# Patient Record
Sex: FEMALE | Race: WHITE | Employment: FULL TIME | URBAN - METROPOLITAN AREA
[De-identification: names, ages, dates, MRNs, and addresses within clinical notes are randomized per-mention and may not be internally consistent; named-entity substitution may affect disease eponyms.]

---

## 2017-03-20 PROBLEM — J01.00 ACUTE NON-RECURRENT MAXILLARY SINUSITIS: Status: ACTIVE | Noted: 2017-03-20

## 2017-03-23 PROBLEM — Z98.891 PREVIOUS CESAREAN SECTION: Status: ACTIVE | Noted: 2017-03-23

## 2017-06-09 PROBLEM — E03.9 ACQUIRED HYPOTHYROIDISM: Status: ACTIVE | Noted: 2017-06-09

## 2017-06-09 PROBLEM — Z00.01 ENCOUNTER FOR GENERAL ADULT MEDICAL EXAMINATION WITH ABNORMAL FINDINGS: Status: ACTIVE | Noted: 2017-06-09

## 2017-06-09 PROBLEM — E55.9 VITAMIN D DEFICIENCY: Status: ACTIVE | Noted: 2017-06-09

## 2017-06-09 PROBLEM — H92.01 RIGHT EAR PAIN: Status: ACTIVE | Noted: 2017-06-09

## 2017-06-09 PROBLEM — R53.83 FATIGUE: Status: ACTIVE | Noted: 2017-06-09

## 2017-07-21 PROBLEM — G58.8 INTERCOSTAL NEURALGIA: Status: ACTIVE | Noted: 2017-07-21

## 2017-12-08 PROBLEM — R10.13 EPIGASTRIC PAIN: Status: ACTIVE | Noted: 2017-12-08

## 2017-12-08 PROBLEM — K21.9 GASTROESOPHAGEAL REFLUX DISEASE WITHOUT ESOPHAGITIS: Status: ACTIVE | Noted: 2017-12-08

## 2018-03-14 ENCOUNTER — HOSPITAL ENCOUNTER (OUTPATIENT)
Dept: GENERAL RADIOLOGY | Age: 28
Discharge: HOME | End: 2018-03-16
Payer: COMMERCIAL

## 2018-03-14 DIAGNOSIS — N63.10 LUMP OF BREAST, RIGHT: ICD-10-CM

## 2018-03-14 DIAGNOSIS — D24.2 FIBROADENOMA OF LEFT BREAST: ICD-10-CM

## 2018-03-14 PROCEDURE — 76642 ULTRASOUND BREAST LIMITED: CPT

## 2018-03-15 ENCOUNTER — HOSPITAL ENCOUNTER (OUTPATIENT)
Dept: ULTRASOUND IMAGING | Age: 28
Discharge: HOME | End: 2018-03-17
Payer: COMMERCIAL

## 2018-03-15 DIAGNOSIS — R10.2 PELVIC PAIN IN FEMALE: ICD-10-CM

## 2018-03-15 PROCEDURE — 76856 US EXAM PELVIC COMPLETE: CPT

## 2018-03-15 RX ORDER — LEVOTHYROXINE SODIUM 0.1 MG/1
100 TABLET ORAL DAILY
Qty: 90 TABLET | Refills: 1 | Status: SHIPPED | OUTPATIENT
Start: 2018-03-15 | End: 2018-09-12 | Stop reason: SDUPTHER

## 2018-05-07 ENCOUNTER — OFFICE VISIT (OUTPATIENT)
Dept: FAMILY MEDICINE CLINIC | Age: 28
End: 2018-05-07
Payer: COMMERCIAL

## 2018-05-07 ENCOUNTER — HOSPITAL ENCOUNTER (OUTPATIENT)
Age: 28
Discharge: HOME | End: 2018-05-09
Payer: COMMERCIAL

## 2018-05-07 VITALS
RESPIRATION RATE: 20 BRPM | OXYGEN SATURATION: 96 % | BODY MASS INDEX: 29.41 KG/M2 | DIASTOLIC BLOOD PRESSURE: 64 MMHG | HEIGHT: 63 IN | WEIGHT: 166 LBS | HEART RATE: 87 BPM | SYSTOLIC BLOOD PRESSURE: 116 MMHG

## 2018-05-07 DIAGNOSIS — K21.9 GASTROESOPHAGEAL REFLUX DISEASE WITHOUT ESOPHAGITIS: ICD-10-CM

## 2018-05-07 DIAGNOSIS — Z00.01 ENCOUNTER FOR GENERAL ADULT MEDICAL EXAMINATION WITH ABNORMAL FINDINGS: ICD-10-CM

## 2018-05-07 DIAGNOSIS — R10.11 RUQ PAIN: ICD-10-CM

## 2018-05-07 DIAGNOSIS — Z00.01 ENCOUNTER FOR GENERAL ADULT MEDICAL EXAMINATION WITH ABNORMAL FINDINGS: Primary | ICD-10-CM

## 2018-05-07 LAB
ALBUMIN SERPL-MCNC: 4.5 G/DL (ref 3.5–5.2)
ALP BLD-CCNC: 66 U/L (ref 35–104)
ALT SERPL-CCNC: 20 U/L (ref 0–32)
ANION GAP SERPL CALCULATED.3IONS-SCNC: 14 MMOL/L (ref 7–16)
AST SERPL-CCNC: 22 U/L (ref 0–31)
BILIRUB SERPL-MCNC: 0.3 MG/DL (ref 0–1.2)
BUN BLDV-MCNC: 11 MG/DL (ref 6–20)
CALCIUM SERPL-MCNC: 8.9 MG/DL (ref 8.6–10.2)
CHLORIDE BLD-SCNC: 102 MMOL/L (ref 98–107)
CHOLESTEROL, TOTAL: 189 MG/DL (ref 0–199)
CO2: 24 MMOL/L (ref 22–29)
CREAT SERPL-MCNC: 0.5 MG/DL (ref 0.5–1)
GFR AFRICAN AMERICAN: >60
GFR NON-AFRICAN AMERICAN: >60 ML/MIN/1.73
GLUCOSE BLD-MCNC: 79 MG/DL (ref 74–109)
HCT VFR BLD CALC: 41.3 % (ref 34–48)
HDLC SERPL-MCNC: 48 MG/DL
HEMOGLOBIN: 12.9 G/DL (ref 11.5–15.5)
LDL CHOLESTEROL CALCULATED: 125 MG/DL (ref 0–99)
MCH RBC QN AUTO: 27.9 PG (ref 26–35)
MCHC RBC AUTO-ENTMCNC: 31.2 % (ref 32–34.5)
MCV RBC AUTO: 89.4 FL (ref 80–99.9)
PDW BLD-RTO: 12.4 FL (ref 11.5–15)
PLATELET # BLD: 221 E9/L (ref 130–450)
PMV BLD AUTO: 11.7 FL (ref 7–12)
POTASSIUM SERPL-SCNC: 4 MMOL/L (ref 3.5–5)
RBC # BLD: 4.62 E12/L (ref 3.5–5.5)
SODIUM BLD-SCNC: 140 MMOL/L (ref 132–146)
TOTAL PROTEIN: 7.7 G/DL (ref 6.4–8.3)
TRIGL SERPL-MCNC: 82 MG/DL (ref 0–149)
TSH SERPL DL<=0.05 MIU/L-ACNC: 0.52 UIU/ML (ref 0.27–4.2)
VLDLC SERPL CALC-MCNC: 16 MG/DL
WBC # BLD: 6.6 E9/L (ref 4.5–11.5)

## 2018-05-07 PROCEDURE — 99395 PREV VISIT EST AGE 18-39: CPT | Performed by: FAMILY MEDICINE

## 2018-05-07 PROCEDURE — 36415 COLL VENOUS BLD VENIPUNCTURE: CPT | Performed by: FAMILY MEDICINE

## 2018-05-07 PROCEDURE — 84443 ASSAY THYROID STIM HORMONE: CPT

## 2018-05-07 PROCEDURE — 85027 COMPLETE CBC AUTOMATED: CPT

## 2018-05-07 PROCEDURE — 80061 LIPID PANEL: CPT

## 2018-05-07 PROCEDURE — 80053 COMPREHEN METABOLIC PANEL: CPT

## 2018-05-07 ASSESSMENT — ENCOUNTER SYMPTOMS
COLOR CHANGE: 0
APNEA: 0
CONSTIPATION: 0
WHEEZING: 0
CHEST TIGHTNESS: 0
BLOOD IN STOOL: 0
SORE THROAT: 0
COUGH: 0
RHINORRHEA: 0
NAUSEA: 1
SINUS PRESSURE: 0
BACK PAIN: 0
EYE ITCHING: 0
SHORTNESS OF BREATH: 0
EYE PAIN: 0
BELCHING: 1
DIARRHEA: 0
VOMITING: 0
EYE REDNESS: 0
ABDOMINAL PAIN: 1

## 2018-05-07 ASSESSMENT — PATIENT HEALTH QUESTIONNAIRE - PHQ9
1. LITTLE INTEREST OR PLEASURE IN DOING THINGS: 0
SUM OF ALL RESPONSES TO PHQ QUESTIONS 1-9: 0
SUM OF ALL RESPONSES TO PHQ9 QUESTIONS 1 & 2: 0
2. FEELING DOWN, DEPRESSED OR HOPELESS: 0

## 2018-05-25 ENCOUNTER — HOSPITAL ENCOUNTER (OUTPATIENT)
Dept: ULTRASOUND IMAGING | Age: 28
Discharge: HOME | End: 2018-05-27
Payer: COMMERCIAL

## 2018-05-25 DIAGNOSIS — R10.11 RUQ PAIN: ICD-10-CM

## 2018-05-25 PROCEDURE — 76705 ECHO EXAM OF ABDOMEN: CPT

## 2018-07-30 ENCOUNTER — HOSPITAL ENCOUNTER (OUTPATIENT)
Dept: GENERAL RADIOLOGY | Age: 28
Discharge: HOME | End: 2018-08-01
Payer: COMMERCIAL

## 2018-07-30 DIAGNOSIS — D24.2 BREAST FIBROADENOMA, LEFT: ICD-10-CM

## 2018-09-10 ENCOUNTER — TELEPHONE (OUTPATIENT)
Dept: BREAST CENTER | Age: 28
End: 2018-09-10

## 2018-09-12 RX ORDER — LEVOTHYROXINE SODIUM 100 MCG
TABLET ORAL
Qty: 90 TABLET | Refills: 3 | Status: SHIPPED | OUTPATIENT
Start: 2018-09-12 | End: 2018-12-17 | Stop reason: SDUPTHER

## 2018-09-17 ENCOUNTER — TELEPHONE (OUTPATIENT)
Dept: ADMINISTRATIVE | Age: 28
End: 2018-09-17

## 2018-09-17 ENCOUNTER — OFFICE VISIT (OUTPATIENT)
Dept: FAMILY MEDICINE CLINIC | Age: 28
End: 2018-09-17
Payer: COMMERCIAL

## 2018-09-17 VITALS
BODY MASS INDEX: 29.59 KG/M2 | OXYGEN SATURATION: 98 % | SYSTOLIC BLOOD PRESSURE: 100 MMHG | HEART RATE: 79 BPM | RESPIRATION RATE: 18 BRPM | HEIGHT: 63 IN | WEIGHT: 167 LBS | TEMPERATURE: 98.2 F | DIASTOLIC BLOOD PRESSURE: 70 MMHG

## 2018-09-17 DIAGNOSIS — J06.9 VIRAL URI: ICD-10-CM

## 2018-09-17 DIAGNOSIS — R42 VERTIGO: ICD-10-CM

## 2018-09-17 PROCEDURE — 99213 OFFICE O/P EST LOW 20 MIN: CPT | Performed by: FAMILY MEDICINE

## 2018-09-17 RX ORDER — MECLIZINE HCL 12.5 MG/1
12.5 TABLET ORAL 3 TIMES DAILY PRN
Qty: 30 TABLET | Refills: 0 | Status: SHIPPED | OUTPATIENT
Start: 2018-09-17 | End: 2018-09-27

## 2018-09-17 ASSESSMENT — PATIENT HEALTH QUESTIONNAIRE - PHQ9
2. FEELING DOWN, DEPRESSED OR HOPELESS: 0
1. LITTLE INTEREST OR PLEASURE IN DOING THINGS: 0
SUM OF ALL RESPONSES TO PHQ9 QUESTIONS 1 & 2: 0
SUM OF ALL RESPONSES TO PHQ QUESTIONS 1-9: 0
SUM OF ALL RESPONSES TO PHQ QUESTIONS 1-9: 0

## 2018-09-17 ASSESSMENT — ENCOUNTER SYMPTOMS
SHORTNESS OF BREATH: 0
SORE THROAT: 0
BLOOD IN STOOL: 0
CHEST TIGHTNESS: 0
COLOR CHANGE: 0
COUGH: 0
SWOLLEN GLANDS: 0
VOMITING: 0
SINUS PRESSURE: 0
EYE REDNESS: 0
WHEEZING: 0
NAUSEA: 0
EYE PAIN: 0
RHINORRHEA: 0
APNEA: 0
DIARRHEA: 0
CONSTIPATION: 0
ABDOMINAL PAIN: 0
BACK PAIN: 0
EYE ITCHING: 0

## 2018-09-17 NOTE — TELEPHONE ENCOUNTER
Scheduled pt for appt fri 9/21 for ringing in ears, then she stated that she is dizzy, and transferred call to the office for further assistance

## 2018-09-17 NOTE — PROGRESS NOTES
MOUTH ONE TIME A DAY 90 tablet 3    fluticasone (FLONASE) 50 MCG/ACT nasal spray 2 sprays by Nasal route daily 1 Bottle 1    albuterol sulfate HFA (PROAIR HFA) 108 (90 BASE) MCG/ACT inhaler Inhale 2 puffs into the lungs every 6 hours as needed for Wheezing 1 Inhaler 3     No current facility-administered medications for this visit. Allergies   Allergen Reactions    Cefdinir Hives     Had reaction as a child    Lomedia 24 Fe [Norethin Ace-Eth Estrad-Fe] Hives       Social History     Social History    Marital status:      Spouse name: N/A    Number of children: N/A    Years of education: N/A     Occupational History     [employer]     Social History Main Topics    Smoking status: Never Smoker    Smokeless tobacco: Never Used    Alcohol use No      Comment: wine once in a while    Drug use: No    Sexual activity: Yes     Other Topics Concern    None     Social History Narrative    None       Family History   Problem Relation Age of Onset    Hypertension Mother         pulmonary hypertension     High Blood Pressure Mother     Other Mother     Heart Disease Father     Kidney Disease Father     Hypertension Father         IBS & polyps    Other Sister         gastric ulcer & IBS          Review of Systems   Constitutional: Negative for activity change, appetite change, diaphoresis, fatigue and fever. HENT: Positive for congestion. Negative for ear pain, hearing loss, nosebleeds, rhinorrhea, sinus pressure and sore throat. Eyes: Negative for pain, redness, itching and visual disturbance. Respiratory: Negative for apnea, cough, chest tightness, shortness of breath and wheezing. Cardiovascular: Negative for chest pain, palpitations and leg swelling. Gastrointestinal: Negative for abdominal pain, blood in stool, constipation, diarrhea, nausea and vomiting. Endocrine: Negative.     Genitourinary: Negative for decreased urine volume, difficulty urinating, dysuria, frequency, hematuria and urgency. Musculoskeletal: Negative for arthralgias, back pain, gait problem, myalgias and neck pain. Skin: Negative for color change and rash. Allergic/Immunologic: Negative for environmental allergies and food allergies. Neurological: Positive for dizziness and vertigo. Negative for weakness, light-headedness, numbness and headaches. Hematological: Negative for adenopathy. Does not bruise/bleed easily. Psychiatric/Behavioral: Negative for behavioral problems, dysphoric mood and sleep disturbance. The patient is not nervous/anxious and is not hyperactive. All other systems reviewed and are negative. /70   Pulse 79   Temp 98.2 °F (36.8 °C)   Resp 18   Ht 5' 3\" (1.6 m)   Wt 167 lb (75.8 kg)   SpO2 98%   BMI 29.58 kg/m²     Physical Exam   Constitutional: She is oriented to person, place, and time. She appears well-developed and well-nourished. HENT:   Head: Normocephalic and atraumatic. Right Ear: External ear normal.   Left Ear: External ear normal.   Nose: Nose normal.   Mouth/Throat: Oropharynx is clear and moist.   Eyes: Pupils are equal, round, and reactive to light. Conjunctivae and EOM are normal. No scleral icterus. Neck: Normal range of motion. Neck supple. No thyromegaly present. Cardiovascular: Normal rate, regular rhythm and normal heart sounds. No murmur heard. Pulmonary/Chest: Effort normal and breath sounds normal. No respiratory distress. She has no wheezes. She has no rales. Abdominal: Soft. Bowel sounds are normal. She exhibits no distension. There is no tenderness. Musculoskeletal: Normal range of motion. She exhibits no edema or tenderness. Lymphadenopathy:     She has no cervical adenopathy. Neurological: She is alert and oriented to person, place, and time. She has normal reflexes. No cranial nerve deficit. Skin: Skin is warm and dry. No rash noted. No erythema.    Psychiatric: She has a normal mood and

## 2018-09-20 ENCOUNTER — HOSPITAL ENCOUNTER (EMERGENCY)
Dept: HOSPITAL 83 - ED | Age: 28
Discharge: HOME | End: 2018-09-20
Payer: COMMERCIAL

## 2018-09-20 VITALS — BODY MASS INDEX: 29.59 KG/M2 | HEIGHT: 62.99 IN | WEIGHT: 167 LBS

## 2018-09-20 DIAGNOSIS — Z79.899: ICD-10-CM

## 2018-09-20 DIAGNOSIS — F41.9: ICD-10-CM

## 2018-09-20 DIAGNOSIS — R42: Primary | ICD-10-CM

## 2018-09-20 DIAGNOSIS — Z88.8: ICD-10-CM

## 2018-09-20 LAB
AMPHETAMINES UR QL SCN: < 1000
BARBITURATES UR QL SCN: < 200
BENZODIAZ UR QL SCN: < 200
BZE UR QL SCN: < 300
CANNABINOIDS UR QL SCN: < 50
METHADONE UR QL SCN: < 300
OPIATES UR QL SCN: < 300
PCP UR QL SCN: <  25

## 2018-09-21 DIAGNOSIS — N63.20 MASSES OF BOTH BREASTS: Primary | ICD-10-CM

## 2018-09-21 DIAGNOSIS — N63.10 MASSES OF BOTH BREASTS: Primary | ICD-10-CM

## 2018-09-21 ASSESSMENT — ENCOUNTER SYMPTOMS
EYE ITCHING: 0
RHINORRHEA: 0
CHEST TIGHTNESS: 0
SINUS PRESSURE: 0
CHOKING: 0
WHEEZING: 0
VOICE CHANGE: 0
DIARRHEA: 0
TROUBLE SWALLOWING: 0
COUGH: 0
VOMITING: 0
BLOOD IN STOOL: 0
BACK PAIN: 0
SHORTNESS OF BREATH: 0
SORE THROAT: 0
SINUS PAIN: 0
ABDOMINAL DISTENTION: 0
EYE DISCHARGE: 0
CONSTIPATION: 0
ABDOMINAL PAIN: 0
NAUSEA: 0

## 2018-09-21 NOTE — PROGRESS NOTES
performed in the left breast using a radial and anti-radial approach. There is an oval solid mass with partially defined margins measuring 13 x 6 x 8 mm seen in the left breast at 8 o'clock located 3 centimeters from the nipple. Internal    echogenicity is hypoechoic.       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       It contains a clip which correlates with history of prior biopsy.       No sonographic evidence of suspicious solid or cystic mass lesions.  No focal areas of suspicious atypical echogenicity.       IMPRESSION:   Solid mass in the left breast is benign.       Screening mammogram at age 38 is recommended.       =======================================   BI-RADS Category 2:  Benign   =======================================      Right Ultrasound       HISTORY:   The patient has no personal history of cancer. The patient has the following family history of breast cancer:  paternal aunt, at age 48. 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       ULTRASOUND TECHNIQUE:   High-resolution real-time ultrasound scanning was performed. Additional elastography and doppler color flow analysis of any finding was also obtained.       COMPARISON:   No prior imaging studies are available for comparison.       ULTRASOUND FINDINGS:           Finding 1:   Sonography was performed in the right breast using a radial and anti-radial approach. No sonographic evidence of suspicious solid or cystic mass lesions. No focal areas of suspicious atypical echogenicity.       IMPRESSION:   There is no sonographic evidence of malignancy.       Screening mammogram at age 38 is recommended.       =======================================   BI-RADS Category 1:  Negative     Review of Systems   Constitutional: Negative for activity change, appetite change, chills, fatigue, fever and unexpected weight change.    HENT: Negative for congestion, postnasal drip, rhinorrhea, sinus pain, sinus pressure, sore throat, trouble swallowing and voice change. Eyes: Negative for discharge, itching and visual disturbance. Respiratory: Negative for cough, choking, chest tightness, shortness of breath and wheezing. Cardiovascular: Negative for chest pain, palpitations and leg swelling. Gastrointestinal: Negative for abdominal distention, abdominal pain, blood in stool, constipation, diarrhea, nausea and vomiting. Endocrine: Negative for cold intolerance and heat intolerance. Genitourinary: Negative for difficulty urinating, dysuria, frequency and hematuria. Musculoskeletal: Negative for arthralgias, back pain, gait problem, joint swelling, myalgias, neck pain and neck stiffness. Allergic/Immunologic: Negative for environmental allergies and food allergies. Neurological: Negative for dizziness, seizures, syncope, speech difficulty, weakness, light-headedness and headaches. Hematological: Negative for adenopathy. Does not bruise/bleed easily. Psychiatric/Behavioral: Negative for agitation, confusion and decreased concentration. The patient is not nervous/anxious. Objective:   Physical Exam   Constitutional: She is oriented to person, place, and time. She appears well-developed and well-nourished. No distress. HENT:   Head: Normocephalic and atraumatic. Mouth/Throat: Oropharynx is clear and moist. No oropharyngeal exudate. Eyes: Conjunctivae and EOM are normal. Right eye exhibits no discharge. Left eye exhibits no discharge. No scleral icterus. Neck: Normal range of motion. Neck supple. No JVD present. No tracheal deviation present. No thyromegaly present. Cardiovascular: Normal rate and regular rhythm. Exam reveals no gallop and no friction rub. No murmur heard. Pulmonary/Chest: Effort normal and breath sounds normal. No stridor. No respiratory distress. She has no wheezes. She has no rales.  She exhibits no mass, no tenderness, no bony tenderness, no laceration, no

## 2018-09-24 ENCOUNTER — HOSPITAL ENCOUNTER (OUTPATIENT)
Dept: GENERAL RADIOLOGY | Age: 28
Discharge: HOME | End: 2018-09-26
Payer: COMMERCIAL

## 2018-09-24 ENCOUNTER — OFFICE VISIT (OUTPATIENT)
Dept: BREAST CENTER | Age: 28
End: 2018-09-24
Payer: COMMERCIAL

## 2018-09-24 VITALS
SYSTOLIC BLOOD PRESSURE: 102 MMHG | OXYGEN SATURATION: 98 % | RESPIRATION RATE: 16 BRPM | HEART RATE: 76 BPM | BODY MASS INDEX: 29.59 KG/M2 | DIASTOLIC BLOOD PRESSURE: 68 MMHG | TEMPERATURE: 97.9 F | HEIGHT: 63 IN | WEIGHT: 167 LBS

## 2018-09-24 DIAGNOSIS — N63.10 MASSES OF BOTH BREASTS: ICD-10-CM

## 2018-09-24 DIAGNOSIS — N63.20 LEFT BREAST MASS: ICD-10-CM

## 2018-09-24 DIAGNOSIS — N63.20 MASSES OF BOTH BREASTS: ICD-10-CM

## 2018-09-24 PROCEDURE — 99204 OFFICE O/P NEW MOD 45 MIN: CPT | Performed by: NURSE PRACTITIONER

## 2018-09-24 PROCEDURE — 99203 OFFICE O/P NEW LOW 30 MIN: CPT | Performed by: NURSE PRACTITIONER

## 2018-09-24 PROCEDURE — 76642 ULTRASOUND BREAST LIMITED: CPT

## 2018-09-24 ASSESSMENT — ENCOUNTER SYMPTOMS
TROUBLE SWALLOWING: 0
WHEEZING: 0
COUGH: 0
EYE DISCHARGE: 0
VOICE CHANGE: 0
VOMITING: 0
ABDOMINAL PAIN: 0
SINUS PRESSURE: 0
CONSTIPATION: 0
SHORTNESS OF BREATH: 0
EYE ITCHING: 0
SINUS PAIN: 0
ABDOMINAL DISTENTION: 0
BACK PAIN: 0
SORE THROAT: 0
BLOOD IN STOOL: 0
CHOKING: 0
RHINORRHEA: 0
DIARRHEA: 0
NAUSEA: 0
CHEST TIGHTNESS: 0

## 2018-09-24 NOTE — PROGRESS NOTES
x 8 mm seen in the left breast at 8 o'clock located 3 centimeters from the nipple. Internal    echogenicity is hypoechoic.       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       It contains a clip which correlates with history of prior biopsy.       No sonographic evidence of suspicious solid or cystic mass lesions.  No focal areas of suspicious atypical echogenicity.       IMPRESSION:   Solid mass in the left breast is benign.       Screening mammogram at age 38 is recommended.       =======================================   BI-RADS Category 2:  Benign   =======================================      Right Ultrasound       HISTORY:   The patient has no personal history of cancer. The patient has the following family history of breast cancer:  paternal aunt, at age 48. 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       ULTRASOUND TECHNIQUE:   High-resolution real-time ultrasound scanning was performed. Additional elastography and doppler color flow analysis of any finding was also obtained.       COMPARISON:   No prior imaging studies are available for comparison.       ULTRASOUND FINDINGS:           Finding 1:   Sonography was performed in the right breast using a radial and anti-radial approach. No sonographic evidence of suspicious solid or cystic mass lesions.  No focal areas of suspicious atypical echogenicity.       IMPRESSION:   There is no sonographic evidence of malignancy.       Screening mammogram at age 38 is recommended.       =======================================   BI-RADS Category 1:  Negative         Past Medical History:   Diagnosis Date    Abdominal cramping     Asthma     excersise induced and allergy induced    Chest pain 6/6/14    Reason for referral    Diarrhea     interchanging with constipation    Dizziness 6/6/14    during pregnancy    Environmental allergies     Mitral valve prolapse     no regurgitation    Palpitations 6/6/14 caffeine induced    Thyroid disease      Past Surgical History:   Procedure Laterality Date     SECTION      2 children     COLONOSCOPY  16   [facility]       Social History     Social History    Marital status:      Spouse name: N/A    Number of children: N/A    Years of education: N/A     Occupational History     [employer]     Social History Main Topics    Smoking status: Never Smoker    Smokeless tobacco: Never Used    Alcohol use No      Comment: wine once in a while    Drug use: No    Sexual activity: Yes     Other Topics Concern    Not on file     Social History Narrative    No narrative on file     Allergies   Allergen Reactions    Cefdinir Hives     Had reaction as a child    Lomedia 24 Fe [Norethin Ace-Eth Estrad-Fe] Hives     Current Outpatient Prescriptions on File Prior to Visit   Medication Sig Dispense Refill    meclizine (ANTIVERT) 12.5 MG tablet Take 1 tablet by mouth 3 times daily as needed for Dizziness 30 tablet 0    SYNTHROID 100 MCG tablet TAKE ONE TABLET BY MOUTH ONE TIME A DAY 90 tablet 3    fluticasone (FLONASE) 50 MCG/ACT nasal spray 2 sprays by Nasal route daily 1 Bottle 1    albuterol sulfate HFA (PROAIR HFA) 108 (90 BASE) MCG/ACT inhaler Inhale 2 puffs into the lungs every 6 hours as needed for Wheezing 1 Inhaler 3     No current facility-administered medications on file prior to visit. Review of Systems   Constitutional: Negative for activity change, appetite change, chills, fatigue, fever and unexpected weight change. HENT: Negative for congestion, postnasal drip, rhinorrhea, sinus pain, sinus pressure, sore throat, trouble swallowing and voice change. Eyes: Negative for discharge, itching and visual disturbance. Respiratory: Negative for cough, choking, chest tightness, shortness of breath and wheezing. Cardiovascular: Negative for chest pain, palpitations and leg swelling.    Gastrointestinal: appropriate density bilaterally. No skin dimpling or puckering. No nipple discharge. No lumps nodules or masses appreciated on right. No axillary lymphadenopathy. Abdominal: Soft. She exhibits no distension. There is no tenderness. There is no rebound and no guarding. Musculoskeletal: Normal range of motion. She exhibits no edema, tenderness or deformity. Right shoulder: Normal.        Left shoulder: Normal.   Lymphadenopathy:     She has no cervical adenopathy. Right cervical: No superficial cervical, no deep cervical and no posterior cervical adenopathy present. Left cervical: No superficial cervical, no deep cervical and no posterior cervical adenopathy present. Right axillary: No pectoral and no lateral adenopathy present. Left axillary: No pectoral and no lateral adenopathy present. Neurological: She is alert and oriented to person, place, and time. Coordination normal.   Skin: Skin is warm and dry. No rash noted. She is not diaphoretic. No erythema. No pallor. Psychiatric: She has a normal mood and affect. Her behavior is normal. Judgment and thought content normal.   Nursing note and vitals reviewed. Assessment:      Popeye Liriano is an extremely pleasant 34 y.o. female with FH of breast cancer in paternal aunt in her early 52's (currently late 52's). She has a history of US core biopsy proven fibroadenoma done at [facility] in 2011. She presents today with C/O of increasing discomfort within the left breast.         Bilateral breast US 03/14/2018 @ [address] [place] [address] Dr García Decker Ultrasound       HISTORY:   The patient has no personal history of cancer. The patient has the following family history of breast cancer:  paternal aunt, at age 48. 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       ULTRASOUND TECHNIQUE:   High-resolution real-time ultrasound scanning was performed.  Additional elastography and doppler color flow analysis of any finding was also obtained.       COMPARISON:   No prior imaging studies are available for comparison.       ULTRASOUND FINDINGS:           Finding 1:   Sonography was performed in the left breast using a radial and anti-radial approach. There is an oval solid mass with partially defined margins measuring 13 x 6 x 8 mm seen in the left breast at 8 o'clock located 3 centimeters from the nipple. Internal    echogenicity is hypoechoic.       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       It contains a clip which correlates with history of prior biopsy.       No sonographic evidence of suspicious solid or cystic mass lesions.  No focal areas of suspicious atypical echogenicity.       IMPRESSION:   Solid mass in the left breast is benign.       Screening mammogram at age 38 is recommended.       =======================================   BI-RADS Category 2:  Benign   =======================================      Right Ultrasound       HISTORY:   The patient has no personal history of cancer. The patient has the following family history of breast cancer:  paternal aunt, at age 48. 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       ULTRASOUND TECHNIQUE:   High-resolution real-time ultrasound scanning was performed. Additional elastography and doppler color flow analysis of any finding was also obtained.       COMPARISON:   No prior imaging studies are available for comparison.       ULTRASOUND FINDINGS:           Finding 1:   Sonography was performed in the right breast using a radial and anti-radial approach. No sonographic evidence of suspicious solid or cystic mass lesions.  No focal areas of suspicious atypical echogenicity.       IMPRESSION:   There is no sonographic evidence of malignancy.       Screening mammogram at age 38 is recommended.       =======================================   BI-RADS Category 1:  Negative      A Bilateral breast ultrasound tolerated. 3. Avoid alcohol or limit alcohol intake to < 3 drinks per week. 4. Limit caffeine intake. 5. Wear a good supportive bra at all times when awake. 6. Mammogram beginning at age 38.  9. Continue follow up with Primary Care and GYN. 8. Obtain pathology report from [facility].  9. RTC if symptoms worsen or if new symptoms develop. During today's visit, face-to-face time 45 minutes, greater than 50% in counseling education and coordination of care. All questions were answered to her apparent satisfaction, and she is agreeable to the plan as outlined above. Vickey Velazquez, RN, MSN, ACNP-BC, AOCNP  Advanced Oncology Certified Nurse Practitioner  Department of Breast Surgery  [facility] [place]/  [facility] in collaboration with Dr. Don Abbott.  Hanna Sawant, APRN - CNP

## 2018-09-25 ENCOUNTER — TELEPHONE (OUTPATIENT)
Dept: BREAST CENTER | Age: 28
End: 2018-09-25

## 2018-09-26 PROBLEM — Z00.01 ENCOUNTER FOR GENERAL ADULT MEDICAL EXAMINATION WITH ABNORMAL FINDINGS: Status: RESOLVED | Noted: 2017-06-09 | Resolved: 2018-09-26

## 2018-10-16 ENCOUNTER — OFFICE VISIT (OUTPATIENT)
Dept: BREAST CENTER | Age: 28
End: 2018-10-16
Payer: COMMERCIAL

## 2018-10-16 VITALS
WEIGHT: 168 LBS | HEIGHT: 63 IN | RESPIRATION RATE: 16 BRPM | DIASTOLIC BLOOD PRESSURE: 72 MMHG | TEMPERATURE: 98.4 F | BODY MASS INDEX: 29.77 KG/M2 | SYSTOLIC BLOOD PRESSURE: 116 MMHG | OXYGEN SATURATION: 99 % | HEART RATE: 80 BPM

## 2018-10-16 DIAGNOSIS — N63.0 BREAST MASS: ICD-10-CM

## 2018-10-16 PROBLEM — R10.11 RUQ PAIN: Status: RESOLVED | Noted: 2018-05-07 | Resolved: 2018-10-16

## 2018-10-16 PROBLEM — R10.13 EPIGASTRIC PAIN: Status: RESOLVED | Noted: 2017-12-08 | Resolved: 2018-10-16

## 2018-10-16 PROBLEM — J06.9 VIRAL URI: Status: RESOLVED | Noted: 2018-09-17 | Resolved: 2018-10-16

## 2018-10-16 PROBLEM — Z98.891 PREVIOUS CESAREAN SECTION: Status: RESOLVED | Noted: 2017-03-23 | Resolved: 2018-10-16

## 2018-10-16 PROCEDURE — 99213 OFFICE O/P EST LOW 20 MIN: CPT | Performed by: SURGERY

## 2018-10-16 ASSESSMENT — ENCOUNTER SYMPTOMS
CHOKING: 0
DIARRHEA: 0
CONSTIPATION: 0
RHINORRHEA: 0
ABDOMINAL DISTENTION: 0
EYE DISCHARGE: 0
BLOOD IN STOOL: 0
VOICE CHANGE: 0
WHEEZING: 0
SINUS PAIN: 0
COUGH: 0
TROUBLE SWALLOWING: 0
NAUSEA: 0
BACK PAIN: 0
SORE THROAT: 0
SHORTNESS OF BREATH: 0
EYE ITCHING: 0
ABDOMINAL PAIN: 0
VOMITING: 0
CHEST TIGHTNESS: 0
SINUS PRESSURE: 0

## 2018-10-16 NOTE — PROGRESS NOTES
echogenicity is hypoechoic.       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       No sonographic evidence of suspicious solid or cystic mass lesions.  No focal areas of suspicious atypical echogenicity.       IMPRESSION:   Stable solid mass in the left breast is benign.       Screening mammogram at age 38 is recommended.       =======================================   BI-RADS Category 2:  Benign       18, Right breast ultrasound, [facility]:     ULTRASOUND FINDINGS:   US with color only.           Finding 1:   Sonography was performed in the right breast using a radial and anti-radial approach. No sonographic evidence of suspicious solid or cystic mass lesions.  No focal areas of suspicious atypical echogenicity.       IMPRESSION:   There is no sonographic evidence of malignancy.       Screening mammogram at age 38 is recommended.       =======================================   BI-RADS Category 1:  Negative             Past Medical History:   Diagnosis Date    Abdominal cramping     Asthma     excersise induced and allergy induced    Chest pain 16    Reason for referral    Diarrhea     interchanging with constipation    Dizziness 16    during pregnancy    Environmental allergies     Mitral valve prolapse     no regurgitation    Palpitations 16    caffeine induced    Thyroid disease      Past Surgical History:   Procedure Laterality Date     SECTION      2 children     COLONOSCOPY  16   Leeann Ryan DENTAL SURGERY       Social History     Social History    Marital status:      Spouse name: N/A    Number of children: N/A    Years of education: N/A     Occupational History     [employer]     Social History Main Topics    Smoking status: Never Smoker    Smokeless tobacco: Never Used    Alcohol use Yes      Comment: wine once in a while    Drug use: No    Sexual activity: Yes     Other Topics Concern    Not on file     Social History Narrative nervous/anxious. Objective:   Physical Exam   Constitutional: She is oriented to person, place, and time. She appears well-developed and well-nourished. No distress. HENT:   Head: Normocephalic and atraumatic. Mouth/Throat: Oropharynx is clear and moist. No oropharyngeal exudate. Eyes: Conjunctivae and EOM are normal. Right eye exhibits no discharge. Left eye exhibits no discharge. No scleral icterus. Neck: Normal range of motion. Neck supple. No JVD present. No tracheal deviation present. No thyromegaly present. Cardiovascular: Normal rate and regular rhythm. Exam reveals no gallop and no friction rub. No murmur heard. Pulmonary/Chest: Effort normal and breath sounds normal. No stridor. No respiratory distress. She has no wheezes. She has no rales. She exhibits no mass, no tenderness, no bony tenderness, no laceration, no edema, no deformity, no swelling and no retraction. Right breast exhibits no inverted nipple, no mass, no nipple discharge, no skin change and no tenderness. Left breast exhibits mass (barely palpable). Left breast exhibits no inverted nipple, no nipple discharge, no skin change and no tenderness. Breasts are symmetrical.       Breasts are with age appropriate density bilaterally. No skin dimpling or puckering. No nipple discharge. No lumps nodules or masses appreciated on right. No axillary lymphadenopathy. Abdominal: Soft. She exhibits no distension. There is no tenderness. There is no rebound and no guarding. Musculoskeletal: Normal range of motion. She exhibits no edema, tenderness or deformity. Right shoulder: Normal.        Left shoulder: Normal.   Lymphadenopathy:     She has no cervical adenopathy. Right cervical: No superficial cervical, no deep cervical and no posterior cervical adenopathy present. Left cervical: No superficial cervical, no deep cervical and no posterior cervical adenopathy present.         Right axillary: No pectoral and

## 2018-10-16 NOTE — COMMUNICATION BODY
history of left ultrasound core biopsy at age 25 - fibroadenoma.       ULTRASOUND TECHNIQUE:   High-resolution real-time ultrasound scanning was performed. Additional elastography and doppler color flow analysis of any finding was also obtained.       COMPARISON:   No prior imaging studies are available for comparison.       ULTRASOUND FINDINGS:   Finding 1:   Sonography was performed in the right breast using a radial and anti-radial approach. No sonographic evidence of suspicious solid or cystic mass lesions. No focal areas of suspicious atypical echogenicity.       IMPRESSION:   There is no sonographic evidence of malignancy.       Screening mammogram at age 38 is recommended.       =======================================   BI-RADS Category 1:  Negative        9/24/18, Left breast ultrasound, [facility]:     ULTRASOUND FINDINGS:           Finding 1:   Sonography was performed in the left breast using a radial and anti-radial approach. There is a stable oval well circumscribed solid mass measuring 18 x 8 x 11 mm seen in the left breast at 8 o'clock located 5 centimeters from the nipple. Internal    echogenicity is hypoechoic.       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       No sonographic evidence of suspicious solid or cystic mass lesions.  No focal areas of suspicious atypical echogenicity.       IMPRESSION:   Stable solid mass in the left breast is benign.       Screening mammogram at age 38 is recommended.       =======================================   BI-RADS Category 2:  Benign       9/24/18, Right breast ultrasound, [facility]:     ULTRASOUND FINDINGS:   US with color only.           Finding 1:   Sonography was performed in the right breast using a radial and anti-radial approach. No sonographic evidence of suspicious solid or cystic mass lesions.  No focal areas of suspicious atypical echogenicity.       IMPRESSION:   There is no sonographic evidence of malignancy.       Screening mammogram at age 42 is recommended.       =======================================   BI-RADS Category 1:  Negative         Long discussion about options going forward. Mass has slightly increased in size, however patient has recently been pregnant X2. She is drinking 2-4 cups of coffee per day-suggested she minimize caffeine intake to decrease discomfort. Mass remains benign in appearance on US and less than 2 cm in size. Most appropriate to follow at this time, excise if dramatically increases in size, develop abnormal appearance on US or becomes increasingly symptomatic. Mass  Is currently  tolerable and she does not desire excision at this time. Plan:     1. Continue monthly breast self examination; detailed instructions reviewed today. Bring any changes to your physician's attention. 2. Continue healthy diet and exercise routinely as tolerated. 3. Avoid alcohol or limit alcohol intake to < 3 drinks per week. 4. Limit caffeine intake as possible. 5. Wear a good supportive bra at all times when awake. 6. Mammogram beginning at age 38.  9. Continue follow up with Primary Care and GYN. 8. RTC if symptoms worsen or if new symptoms develop. OV 6 months with US to assess size.

## 2018-10-16 NOTE — LETTER
No sonographic evidence of suspicious solid or cystic mass lesions.  No focal areas of suspicious atypical echogenicity.       IMPRESSION:   Solid mass in the left breast is benign.       Screening mammogram at age 38 is recommended.       =======================================   BI-RADS Category 2:  Benign   =======================================      Right Ultrasound   HISTORY:   The patient has no personal history of cancer. The patient has the following family history of breast cancer:  paternal aunt, at age 48. 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       ULTRASOUND TECHNIQUE:   High-resolution real-time ultrasound scanning was performed. Additional elastography and doppler color flow analysis of any finding was also obtained.       COMPARISON:   No prior imaging studies are available for comparison.       ULTRASOUND FINDINGS:   Finding 1:   Sonography was performed in the right breast using a radial and anti-radial approach. No sonographic evidence of suspicious solid or cystic mass lesions. No focal areas of suspicious atypical echogenicity.       IMPRESSION:   There is no sonographic evidence of malignancy.       Screening mammogram at age 38 is recommended.       =======================================   BI-RADS Category 1:  Negative        9/24/18, Left breast ultrasound, [facility]:     ULTRASOUND FINDINGS:           Finding 1:   Sonography was performed in the left breast using a radial and anti-radial approach. There is a stable oval well circumscribed solid mass measuring 18 x 8 x 11 mm seen in the left breast at 8 o'clock located 5 centimeters from the nipple.  Internal    echogenicity is hypoechoic.       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       No sonographic evidence of suspicious solid or cystic mass lesions.  No focal areas of suspicious atypical echogenicity.       IMPRESSION:   Stable solid mass in the left breast is benign.

## 2018-10-17 ENCOUNTER — TELEPHONE (OUTPATIENT)
Dept: CARDIOLOGY CLINIC | Age: 28
End: 2018-10-17

## 2018-10-17 DIAGNOSIS — N63.20 LEFT BREAST MASS: Primary | ICD-10-CM

## 2018-10-19 ENCOUNTER — HOSPITAL ENCOUNTER (OUTPATIENT)
Dept: MRI IMAGING | Age: 28
Discharge: HOME | End: 2018-10-21
Payer: COMMERCIAL

## 2018-10-19 DIAGNOSIS — R42 VERTIGO: ICD-10-CM

## 2018-10-19 DIAGNOSIS — G35 MS (MULTIPLE SCLEROSIS) (HCC): ICD-10-CM

## 2018-10-19 DIAGNOSIS — R42 DIZZINESS: ICD-10-CM

## 2018-10-19 PROCEDURE — 70553 MRI BRAIN STEM W/O & W/DYE: CPT

## 2018-10-19 PROCEDURE — 6360000004 HC RX CONTRAST MEDICATION: Performed by: RADIOLOGY

## 2018-10-19 PROCEDURE — A9579 GAD-BASE MR CONTRAST NOS,1ML: HCPCS | Performed by: RADIOLOGY

## 2018-10-19 RX ADMIN — GADOTERIDOL 15 ML: 279.3 INJECTION, SOLUTION INTRAVENOUS at 13:06

## 2018-10-25 ENCOUNTER — TELEPHONE (OUTPATIENT)
Dept: ENT CLINIC | Age: 28
End: 2018-10-25

## 2018-10-26 ENCOUNTER — TELEPHONE (OUTPATIENT)
Dept: ENT CLINIC | Age: 28
End: 2018-10-26

## 2018-10-26 NOTE — TELEPHONE ENCOUNTER
Patient called regarding she has seen Rona Lombardo in 2017 and would like to establish care with Dr Sadie Kang. Patient is currently scheduled for a tonsillectomy with Dr Panchito Arizmendi but wants to switch physicians. Patient said she has a growth on her L tonsil that she wants checked. Patient wants to schedule apt.

## 2018-10-30 NOTE — TELEPHONE ENCOUNTER
Dr Dexter Escobar recommending patient make apt for second opinion. Patient scheduled for 11/9/18. Dr Dexter Escobar would like patient to keep surgical dates with Dr Joana Cast. Patient notified. Patient would like Dr Dexter Escobar to check this flap in her throat next week. Patient can see PCP or go to ER if any throat issues prior to apt.

## 2018-10-31 ENCOUNTER — TELEPHONE (OUTPATIENT)
Dept: CARDIOLOGY CLINIC | Age: 28
End: 2018-10-31

## 2018-11-09 ENCOUNTER — OFFICE VISIT (OUTPATIENT)
Dept: ENT CLINIC | Age: 28
End: 2018-11-09
Payer: COMMERCIAL

## 2018-11-09 VITALS
HEIGHT: 63 IN | HEART RATE: 74 BPM | OXYGEN SATURATION: 97 % | BODY MASS INDEX: 29.23 KG/M2 | SYSTOLIC BLOOD PRESSURE: 137 MMHG | DIASTOLIC BLOOD PRESSURE: 83 MMHG | WEIGHT: 165 LBS

## 2018-11-09 DIAGNOSIS — J35.1 TONSILLAR HYPERTROPHY, UNILATERAL: ICD-10-CM

## 2018-11-09 DIAGNOSIS — J35.01 CHRONIC TONSILLITIS: Primary | ICD-10-CM

## 2018-11-09 PROCEDURE — 99213 OFFICE O/P EST LOW 20 MIN: CPT | Performed by: OTOLARYNGOLOGY

## 2018-11-09 ASSESSMENT — ENCOUNTER SYMPTOMS
GASTROINTESTINAL NEGATIVE: 1
EYES NEGATIVE: 1
COLOR CHANGE: 0
SHORTNESS OF BREATH: 0
SORE THROAT: 1
RESPIRATORY NEGATIVE: 1
ABDOMINAL PAIN: 0
TROUBLE SWALLOWING: 1

## 2018-11-12 ENCOUNTER — OFFICE VISIT (OUTPATIENT)
Dept: FAMILY MEDICINE CLINIC | Age: 28
End: 2018-11-12
Payer: COMMERCIAL

## 2018-11-12 ENCOUNTER — TELEPHONE (OUTPATIENT)
Dept: ENT CLINIC | Age: 28
End: 2018-11-12

## 2018-11-12 ENCOUNTER — HOSPITAL ENCOUNTER (OUTPATIENT)
Age: 28
Discharge: HOME | End: 2018-11-14
Payer: COMMERCIAL

## 2018-11-12 VITALS
DIASTOLIC BLOOD PRESSURE: 70 MMHG | RESPIRATION RATE: 18 BRPM | HEART RATE: 86 BPM | HEIGHT: 63 IN | SYSTOLIC BLOOD PRESSURE: 124 MMHG | OXYGEN SATURATION: 96 % | WEIGHT: 163 LBS | BODY MASS INDEX: 28.88 KG/M2

## 2018-11-12 DIAGNOSIS — R49.0 HOARSENESS: ICD-10-CM

## 2018-11-12 DIAGNOSIS — R53.83 FATIGUE, UNSPECIFIED TYPE: ICD-10-CM

## 2018-11-12 DIAGNOSIS — E03.9 ACQUIRED HYPOTHYROIDISM: ICD-10-CM

## 2018-11-12 DIAGNOSIS — R42 VERTIGO: ICD-10-CM

## 2018-11-12 DIAGNOSIS — K21.9 GASTROESOPHAGEAL REFLUX DISEASE WITHOUT ESOPHAGITIS: Primary | ICD-10-CM

## 2018-11-12 DIAGNOSIS — R10.30 LOWER ABDOMINAL PAIN: ICD-10-CM

## 2018-11-12 DIAGNOSIS — E04.9 THYROID GOITER: ICD-10-CM

## 2018-11-12 DIAGNOSIS — K62.5 RECTAL BLEED: ICD-10-CM

## 2018-11-12 PROCEDURE — 99214 OFFICE O/P EST MOD 30 MIN: CPT | Performed by: FAMILY MEDICINE

## 2018-11-12 PROCEDURE — 36415 COLL VENOUS BLD VENIPUNCTURE: CPT | Performed by: FAMILY MEDICINE

## 2018-11-12 RX ORDER — LORATADINE 10 MG/1
10 TABLET ORAL DAILY
COMMUNITY

## 2018-11-12 ASSESSMENT — ENCOUNTER SYMPTOMS
VISUAL CHANGE: 0
COUGH: 0
CONSTIPATION: 0
EYE ITCHING: 0
WHEEZING: 0
COLOR CHANGE: 0
HEMATOCHEZIA: 1
EYE PAIN: 0
ABDOMINAL PAIN: 1
EYE REDNESS: 0
SINUS PRESSURE: 0
SHORTNESS OF BREATH: 0
DIARRHEA: 1
APNEA: 0
VOMITING: 0
BACK PAIN: 1
CHEST TIGHTNESS: 0
RHINORRHEA: 0
SORE THROAT: 0
NAUSEA: 0
SWOLLEN GLANDS: 0
BLOOD IN STOOL: 0
CHANGE IN BOWEL HABIT: 1
BLOATING: 1

## 2018-11-12 ASSESSMENT — PATIENT HEALTH QUESTIONNAIRE - PHQ9
2. FEELING DOWN, DEPRESSED OR HOPELESS: 0
SUM OF ALL RESPONSES TO PHQ QUESTIONS 1-9: 0
1. LITTLE INTEREST OR PLEASURE IN DOING THINGS: 0
SUM OF ALL RESPONSES TO PHQ QUESTIONS 1-9: 0
SUM OF ALL RESPONSES TO PHQ9 QUESTIONS 1 & 2: 0

## 2018-11-12 NOTE — PROGRESS NOTES
Chief Complaint:     Chief Complaint   Patient presents with    Irritable Bowel Syndrome     saw dr Jack Weiss and had colonoscopy 3 years ago, has had some blood when wiping, and having stomach pain.  Hypothyroidism     would like to discuss.  Fatigue    Other         Fatigue   This is a chronic problem. The current episode started more than 1 month ago. The problem occurs intermittently. The problem has been waxing and waning. Associated symptoms include abdominal pain, a change in bowel habit and fatigue. Pertinent negatives include no arthralgias, chest pain, chills, congestion, coughing, diaphoresis, fever, headaches, myalgias, nausea, neck pain, numbness, rash, sore throat, swollen glands, urinary symptoms, vertigo, visual change, vomiting or weakness. Nothing aggravates the symptoms. She has tried nothing for the symptoms. The treatment provided no relief. Other   This is a chronic (thyroid) problem. The current episode started more than 1 year ago. The problem occurs daily. The problem has been unchanged. Associated symptoms include abdominal pain, a change in bowel habit and fatigue. Pertinent negatives include no arthralgias, chest pain, chills, congestion, coughing, diaphoresis, fever, headaches, myalgias, nausea, neck pain, numbness, rash, sore throat, swollen glands, urinary symptoms, vertigo, visual change, vomiting or weakness. Nothing aggravates the symptoms. She has tried nothing for the symptoms. The treatment provided no relief. GI Problem   The primary symptoms include fatigue, abdominal pain, diarrhea and hematochezia. Primary symptoms do not include fever, nausea, vomiting, dysuria, myalgias, arthralgias or rash. The illness began more than 7 days ago. The onset was gradual.   The fatigue began more than 1 week ago. The abdominal pain began more than 2 days ago. The abdominal pain is generalized. The abdominal pain does not radiate. The hematochezia began more than 1 week ago.  The

## 2018-11-13 LAB
ALBUMIN SERPL-MCNC: 4.4 G/DL (ref 3.5–5.2)
ALP BLD-CCNC: 67 U/L (ref 35–104)
ALT SERPL-CCNC: 18 U/L (ref 0–32)
ANION GAP SERPL CALCULATED.3IONS-SCNC: 14 MMOL/L (ref 7–16)
AST SERPL-CCNC: 17 U/L (ref 0–31)
BILIRUB SERPL-MCNC: 0.3 MG/DL (ref 0–1.2)
BUN BLDV-MCNC: 9 MG/DL (ref 6–20)
CALCIUM SERPL-MCNC: 9.2 MG/DL (ref 8.6–10.2)
CHLORIDE BLD-SCNC: 100 MMOL/L (ref 98–107)
CHOLESTEROL, TOTAL: 215 MG/DL (ref 0–199)
CO2: 25 MMOL/L (ref 22–29)
CREAT SERPL-MCNC: 0.6 MG/DL (ref 0.5–1)
GFR AFRICAN AMERICAN: >60
GFR NON-AFRICAN AMERICAN: >60 ML/MIN/1.73
GLUCOSE BLD-MCNC: 84 MG/DL (ref 74–99)
HCT VFR BLD CALC: 41 % (ref 34–48)
HDLC SERPL-MCNC: 44 MG/DL
HEMOGLOBIN: 12.7 G/DL (ref 11.5–15.5)
LDL CHOLESTEROL CALCULATED: 146 MG/DL (ref 0–99)
MCH RBC QN AUTO: 28.1 PG (ref 26–35)
MCHC RBC AUTO-ENTMCNC: 31 % (ref 32–34.5)
MCV RBC AUTO: 90.7 FL (ref 80–99.9)
PDW BLD-RTO: 12 FL (ref 11.5–15)
PLATELET # BLD: 212 E9/L (ref 130–450)
PMV BLD AUTO: 11.6 FL (ref 7–12)
POTASSIUM SERPL-SCNC: 3.8 MMOL/L (ref 3.5–5)
RBC # BLD: 4.52 E12/L (ref 3.5–5.5)
SODIUM BLD-SCNC: 139 MMOL/L (ref 132–146)
TOTAL PROTEIN: 7.9 G/DL (ref 6.4–8.3)
TRIGL SERPL-MCNC: 127 MG/DL (ref 0–149)
TSH SERPL DL<=0.05 MIU/L-ACNC: 0.38 UIU/ML (ref 0.27–4.2)
VLDLC SERPL CALC-MCNC: 25 MG/DL
WBC # BLD: 7.1 E9/L (ref 4.5–11.5)

## 2018-11-13 PROCEDURE — 80061 LIPID PANEL: CPT

## 2018-11-13 PROCEDURE — 84443 ASSAY THYROID STIM HORMONE: CPT

## 2018-11-13 PROCEDURE — 80053 COMPREHEN METABOLIC PANEL: CPT

## 2018-11-13 PROCEDURE — 85027 COMPLETE CBC AUTOMATED: CPT

## 2018-11-19 ENCOUNTER — HOSPITAL ENCOUNTER (OUTPATIENT)
Dept: CT IMAGING | Age: 28
Discharge: HOME | End: 2018-11-21
Payer: COMMERCIAL

## 2018-11-19 ENCOUNTER — HOSPITAL ENCOUNTER (OUTPATIENT)
Dept: ULTRASOUND IMAGING | Age: 28
Discharge: HOME | End: 2018-11-21
Payer: COMMERCIAL

## 2018-11-19 DIAGNOSIS — E04.9 THYROID GOITER: ICD-10-CM

## 2018-11-19 DIAGNOSIS — R49.0 HOARSENESS: ICD-10-CM

## 2018-11-19 DIAGNOSIS — K62.5 RECTAL BLEED: ICD-10-CM

## 2018-11-19 DIAGNOSIS — R10.30 LOWER ABDOMINAL PAIN: ICD-10-CM

## 2018-11-19 PROCEDURE — 76536 US EXAM OF HEAD AND NECK: CPT

## 2018-11-21 ENCOUNTER — HOSPITAL ENCOUNTER (OUTPATIENT)
Dept: CT IMAGING | Age: 28
Discharge: HOME | End: 2018-11-23
Payer: COMMERCIAL

## 2018-11-21 PROCEDURE — 6360000004 HC RX CONTRAST MEDICATION: Performed by: RADIOLOGY

## 2018-11-21 PROCEDURE — 74177 CT ABD & PELVIS W/CONTRAST: CPT

## 2018-11-21 RX ADMIN — IOPAMIDOL 110 ML: 755 INJECTION, SOLUTION INTRAVENOUS at 17:15

## 2018-11-21 RX ADMIN — IOHEXOL 50 ML: 240 INJECTION, SOLUTION INTRATHECAL; INTRAVASCULAR; INTRAVENOUS; ORAL at 17:14

## 2018-11-23 ENCOUNTER — HOSPITAL ENCOUNTER (OUTPATIENT)
Dept: ULTRASOUND IMAGING | Age: 28
Discharge: HOME | End: 2018-11-25
Payer: COMMERCIAL

## 2018-11-23 DIAGNOSIS — R22.1 NECK MASS: ICD-10-CM

## 2018-11-23 PROCEDURE — 88305 TISSUE EXAM BY PATHOLOGIST: CPT

## 2018-11-23 PROCEDURE — 88185 FLOWCYTOMETRY/TC ADD-ON: CPT

## 2018-11-23 PROCEDURE — 88182 CELL MARKER STUDY: CPT

## 2018-11-23 PROCEDURE — 88173 CYTOPATH EVAL FNA REPORT: CPT

## 2018-11-23 PROCEDURE — 76942 ECHO GUIDE FOR BIOPSY: CPT

## 2018-11-23 PROCEDURE — 88184 FLOWCYTOMETRY/ TC 1 MARKER: CPT

## 2018-11-27 ENCOUNTER — OFFICE VISIT (OUTPATIENT)
Dept: CARDIOLOGY CLINIC | Age: 28
End: 2018-11-27
Payer: COMMERCIAL

## 2018-11-27 VITALS
DIASTOLIC BLOOD PRESSURE: 78 MMHG | SYSTOLIC BLOOD PRESSURE: 120 MMHG | RESPIRATION RATE: 16 BRPM | BODY MASS INDEX: 30.12 KG/M2 | HEART RATE: 76 BPM | HEIGHT: 63 IN | WEIGHT: 170 LBS

## 2018-11-27 DIAGNOSIS — R00.2 PALPITATIONS: Primary | ICD-10-CM

## 2018-11-27 PROCEDURE — 99214 OFFICE O/P EST MOD 30 MIN: CPT | Performed by: INTERNAL MEDICINE

## 2018-11-27 PROCEDURE — 93000 ELECTROCARDIOGRAM COMPLETE: CPT | Performed by: INTERNAL MEDICINE

## 2018-11-27 NOTE — PROGRESS NOTES
CHIEF COMPLAINT: Pre-op/MVP    HISTORY OF PRESENT ILLNESS: Patient is a 34 y.o. female seen at the request of [address], DO. Hx of MVP and palpitations. No CP or SOB. Past Medical History:   Diagnosis Date    Asthma     excersise induced and allergy induced    Enlarged lymph node     at left neck, to have a bx 11/23/2018    Environmental allergies     IBS (irritable bowel syndrome)     Mitral valve prolapse     no regurgitation, with anxiety or caffeine developes palpitations,followed with Dr. Yoselin Graham, has appt with Dr. Becca Parekh 11/27/2018    Palpitations 06/06/2014    caffeine induced    Sore throat     frequent    Thyroid disease        Patient Active Problem List   Diagnosis    Acute non-recurrent maxillary sinusitis    Fatigue    Right ear pain    Acquired hypothyroidism    Vitamin D deficiency    Intercostal neuralgia    Gastroesophageal reflux disease without esophagitis    Vertigo    Hoarseness    Thyroid goiter    Rectal bleed    Lower abdominal pain       Allergies   Allergen Reactions    Cefdinir Hives     Had reaction as a child    Lomedia 24 Fe [Norethin Ace-Eth Estrad-Fe] Hives       Current Outpatient Prescriptions   Medication Sig Dispense Refill    loratadine (CLARITIN) 10 MG tablet Take 10 mg by mouth daily      SYNTHROID 100 MCG tablet TAKE ONE TABLET BY MOUTH ONE TIME A DAY 90 tablet 3    fluticasone (FLONASE) 50 MCG/ACT nasal spray 2 sprays by Nasal route daily 1 Bottle 1    albuterol sulfate HFA (PROAIR HFA) 108 (90 BASE) MCG/ACT inhaler Inhale 2 puffs into the lungs every 6 hours as needed for Wheezing 1 Inhaler 3     No current facility-administered medications for this visit.         Social History     Social History    Marital status:      Spouse name: N/A    Number of children: N/A    Years of education: N/A     Occupational History     [employer]     Social History Main Topics    Smoking status: Never guarding. Musculoskeletal: Normal range of motion. No edema and no tenderness. Lymphadenopathy:   No cervical adenopathy. No groin adenopathy. Neurological: Alert and oriented to person, place, and time. Skin: Skin is warm and dry. No rash noted. Not diaphoretic. No erythema. Psychiatric: Normal mood and affect. Behavior is normal.     EKG:  normal EKG, normal sinus rhythm. ASSESSMENT AND PLAN:  Patient Active Problem List   Diagnosis    Acute non-recurrent maxillary sinusitis    Fatigue    Right ear pain    Acquired hypothyroidism    Vitamin D deficiency    Intercostal neuralgia    Gastroesophageal reflux disease without esophagitis    Vertigo    Hoarseness    Thyroid goiter    Rectal bleed    Lower abdominal pain     1. Hx of palpitation: Stable. 2. Hx of MVP: Stable echo 11/18/16    3. Family Hx of CAD    4. Exercise induced asthma    5. Pre-op:   Low risk stress 11/18/16. Patient is an acceptable risk to proceed with surgery. Gume Horton D.O.   Cardiologist  Cardiology, [facility]

## 2018-11-28 LAB
Lab: NORMAL
REPORT: NORMAL
THIS TEST SENT TO: NORMAL

## 2018-11-29 ENCOUNTER — ANESTHESIA EVENT (OUTPATIENT)
Dept: OPERATING ROOM | Age: 28
End: 2018-11-29
Payer: COMMERCIAL

## 2018-11-29 ENCOUNTER — HOSPITAL ENCOUNTER (OUTPATIENT)
Dept: ULTRASOUND IMAGING | Age: 28
Discharge: HOME | End: 2018-12-01
Payer: COMMERCIAL

## 2018-11-29 ENCOUNTER — ANESTHESIA (OUTPATIENT)
Dept: OPERATING ROOM | Age: 28
End: 2018-11-29
Payer: COMMERCIAL

## 2018-11-29 ENCOUNTER — HOSPITAL ENCOUNTER (OUTPATIENT)
Age: 28
Setting detail: OUTPATIENT SURGERY
Discharge: HOME | End: 2018-11-29
Attending: OTOLARYNGOLOGY | Admitting: OTOLARYNGOLOGY
Payer: COMMERCIAL

## 2018-11-29 VITALS
HEART RATE: 75 BPM | HEIGHT: 63 IN | OXYGEN SATURATION: 98 % | WEIGHT: 167 LBS | TEMPERATURE: 97 F | SYSTOLIC BLOOD PRESSURE: 107 MMHG | DIASTOLIC BLOOD PRESSURE: 75 MMHG | RESPIRATION RATE: 18 BRPM | BODY MASS INDEX: 29.59 KG/M2

## 2018-11-29 VITALS — DIASTOLIC BLOOD PRESSURE: 74 MMHG | OXYGEN SATURATION: 92 % | TEMPERATURE: 94.3 F | SYSTOLIC BLOOD PRESSURE: 117 MMHG

## 2018-11-29 DIAGNOSIS — R59.9 ENLARGED LYMPH NODE: ICD-10-CM

## 2018-11-29 DIAGNOSIS — G89.18 POST-OP PAIN: Primary | ICD-10-CM

## 2018-11-29 LAB — HCG(URINE) PREGNANCY TEST: NEGATIVE

## 2018-11-29 PROCEDURE — 2500000003 HC RX 250 WO HCPCS: Performed by: NURSE ANESTHETIST, CERTIFIED REGISTERED

## 2018-11-29 PROCEDURE — 6360000002 HC RX W HCPCS: Performed by: ANESTHESIOLOGY

## 2018-11-29 PROCEDURE — 6360000002 HC RX W HCPCS: Performed by: NURSE ANESTHETIST, CERTIFIED REGISTERED

## 2018-11-29 PROCEDURE — 3600000002 HC SURGERY LEVEL 2 BASE: Performed by: OTOLARYNGOLOGY

## 2018-11-29 PROCEDURE — 3700000001 HC ADD 15 MINUTES (ANESTHESIA): Performed by: OTOLARYNGOLOGY

## 2018-11-29 PROCEDURE — 88304 TISSUE EXAM BY PATHOLOGIST: CPT

## 2018-11-29 PROCEDURE — 2709999900 HC NON-CHARGEABLE SUPPLY: Performed by: OTOLARYNGOLOGY

## 2018-11-29 PROCEDURE — 7100000010 HC PHASE II RECOVERY - FIRST 15 MIN: Performed by: OTOLARYNGOLOGY

## 2018-11-29 PROCEDURE — 7100000011 HC PHASE II RECOVERY - ADDTL 15 MIN: Performed by: OTOLARYNGOLOGY

## 2018-11-29 PROCEDURE — 2580000003 HC RX 258: Performed by: OTOLARYNGOLOGY

## 2018-11-29 PROCEDURE — 7100000000 HC PACU RECOVERY - FIRST 15 MIN: Performed by: OTOLARYNGOLOGY

## 2018-11-29 PROCEDURE — 2500000003 HC RX 250 WO HCPCS: Performed by: OTOLARYNGOLOGY

## 2018-11-29 PROCEDURE — 88305 TISSUE EXAM BY PATHOLOGIST: CPT

## 2018-11-29 PROCEDURE — 81025 URINE PREGNANCY TEST: CPT

## 2018-11-29 PROCEDURE — 3700000000 HC ANESTHESIA ATTENDED CARE: Performed by: OTOLARYNGOLOGY

## 2018-11-29 PROCEDURE — 7100000001 HC PACU RECOVERY - ADDTL 15 MIN: Performed by: OTOLARYNGOLOGY

## 2018-11-29 PROCEDURE — 76536 US EXAM OF HEAD AND NECK: CPT

## 2018-11-29 PROCEDURE — 6370000000 HC RX 637 (ALT 250 FOR IP): Performed by: ANESTHESIOLOGY

## 2018-11-29 PROCEDURE — 2580000003 HC RX 258: Performed by: NURSE ANESTHETIST, CERTIFIED REGISTERED

## 2018-11-29 PROCEDURE — 3600000012 HC SURGERY LEVEL 2 ADDTL 15MIN: Performed by: OTOLARYNGOLOGY

## 2018-11-29 RX ORDER — MEPERIDINE HYDROCHLORIDE 25 MG/ML
12.5 INJECTION INTRAMUSCULAR; INTRAVENOUS; SUBCUTANEOUS EVERY 5 MIN PRN
Status: DISCONTINUED | OUTPATIENT
Start: 2018-11-29 | End: 2018-11-29 | Stop reason: HOSPADM

## 2018-11-29 RX ORDER — SODIUM CHLORIDE 0.9 % (FLUSH) 0.9 %
10 SYRINGE (ML) INJECTION PRN
Status: CANCELLED | OUTPATIENT
Start: 2018-11-29

## 2018-11-29 RX ORDER — CLINDAMYCIN HYDROCHLORIDE 300 MG/1
300 CAPSULE ORAL 2 TIMES DAILY
Qty: 14 CAPSULE | Refills: 0 | Status: ON HOLD | OUTPATIENT
Start: 2018-11-29 | End: 2018-12-07 | Stop reason: HOSPADM

## 2018-11-29 RX ORDER — CLINDAMYCIN PHOSPHATE 900 MG/50ML
900 INJECTION INTRAVENOUS
Status: CANCELLED | OUTPATIENT
Start: 2018-11-29 | End: 2018-11-29

## 2018-11-29 RX ORDER — ONDANSETRON 2 MG/ML
INJECTION INTRAMUSCULAR; INTRAVENOUS PRN
Status: DISCONTINUED | OUTPATIENT
Start: 2018-11-29 | End: 2018-11-29 | Stop reason: SDUPTHER

## 2018-11-29 RX ORDER — LIDOCAINE HYDROCHLORIDE 20 MG/ML
INJECTION, SOLUTION INFILTRATION; PERINEURAL PRN
Status: DISCONTINUED | OUTPATIENT
Start: 2018-11-29 | End: 2018-11-29 | Stop reason: SDUPTHER

## 2018-11-29 RX ORDER — CLINDAMYCIN HYDROCHLORIDE 300 MG/1
300 CAPSULE ORAL 2 TIMES DAILY
Qty: 14 CAPSULE | Refills: 0 | Status: SHIPPED | OUTPATIENT
Start: 2018-11-29 | End: 2018-11-29

## 2018-11-29 RX ORDER — SODIUM CHLORIDE, SODIUM LACTATE, POTASSIUM CHLORIDE, CALCIUM CHLORIDE 600; 310; 30; 20 MG/100ML; MG/100ML; MG/100ML; MG/100ML
INJECTION, SOLUTION INTRAVENOUS CONTINUOUS
Status: DISCONTINUED | OUTPATIENT
Start: 2018-11-29 | End: 2018-11-29 | Stop reason: HOSPADM

## 2018-11-29 RX ORDER — DEXAMETHASONE SODIUM PHOSPHATE 4 MG/ML
INJECTION, SOLUTION INTRA-ARTICULAR; INTRALESIONAL; INTRAMUSCULAR; INTRAVENOUS; SOFT TISSUE PRN
Status: DISCONTINUED | OUTPATIENT
Start: 2018-11-29 | End: 2018-11-29 | Stop reason: SDUPTHER

## 2018-11-29 RX ORDER — SODIUM CHLORIDE 0.9 % (FLUSH) 0.9 %
10 SYRINGE (ML) INJECTION EVERY 12 HOURS SCHEDULED
Status: CANCELLED | OUTPATIENT
Start: 2018-11-29

## 2018-11-29 RX ORDER — ONDANSETRON 4 MG/1
4 TABLET, FILM COATED ORAL ONCE
Status: COMPLETED | OUTPATIENT
Start: 2018-11-29 | End: 2018-11-29

## 2018-11-29 RX ORDER — GLYCOPYRROLATE 0.2 MG/ML
INJECTION INTRAMUSCULAR; INTRAVENOUS PRN
Status: DISCONTINUED | OUTPATIENT
Start: 2018-11-29 | End: 2018-11-29 | Stop reason: SDUPTHER

## 2018-11-29 RX ORDER — DIPHENHYDRAMINE HYDROCHLORIDE 50 MG/ML
12.5 INJECTION INTRAMUSCULAR; INTRAVENOUS
Status: COMPLETED | OUTPATIENT
Start: 2018-11-29 | End: 2018-11-29

## 2018-11-29 RX ORDER — HYDROCODONE BITARTRATE AND ACETAMINOPHEN 5; 325 MG/1; MG/1
1 TABLET ORAL PRN
Status: COMPLETED | OUTPATIENT
Start: 2018-11-29 | End: 2018-11-29

## 2018-11-29 RX ORDER — ALBUTEROL SULFATE 90 UG/1
2 AEROSOL, METERED RESPIRATORY (INHALATION) EVERY 6 HOURS PRN
Qty: 1 INHALER | Refills: 5 | Status: SHIPPED | OUTPATIENT
Start: 2018-11-29 | End: 2018-11-29

## 2018-11-29 RX ORDER — SODIUM CHLORIDE 9 MG/ML
INJECTION, SOLUTION INTRAVENOUS CONTINUOUS PRN
Status: DISCONTINUED | OUTPATIENT
Start: 2018-11-29 | End: 2018-11-29 | Stop reason: SDUPTHER

## 2018-11-29 RX ORDER — ROCURONIUM BROMIDE 10 MG/ML
INJECTION, SOLUTION INTRAVENOUS PRN
Status: DISCONTINUED | OUTPATIENT
Start: 2018-11-29 | End: 2018-11-29 | Stop reason: SDUPTHER

## 2018-11-29 RX ORDER — CLINDAMYCIN PHOSPHATE 900 MG/50ML
900 INJECTION INTRAVENOUS
Status: COMPLETED | OUTPATIENT
Start: 2018-11-29 | End: 2018-11-29

## 2018-11-29 RX ORDER — ALBUTEROL SULFATE 90 UG/1
2 AEROSOL, METERED RESPIRATORY (INHALATION) EVERY 6 HOURS PRN
Qty: 1 INHALER | Refills: 3 | Status: SHIPPED | OUTPATIENT
Start: 2018-11-29 | End: 2021-03-26 | Stop reason: ALTCHOICE

## 2018-11-29 RX ORDER — PROPOFOL 10 MG/ML
INJECTION, EMULSION INTRAVENOUS PRN
Status: DISCONTINUED | OUTPATIENT
Start: 2018-11-29 | End: 2018-11-29 | Stop reason: SDUPTHER

## 2018-11-29 RX ORDER — SODIUM CHLORIDE 0.9 % (FLUSH) 0.9 %
10 SYRINGE (ML) INJECTION EVERY 12 HOURS SCHEDULED
Status: DISCONTINUED | OUTPATIENT
Start: 2018-11-29 | End: 2018-11-29 | Stop reason: HOSPADM

## 2018-11-29 RX ORDER — ONDANSETRON 4 MG/1
4 TABLET, ORALLY DISINTEGRATING ORAL 3 TIMES DAILY PRN
Qty: 21 TABLET | Refills: 0 | Status: SHIPPED | OUTPATIENT
Start: 2018-11-29 | End: 2019-05-09

## 2018-11-29 RX ORDER — ONDANSETRON 4 MG/1
4 TABLET, ORALLY DISINTEGRATING ORAL 3 TIMES DAILY PRN
Qty: 21 TABLET | Refills: 0 | Status: SHIPPED | OUTPATIENT
Start: 2018-11-29 | End: 2018-11-29

## 2018-11-29 RX ORDER — FENTANYL CITRATE 50 UG/ML
INJECTION, SOLUTION INTRAMUSCULAR; INTRAVENOUS PRN
Status: DISCONTINUED | OUTPATIENT
Start: 2018-11-29 | End: 2018-11-29 | Stop reason: SDUPTHER

## 2018-11-29 RX ORDER — HYDROCODONE BITARTRATE AND ACETAMINOPHEN 5; 325 MG/1; MG/1
2 TABLET ORAL PRN
Status: COMPLETED | OUTPATIENT
Start: 2018-11-29 | End: 2018-11-29

## 2018-11-29 RX ORDER — MIDAZOLAM HYDROCHLORIDE 1 MG/ML
INJECTION INTRAMUSCULAR; INTRAVENOUS PRN
Status: DISCONTINUED | OUTPATIENT
Start: 2018-11-29 | End: 2018-11-29 | Stop reason: SDUPTHER

## 2018-11-29 RX ORDER — SODIUM CHLORIDE 0.9 % (FLUSH) 0.9 %
10 SYRINGE (ML) INJECTION PRN
Status: DISCONTINUED | OUTPATIENT
Start: 2018-11-29 | End: 2018-11-29 | Stop reason: HOSPADM

## 2018-11-29 RX ADMIN — ONDANSETRON HYDROCHLORIDE 4 MG: 2 INJECTION, SOLUTION INTRAMUSCULAR; INTRAVENOUS at 10:19

## 2018-11-29 RX ADMIN — LIDOCAINE HYDROCHLORIDE 100 MG: 20 INJECTION, SOLUTION INFILTRATION; PERINEURAL at 10:18

## 2018-11-29 RX ADMIN — DIPHENHYDRAMINE HYDROCHLORIDE 12.5 MG: 50 INJECTION INTRAMUSCULAR; INTRAVENOUS at 11:38

## 2018-11-29 RX ADMIN — PROPOFOL 150 MG: 10 INJECTION, EMULSION INTRAVENOUS at 10:18

## 2018-11-29 RX ADMIN — HYDROMORPHONE HYDROCHLORIDE 0.5 MG: 1 INJECTION, SOLUTION INTRAMUSCULAR; INTRAVENOUS; SUBCUTANEOUS at 12:13

## 2018-11-29 RX ADMIN — DEXAMETHASONE SODIUM PHOSPHATE 10 MG: 4 INJECTION, SOLUTION INTRAMUSCULAR; INTRAVENOUS at 10:19

## 2018-11-29 RX ADMIN — MIDAZOLAM HYDROCHLORIDE 2 MG: 1 INJECTION, SOLUTION INTRAMUSCULAR; INTRAVENOUS at 10:15

## 2018-11-29 RX ADMIN — ONDANSETRON HYDROCHLORIDE 4 MG: 4 TABLET, FILM COATED ORAL at 13:45

## 2018-11-29 RX ADMIN — CLINDAMYCIN IN 5 PERCENT DEXTROSE 900 MG: 18 INJECTION, SOLUTION INTRAVENOUS at 10:15

## 2018-11-29 RX ADMIN — FENTANYL CITRATE 100 MCG: 50 INJECTION, SOLUTION INTRAMUSCULAR; INTRAVENOUS at 10:18

## 2018-11-29 RX ADMIN — HYDROMORPHONE HYDROCHLORIDE 0.5 MG: 1 INJECTION, SOLUTION INTRAMUSCULAR; INTRAVENOUS; SUBCUTANEOUS at 11:25

## 2018-11-29 RX ADMIN — HYDROMORPHONE HYDROCHLORIDE 0.5 MG: 1 INJECTION, SOLUTION INTRAMUSCULAR; INTRAVENOUS; SUBCUTANEOUS at 11:57

## 2018-11-29 RX ADMIN — SODIUM CHLORIDE: 9 INJECTION, SOLUTION INTRAVENOUS at 10:15

## 2018-11-29 RX ADMIN — FENTANYL CITRATE 50 MCG: 50 INJECTION, SOLUTION INTRAMUSCULAR; INTRAVENOUS at 10:26

## 2018-11-29 RX ADMIN — GLYCOPYRROLATE 0.2 MG: 0.2 INJECTION, SOLUTION INTRAMUSCULAR; INTRAVENOUS at 10:19

## 2018-11-29 RX ADMIN — HYDROCODONE BITARTRATE AND ACETAMINOPHEN 2 TABLET: 5; 325 TABLET ORAL at 12:44

## 2018-11-29 RX ADMIN — SODIUM CHLORIDE, POTASSIUM CHLORIDE, SODIUM LACTATE AND CALCIUM CHLORIDE: 600; 310; 30; 20 INJECTION, SOLUTION INTRAVENOUS at 10:53

## 2018-11-29 RX ADMIN — ROCURONIUM BROMIDE 30 MG: 10 SOLUTION INTRAVENOUS at 10:18

## 2018-11-29 RX ADMIN — PROPOFOL 50 MG: 10 INJECTION, EMULSION INTRAVENOUS at 10:22

## 2018-11-29 ASSESSMENT — PAIN SCALES - GENERAL
PAINLEVEL_OUTOF10: 7
PAINLEVEL_OUTOF10: 3
PAINLEVEL_OUTOF10: 5
PAINLEVEL_OUTOF10: 8
PAINLEVEL_OUTOF10: 6
PAINLEVEL_OUTOF10: 5
PAINLEVEL_OUTOF10: 6

## 2018-11-29 ASSESSMENT — PULMONARY FUNCTION TESTS
PIF_VALUE: 9
PIF_VALUE: 20
PIF_VALUE: 2
PIF_VALUE: 21
PIF_VALUE: 10
PIF_VALUE: 20
PIF_VALUE: 3
PIF_VALUE: 2
PIF_VALUE: 2
PIF_VALUE: 20
PIF_VALUE: 24
PIF_VALUE: 19
PIF_VALUE: 19
PIF_VALUE: 21
PIF_VALUE: 3
PIF_VALUE: 1
PIF_VALUE: 21
PIF_VALUE: 21
PIF_VALUE: 19
PIF_VALUE: 21
PIF_VALUE: 8
PIF_VALUE: 20
PIF_VALUE: 19
PIF_VALUE: 20
PIF_VALUE: 1
PIF_VALUE: 21
PIF_VALUE: 21
PIF_VALUE: 1
PIF_VALUE: 20
PIF_VALUE: 21
PIF_VALUE: 1
PIF_VALUE: 20
PIF_VALUE: 6
PIF_VALUE: 18
PIF_VALUE: 20
PIF_VALUE: 21
PIF_VALUE: 20
PIF_VALUE: 4
PIF_VALUE: 20
PIF_VALUE: 21
PIF_VALUE: 1
PIF_VALUE: 21
PIF_VALUE: 3
PIF_VALUE: 19
PIF_VALUE: 19
PIF_VALUE: 18
PIF_VALUE: 14
PIF_VALUE: 19
PIF_VALUE: 1
PIF_VALUE: 19
PIF_VALUE: 1
PIF_VALUE: 17
PIF_VALUE: 19
PIF_VALUE: 20

## 2018-11-29 ASSESSMENT — PAIN DESCRIPTION - PAIN TYPE
TYPE: SURGICAL PAIN

## 2018-11-29 ASSESSMENT — PAIN DESCRIPTION - PROGRESSION
CLINICAL_PROGRESSION: GRADUALLY IMPROVING

## 2018-11-29 ASSESSMENT — PAIN DESCRIPTION - LOCATION: LOCATION: THROAT;OTHER (COMMENT)

## 2018-11-29 ASSESSMENT — PAIN - FUNCTIONAL ASSESSMENT: PAIN_FUNCTIONAL_ASSESSMENT: 0-10

## 2018-11-29 ASSESSMENT — PAIN DESCRIPTION - ORIENTATION: ORIENTATION: LEFT

## 2018-11-29 ASSESSMENT — PAIN DESCRIPTION - DESCRIPTORS: DESCRIPTORS: ACHING;DISCOMFORT;SORE

## 2018-11-29 NOTE — H&P
The H&P has been reviewed, the patient has been examined, and I concur with the findings of the [id number] H&P.

## 2018-12-06 ENCOUNTER — ANESTHESIA (OUTPATIENT)
Dept: OPERATING ROOM | Age: 28
End: 2018-12-06

## 2018-12-06 ENCOUNTER — ANESTHESIA EVENT (OUTPATIENT)
Dept: OPERATING ROOM | Age: 28
End: 2018-12-06

## 2018-12-06 ENCOUNTER — HOSPITAL ENCOUNTER (OUTPATIENT)
Age: 28
Setting detail: OBSERVATION
Discharge: HOME | End: 2018-12-08
Attending: OTOLARYNGOLOGY | Admitting: OTOLARYNGOLOGY

## 2018-12-06 VITALS — DIASTOLIC BLOOD PRESSURE: 81 MMHG | SYSTOLIC BLOOD PRESSURE: 125 MMHG | OXYGEN SATURATION: 100 %

## 2018-12-06 DIAGNOSIS — Z90.89 S/P TONSILLECTOMY: Primary | ICD-10-CM

## 2018-12-06 LAB
ABO/RH: NORMAL
ANTIBODY SCREEN: NORMAL
HCG(URINE) PREGNANCY TEST: NEGATIVE
HCT VFR BLD CALC: 38.4 % (ref 34–48)
HEMOGLOBIN: 13 G/DL (ref 11.5–15.5)
MCH RBC QN AUTO: 29.3 PG (ref 26–35)
MCHC RBC AUTO-ENTMCNC: 33.9 % (ref 32–34.5)
MCV RBC AUTO: 86.7 FL (ref 80–99.9)
PDW BLD-RTO: 11.8 FL (ref 11.5–15)
PLATELET # BLD: 256 E9/L (ref 130–450)
PMV BLD AUTO: 10.3 FL (ref 7–12)
RBC # BLD: 4.43 E12/L (ref 3.5–5.5)
WBC # BLD: 9.1 E9/L (ref 4.5–11.5)

## 2018-12-06 PROCEDURE — 6360000002 HC RX W HCPCS: Performed by: OTOLARYNGOLOGY

## 2018-12-06 PROCEDURE — 3700000001 HC ADD 15 MINUTES (ANESTHESIA): Performed by: OTOLARYNGOLOGY

## 2018-12-06 PROCEDURE — 2580000003 HC RX 258: Performed by: STUDENT IN AN ORGANIZED HEALTH CARE EDUCATION/TRAINING PROGRAM

## 2018-12-06 PROCEDURE — 6360000002 HC RX W HCPCS: Performed by: ANESTHESIOLOGY

## 2018-12-06 PROCEDURE — 3600000012 HC SURGERY LEVEL 2 ADDTL 15MIN: Performed by: OTOLARYNGOLOGY

## 2018-12-06 PROCEDURE — 6370000000 HC RX 637 (ALT 250 FOR IP): Performed by: OTOLARYNGOLOGY

## 2018-12-06 PROCEDURE — 86850 RBC ANTIBODY SCREEN: CPT

## 2018-12-06 PROCEDURE — 7100000000 HC PACU RECOVERY - FIRST 15 MIN: Performed by: OTOLARYNGOLOGY

## 2018-12-06 PROCEDURE — 2709999900 HC NON-CHARGEABLE SUPPLY: Performed by: OTOLARYNGOLOGY

## 2018-12-06 PROCEDURE — 85027 COMPLETE CBC AUTOMATED: CPT

## 2018-12-06 PROCEDURE — 6360000002 HC RX W HCPCS: Performed by: NURSE ANESTHETIST, CERTIFIED REGISTERED

## 2018-12-06 PROCEDURE — 2580000003 HC RX 258: Performed by: NURSE ANESTHETIST, CERTIFIED REGISTERED

## 2018-12-06 PROCEDURE — 2720000010 HC SURG SUPPLY STERILE: Performed by: OTOLARYNGOLOGY

## 2018-12-06 PROCEDURE — 36415 COLL VENOUS BLD VENIPUNCTURE: CPT

## 2018-12-06 PROCEDURE — 2500000003 HC RX 250 WO HCPCS: Performed by: NURSE ANESTHETIST, CERTIFIED REGISTERED

## 2018-12-06 PROCEDURE — 2700000000 HC OXYGEN THERAPY PER DAY

## 2018-12-06 PROCEDURE — 7100000001 HC PACU RECOVERY - ADDTL 15 MIN: Performed by: OTOLARYNGOLOGY

## 2018-12-06 PROCEDURE — 86900 BLOOD TYPING SEROLOGIC ABO: CPT

## 2018-12-06 PROCEDURE — 6360000002 HC RX W HCPCS

## 2018-12-06 PROCEDURE — 6370000000 HC RX 637 (ALT 250 FOR IP): Performed by: STUDENT IN AN ORGANIZED HEALTH CARE EDUCATION/TRAINING PROGRAM

## 2018-12-06 PROCEDURE — 3600000002 HC SURGERY LEVEL 2 BASE: Performed by: OTOLARYNGOLOGY

## 2018-12-06 PROCEDURE — G0378 HOSPITAL OBSERVATION PER HR: HCPCS

## 2018-12-06 PROCEDURE — 86901 BLOOD TYPING SEROLOGIC RH(D): CPT

## 2018-12-06 PROCEDURE — 81025 URINE PREGNANCY TEST: CPT

## 2018-12-06 PROCEDURE — 3700000000 HC ANESTHESIA ATTENDED CARE: Performed by: OTOLARYNGOLOGY

## 2018-12-06 RX ORDER — BISACODYL 10 MG
10 SUPPOSITORY, RECTAL RECTAL DAILY PRN
Status: DISCONTINUED | OUTPATIENT
Start: 2018-12-06 | End: 2018-12-08 | Stop reason: HOSPADM

## 2018-12-06 RX ORDER — MEPERIDINE HYDROCHLORIDE 25 MG/ML
INJECTION INTRAMUSCULAR; INTRAVENOUS; SUBCUTANEOUS
Status: DISPENSED
Start: 2018-12-06 | End: 2018-12-07

## 2018-12-06 RX ORDER — SODIUM CHLORIDE 0.9 % (FLUSH) 0.9 %
10 SYRINGE (ML) INJECTION PRN
Status: DISCONTINUED | OUTPATIENT
Start: 2018-12-06 | End: 2018-12-08 | Stop reason: HOSPADM

## 2018-12-06 RX ORDER — DOCUSATE SODIUM 100 MG/1
100 CAPSULE, LIQUID FILLED ORAL 2 TIMES DAILY PRN
Status: DISCONTINUED | OUTPATIENT
Start: 2018-12-06 | End: 2018-12-08 | Stop reason: HOSPADM

## 2018-12-06 RX ORDER — PROMETHAZINE HYDROCHLORIDE 25 MG/ML
INJECTION, SOLUTION INTRAMUSCULAR; INTRAVENOUS
Status: COMPLETED
Start: 2018-12-06 | End: 2018-12-06

## 2018-12-06 RX ORDER — LIDOCAINE HYDROCHLORIDE 20 MG/ML
INJECTION, SOLUTION INFILTRATION; PERINEURAL PRN
Status: DISCONTINUED | OUTPATIENT
Start: 2018-12-06 | End: 2018-12-06 | Stop reason: SDUPTHER

## 2018-12-06 RX ORDER — MORPHINE SULFATE 2 MG/ML
2 INJECTION, SOLUTION INTRAMUSCULAR; INTRAVENOUS
Status: DISCONTINUED | OUTPATIENT
Start: 2018-12-06 | End: 2018-12-06 | Stop reason: DRUGHIGH

## 2018-12-06 RX ORDER — DIMETHICONE, OXYBENZONE, AND PADIMATE O 2; 2.5; 6.6 G/100G; G/100G; G/100G
STICK TOPICAL
Status: DISPENSED
Start: 2018-12-06 | End: 2018-12-07

## 2018-12-06 RX ORDER — SODIUM CHLORIDE, SODIUM LACTATE, POTASSIUM CHLORIDE, CALCIUM CHLORIDE 600; 310; 30; 20 MG/100ML; MG/100ML; MG/100ML; MG/100ML
INJECTION, SOLUTION INTRAVENOUS CONTINUOUS PRN
Status: DISCONTINUED | OUTPATIENT
Start: 2018-12-06 | End: 2018-12-06 | Stop reason: SDUPTHER

## 2018-12-06 RX ORDER — ROCURONIUM BROMIDE 10 MG/ML
INJECTION, SOLUTION INTRAVENOUS PRN
Status: DISCONTINUED | OUTPATIENT
Start: 2018-12-06 | End: 2018-12-06 | Stop reason: SDUPTHER

## 2018-12-06 RX ORDER — SODIUM CHLORIDE, SODIUM LACTATE, POTASSIUM CHLORIDE, CALCIUM CHLORIDE 600; 310; 30; 20 MG/100ML; MG/100ML; MG/100ML; MG/100ML
INJECTION, SOLUTION INTRAVENOUS CONTINUOUS
Status: DISCONTINUED | OUTPATIENT
Start: 2018-12-06 | End: 2018-12-08 | Stop reason: HOSPADM

## 2018-12-06 RX ORDER — DEXAMETHASONE SODIUM PHOSPHATE 4 MG/ML
INJECTION, SOLUTION INTRA-ARTICULAR; INTRALESIONAL; INTRAMUSCULAR; INTRAVENOUS; SOFT TISSUE PRN
Status: DISCONTINUED | OUTPATIENT
Start: 2018-12-06 | End: 2018-12-06 | Stop reason: SDUPTHER

## 2018-12-06 RX ORDER — ACETAMINOPHEN 325 MG/1
650 TABLET ORAL EVERY 4 HOURS
Status: DISCONTINUED | OUTPATIENT
Start: 2018-12-06 | End: 2018-12-08 | Stop reason: HOSPADM

## 2018-12-06 RX ORDER — MORPHINE SULFATE 4 MG/ML
4 INJECTION, SOLUTION INTRAMUSCULAR; INTRAVENOUS
Status: DISCONTINUED | OUTPATIENT
Start: 2018-12-06 | End: 2018-12-06 | Stop reason: DRUGHIGH

## 2018-12-06 RX ORDER — PROPOFOL 10 MG/ML
INJECTION, EMULSION INTRAVENOUS PRN
Status: DISCONTINUED | OUTPATIENT
Start: 2018-12-06 | End: 2018-12-06 | Stop reason: SDUPTHER

## 2018-12-06 RX ORDER — MORPHINE SULFATE 2 MG/ML
2 INJECTION, SOLUTION INTRAMUSCULAR; INTRAVENOUS
Status: DISCONTINUED | OUTPATIENT
Start: 2018-12-06 | End: 2018-12-08 | Stop reason: HOSPADM

## 2018-12-06 RX ORDER — SODIUM CHLORIDE 0.9 % (FLUSH) 0.9 %
10 SYRINGE (ML) INJECTION EVERY 12 HOURS SCHEDULED
Status: DISCONTINUED | OUTPATIENT
Start: 2018-12-06 | End: 2018-12-06 | Stop reason: HOSPADM

## 2018-12-06 RX ORDER — ONDANSETRON 2 MG/ML
4 INJECTION INTRAMUSCULAR; INTRAVENOUS
Status: DISCONTINUED | OUTPATIENT
Start: 2018-12-06 | End: 2018-12-06 | Stop reason: HOSPADM

## 2018-12-06 RX ORDER — ONDANSETRON 2 MG/ML
INJECTION INTRAMUSCULAR; INTRAVENOUS PRN
Status: DISCONTINUED | OUTPATIENT
Start: 2018-12-06 | End: 2018-12-06 | Stop reason: SDUPTHER

## 2018-12-06 RX ORDER — PROMETHAZINE HYDROCHLORIDE 25 MG/ML
6.25 INJECTION, SOLUTION INTRAMUSCULAR; INTRAVENOUS ONCE
Status: COMPLETED | OUTPATIENT
Start: 2018-12-06 | End: 2018-12-06

## 2018-12-06 RX ORDER — MEPERIDINE HYDROCHLORIDE 25 MG/ML
6.25 INJECTION INTRAMUSCULAR; INTRAVENOUS; SUBCUTANEOUS ONCE
Status: DISCONTINUED | OUTPATIENT
Start: 2018-12-06 | End: 2018-12-08 | Stop reason: HOSPADM

## 2018-12-06 RX ORDER — MORPHINE SULFATE 2 MG/ML
4 INJECTION, SOLUTION INTRAMUSCULAR; INTRAVENOUS
Status: DISCONTINUED | OUTPATIENT
Start: 2018-12-06 | End: 2018-12-08 | Stop reason: HOSPADM

## 2018-12-06 RX ORDER — TRAMADOL HYDROCHLORIDE 50 MG/1
100 TABLET ORAL EVERY 6 HOURS PRN
Status: DISCONTINUED | OUTPATIENT
Start: 2018-12-06 | End: 2018-12-08 | Stop reason: HOSPADM

## 2018-12-06 RX ORDER — SODIUM CHLORIDE 0.9 % (FLUSH) 0.9 %
10 SYRINGE (ML) INJECTION EVERY 12 HOURS SCHEDULED
Status: DISCONTINUED | OUTPATIENT
Start: 2018-12-06 | End: 2018-12-08 | Stop reason: HOSPADM

## 2018-12-06 RX ORDER — ALPRAZOLAM 1 MG/1
1 TABLET ORAL 3 TIMES DAILY PRN
Status: DISCONTINUED | OUTPATIENT
Start: 2018-12-06 | End: 2018-12-08 | Stop reason: HOSPADM

## 2018-12-06 RX ORDER — DEXAMETHASONE SODIUM PHOSPHATE 4 MG/ML
INJECTION, SOLUTION INTRA-ARTICULAR; INTRALESIONAL; INTRAMUSCULAR; INTRAVENOUS; SOFT TISSUE PRN
Status: DISCONTINUED | OUTPATIENT
Start: 2018-12-06 | End: 2018-12-06

## 2018-12-06 RX ORDER — TRAMADOL HYDROCHLORIDE 50 MG/1
50 TABLET ORAL EVERY 6 HOURS PRN
Status: DISCONTINUED | OUTPATIENT
Start: 2018-12-06 | End: 2018-12-08 | Stop reason: HOSPADM

## 2018-12-06 RX ORDER — NEOSTIGMINE METHYLSULFATE 1 MG/ML
INJECTION, SOLUTION INTRAVENOUS PRN
Status: DISCONTINUED | OUTPATIENT
Start: 2018-12-06 | End: 2018-12-06 | Stop reason: SDUPTHER

## 2018-12-06 RX ORDER — ONDANSETRON 2 MG/ML
4 INJECTION INTRAMUSCULAR; INTRAVENOUS EVERY 6 HOURS PRN
Status: DISCONTINUED | OUTPATIENT
Start: 2018-12-06 | End: 2018-12-08 | Stop reason: HOSPADM

## 2018-12-06 RX ORDER — GLYCOPYRROLATE 1 MG/5 ML
SYRINGE (ML) INTRAVENOUS PRN
Status: DISCONTINUED | OUTPATIENT
Start: 2018-12-06 | End: 2018-12-06 | Stop reason: SDUPTHER

## 2018-12-06 RX ORDER — SUCCINYLCHOLINE CHLORIDE 20 MG/ML
INJECTION INTRAMUSCULAR; INTRAVENOUS PRN
Status: DISCONTINUED | OUTPATIENT
Start: 2018-12-06 | End: 2018-12-06 | Stop reason: SDUPTHER

## 2018-12-06 RX ORDER — FENTANYL CITRATE 50 UG/ML
INJECTION, SOLUTION INTRAMUSCULAR; INTRAVENOUS PRN
Status: DISCONTINUED | OUTPATIENT
Start: 2018-12-06 | End: 2018-12-06 | Stop reason: SDUPTHER

## 2018-12-06 RX ORDER — SODIUM CHLORIDE 0.9 % (FLUSH) 0.9 %
10 SYRINGE (ML) INJECTION PRN
Status: DISCONTINUED | OUTPATIENT
Start: 2018-12-06 | End: 2018-12-06 | Stop reason: HOSPADM

## 2018-12-06 RX ORDER — TANNIC ACID
POWDER (GRAM) MISCELLANEOUS PRN
Status: DISCONTINUED | OUTPATIENT
Start: 2018-12-06 | End: 2018-12-06 | Stop reason: HOSPADM

## 2018-12-06 RX ADMIN — DEXAMETHASONE SODIUM PHOSPHATE 10 MG: 4 INJECTION, SOLUTION INTRAMUSCULAR; INTRAVENOUS at 18:51

## 2018-12-06 RX ADMIN — FENTANYL CITRATE 100 MCG: 50 INJECTION, SOLUTION INTRAMUSCULAR; INTRAVENOUS at 18:45

## 2018-12-06 RX ADMIN — Medication 0.6 MG: at 19:17

## 2018-12-06 RX ADMIN — PROPOFOL 200 MG: 10 INJECTION, EMULSION INTRAVENOUS at 18:47

## 2018-12-06 RX ADMIN — ALPRAZOLAM 1 MG: 1 TABLET ORAL at 22:18

## 2018-12-06 RX ADMIN — MORPHINE SULFATE 2 MG: 2 INJECTION, SOLUTION INTRAMUSCULAR; INTRAVENOUS at 20:47

## 2018-12-06 RX ADMIN — SODIUM CHLORIDE, POTASSIUM CHLORIDE, SODIUM LACTATE AND CALCIUM CHLORIDE: 600; 310; 30; 20 INJECTION, SOLUTION INTRAVENOUS at 20:48

## 2018-12-06 RX ADMIN — PROMETHAZINE HYDROCHLORIDE 6.25 MG: 25 INJECTION INTRAMUSCULAR; INTRAVENOUS at 19:47

## 2018-12-06 RX ADMIN — HYDROMORPHONE HYDROCHLORIDE 0.5 MG: 1 INJECTION, SOLUTION INTRAMUSCULAR; INTRAVENOUS; SUBCUTANEOUS at 19:39

## 2018-12-06 RX ADMIN — PROMETHAZINE HYDROCHLORIDE 6.25 MG: 25 INJECTION, SOLUTION INTRAMUSCULAR; INTRAVENOUS at 19:47

## 2018-12-06 RX ADMIN — ONDANSETRON HYDROCHLORIDE 4 MG: 2 INJECTION, SOLUTION INTRAMUSCULAR; INTRAVENOUS at 18:52

## 2018-12-06 RX ADMIN — ROCURONIUM BROMIDE 20 MG: 10 SOLUTION INTRAVENOUS at 18:50

## 2018-12-06 RX ADMIN — TRAMADOL HYDROCHLORIDE 100 MG: 50 TABLET, FILM COATED ORAL at 22:18

## 2018-12-06 RX ADMIN — SUCCINYLCHOLINE CHLORIDE 200 MG: 20 INJECTION, SOLUTION INTRAMUSCULAR; INTRAVENOUS at 18:47

## 2018-12-06 RX ADMIN — SODIUM CHLORIDE, POTASSIUM CHLORIDE, SODIUM LACTATE AND CALCIUM CHLORIDE: 600; 310; 30; 20 INJECTION, SOLUTION INTRAVENOUS at 18:33

## 2018-12-06 RX ADMIN — Medication 3 MG: at 19:17

## 2018-12-06 RX ADMIN — LIDOCAINE HYDROCHLORIDE 80 MG: 20 INJECTION, SOLUTION INFILTRATION; PERINEURAL at 18:45

## 2018-12-06 ASSESSMENT — PULMONARY FUNCTION TESTS
PIF_VALUE: 15
PIF_VALUE: 4
PIF_VALUE: 23
PIF_VALUE: 2
PIF_VALUE: 19
PIF_VALUE: 18
PIF_VALUE: 19
PIF_VALUE: 1
PIF_VALUE: 15
PIF_VALUE: 2
PIF_VALUE: 15
PIF_VALUE: 27
PIF_VALUE: 15
PIF_VALUE: 18
PIF_VALUE: 23
PIF_VALUE: 0
PIF_VALUE: 23
PIF_VALUE: 15
PIF_VALUE: 24
PIF_VALUE: 1
PIF_VALUE: 15
PIF_VALUE: 17
PIF_VALUE: 18
PIF_VALUE: 15
PIF_VALUE: 16
PIF_VALUE: 0
PIF_VALUE: 15
PIF_VALUE: 14
PIF_VALUE: 1
PIF_VALUE: 15
PIF_VALUE: 15
PIF_VALUE: 18
PIF_VALUE: 45
PIF_VALUE: 15
PIF_VALUE: 1
PIF_VALUE: 15
PIF_VALUE: 11
PIF_VALUE: 18
PIF_VALUE: 18
PIF_VALUE: 15

## 2018-12-06 ASSESSMENT — PAIN SCALES - GENERAL
PAINLEVEL_OUTOF10: 8
PAINLEVEL_OUTOF10: 6
PAINLEVEL_OUTOF10: 9

## 2018-12-07 LAB
HCT VFR BLD CALC: 38.7 % (ref 34–48)
HEMOGLOBIN: 12.5 G/DL (ref 11.5–15.5)

## 2018-12-07 PROCEDURE — G0378 HOSPITAL OBSERVATION PER HR: HCPCS

## 2018-12-07 PROCEDURE — 96374 THER/PROPH/DIAG INJ IV PUSH: CPT

## 2018-12-07 PROCEDURE — 6360000002 HC RX W HCPCS: Performed by: OTOLARYNGOLOGY

## 2018-12-07 PROCEDURE — 36415 COLL VENOUS BLD VENIPUNCTURE: CPT

## 2018-12-07 PROCEDURE — 2580000003 HC RX 258: Performed by: STUDENT IN AN ORGANIZED HEALTH CARE EDUCATION/TRAINING PROGRAM

## 2018-12-07 PROCEDURE — 85018 HEMOGLOBIN: CPT

## 2018-12-07 PROCEDURE — 85014 HEMATOCRIT: CPT

## 2018-12-07 PROCEDURE — 6370000000 HC RX 637 (ALT 250 FOR IP): Performed by: STUDENT IN AN ORGANIZED HEALTH CARE EDUCATION/TRAINING PROGRAM

## 2018-12-07 RX ORDER — TRAMADOL HYDROCHLORIDE 50 MG/1
50 TABLET ORAL EVERY 6 HOURS PRN
Qty: 28 TABLET | Refills: 0 | Status: SHIPPED | OUTPATIENT
Start: 2018-12-07 | End: 2018-12-14

## 2018-12-07 RX ADMIN — SODIUM CHLORIDE, POTASSIUM CHLORIDE, SODIUM LACTATE AND CALCIUM CHLORIDE: 600; 310; 30; 20 INJECTION, SOLUTION INTRAVENOUS at 10:52

## 2018-12-07 RX ADMIN — ALPRAZOLAM 1 MG: 1 TABLET ORAL at 08:40

## 2018-12-07 RX ADMIN — TRAMADOL HYDROCHLORIDE 100 MG: 50 TABLET, FILM COATED ORAL at 04:32

## 2018-12-07 RX ADMIN — SODIUM CHLORIDE, POTASSIUM CHLORIDE, SODIUM LACTATE AND CALCIUM CHLORIDE: 600; 310; 30; 20 INJECTION, SOLUTION INTRAVENOUS at 23:12

## 2018-12-07 RX ADMIN — MORPHINE SULFATE 2 MG: 2 INJECTION, SOLUTION INTRAMUSCULAR; INTRAVENOUS at 12:29

## 2018-12-07 RX ADMIN — TRAMADOL HYDROCHLORIDE 100 MG: 50 TABLET, FILM COATED ORAL at 16:51

## 2018-12-07 RX ADMIN — TRAMADOL HYDROCHLORIDE 100 MG: 50 TABLET, FILM COATED ORAL at 10:51

## 2018-12-07 RX ADMIN — ALPRAZOLAM 1 MG: 1 TABLET ORAL at 22:56

## 2018-12-07 RX ADMIN — SODIUM CHLORIDE, POTASSIUM CHLORIDE, SODIUM LACTATE AND CALCIUM CHLORIDE: 600; 310; 30; 20 INJECTION, SOLUTION INTRAVENOUS at 17:28

## 2018-12-07 RX ADMIN — TRAMADOL HYDROCHLORIDE 100 MG: 50 TABLET, FILM COATED ORAL at 22:56

## 2018-12-07 RX ADMIN — ACETAMINOPHEN 650 MG: 325 TABLET ORAL at 04:32

## 2018-12-07 ASSESSMENT — PAIN SCALES - GENERAL
PAINLEVEL_OUTOF10: 7
PAINLEVEL_OUTOF10: 7
PAINLEVEL_OUTOF10: 3
PAINLEVEL_OUTOF10: 0
PAINLEVEL_OUTOF10: 0
PAINLEVEL_OUTOF10: 6
PAINLEVEL_OUTOF10: 7
PAINLEVEL_OUTOF10: 8
PAINLEVEL_OUTOF10: 3

## 2018-12-07 ASSESSMENT — PAIN DESCRIPTION - ORIENTATION
ORIENTATION: RIGHT;LEFT
ORIENTATION: LEFT

## 2018-12-07 ASSESSMENT — PAIN DESCRIPTION - LOCATION
LOCATION: THROAT

## 2018-12-07 ASSESSMENT — PAIN DESCRIPTION - PAIN TYPE
TYPE: SURGICAL PAIN

## 2018-12-07 ASSESSMENT — PAIN DESCRIPTION - DESCRIPTORS
DESCRIPTORS: ACHING;DISCOMFORT;BURNING
DESCRIPTORS: ACHING;DISCOMFORT;CRUSHING
DESCRIPTORS: ACHING;DISCOMFORT;CONSTANT
DESCRIPTORS: ACHING;DISCOMFORT
DESCRIPTORS: SORE;DISCOMFORT

## 2018-12-07 ASSESSMENT — PAIN DESCRIPTION - PROGRESSION
CLINICAL_PROGRESSION: NOT CHANGED

## 2018-12-07 ASSESSMENT — PAIN DESCRIPTION - ONSET
ONSET: ON-GOING

## 2018-12-07 ASSESSMENT — PAIN DESCRIPTION - FREQUENCY
FREQUENCY: CONTINUOUS

## 2018-12-07 NOTE — ANESTHESIA POSTPROCEDURE EVALUATION
Department of Anesthesiology  Postprocedure Note    Patient: Aman Ley  MRN: [id number]  YOB: 1990  Date of evaluation: 12/6/2018  Time:  8:23 PM     Procedure Summary     Date:  12/06/18 Room / Location:  [facility] OR 01 / [facility] OR    Anesthesia Start:  1837 Anesthesia Stop:  1931    Procedure:  TONSILLECTOMY POSTOP HEMORRHAGE CONTROL (N/A ) Diagnosis:  (TONSIL BLEED)    Surgeon:  Kelli Hampton MD Responsible Provider:  Gerson Ireland MD    Anesthesia Type:  general ASA Status:  2 - Emergent          Anesthesia Type: No value filed. Adelaida Phase I: Adelaida Score: 9    Adelaida Phase II:      Last vitals: Reviewed and per EMR flowsheets.        Anesthesia Post Evaluation    Patient location during evaluation: PACU  Patient participation: complete - patient participated  Level of consciousness: awake and alert  Pain score: 2  Airway patency: patent  Nausea & Vomiting: no vomiting and no nausea  Complications: no  Cardiovascular status: hemodynamically stable  Respiratory status: spontaneous ventilation  Hydration status: stable

## 2018-12-08 VITALS
SYSTOLIC BLOOD PRESSURE: 128 MMHG | TEMPERATURE: 98.2 F | DIASTOLIC BLOOD PRESSURE: 79 MMHG | HEIGHT: 63 IN | RESPIRATION RATE: 15 BRPM | OXYGEN SATURATION: 97 % | WEIGHT: 165 LBS | BODY MASS INDEX: 29.23 KG/M2 | HEART RATE: 102 BPM

## 2018-12-08 PROCEDURE — 6360000002 HC RX W HCPCS: Performed by: OTOLARYNGOLOGY

## 2018-12-08 PROCEDURE — G0378 HOSPITAL OBSERVATION PER HR: HCPCS

## 2018-12-08 PROCEDURE — 96375 TX/PRO/DX INJ NEW DRUG ADDON: CPT

## 2018-12-08 PROCEDURE — 2580000003 HC RX 258: Performed by: STUDENT IN AN ORGANIZED HEALTH CARE EDUCATION/TRAINING PROGRAM

## 2018-12-08 PROCEDURE — 6370000000 HC RX 637 (ALT 250 FOR IP): Performed by: STUDENT IN AN ORGANIZED HEALTH CARE EDUCATION/TRAINING PROGRAM

## 2018-12-08 PROCEDURE — 96376 TX/PRO/DX INJ SAME DRUG ADON: CPT

## 2018-12-08 RX ORDER — SULFAMETHOXAZOLE AND TRIMETHOPRIM 400; 80 MG/1; MG/1
1 TABLET ORAL 2 TIMES DAILY
Qty: 14 TABLET | Refills: 0 | Status: SHIPPED | OUTPATIENT
Start: 2018-12-08 | End: 2018-12-15

## 2018-12-08 RX ORDER — ALBUTEROL SULFATE 90 UG/1
2 AEROSOL, METERED RESPIRATORY (INHALATION) EVERY 6 HOURS PRN
Status: DISCONTINUED | OUTPATIENT
Start: 2018-12-08 | End: 2018-12-08 | Stop reason: HOSPADM

## 2018-12-08 RX ORDER — DEXAMETHASONE SODIUM PHOSPHATE 4 MG/ML
8 INJECTION, SOLUTION INTRA-ARTICULAR; INTRALESIONAL; INTRAMUSCULAR; INTRAVENOUS; SOFT TISSUE ONCE
Status: COMPLETED | OUTPATIENT
Start: 2018-12-08 | End: 2018-12-08

## 2018-12-08 RX ADMIN — SODIUM CHLORIDE, POTASSIUM CHLORIDE, SODIUM LACTATE AND CALCIUM CHLORIDE: 600; 310; 30; 20 INJECTION, SOLUTION INTRAVENOUS at 06:02

## 2018-12-08 RX ADMIN — MORPHINE SULFATE 2 MG: 2 INJECTION, SOLUTION INTRAMUSCULAR; INTRAVENOUS at 02:12

## 2018-12-08 RX ADMIN — DEXAMETHASONE SODIUM PHOSPHATE 8 MG: 4 INJECTION, SOLUTION INTRAMUSCULAR; INTRAVENOUS at 08:48

## 2018-12-08 RX ADMIN — TRAMADOL HYDROCHLORIDE 100 MG: 50 TABLET, FILM COATED ORAL at 06:00

## 2018-12-08 RX ADMIN — ACETAMINOPHEN 650 MG: 325 TABLET ORAL at 08:48

## 2018-12-08 RX ADMIN — Medication 10 ML: at 08:49

## 2018-12-08 ASSESSMENT — PAIN DESCRIPTION - FREQUENCY: FREQUENCY: CONTINUOUS

## 2018-12-08 ASSESSMENT — PAIN DESCRIPTION - ONSET: ONSET: ON-GOING

## 2018-12-08 ASSESSMENT — PAIN DESCRIPTION - LOCATION
LOCATION: THROAT
LOCATION: NECK;THROAT;EAR
LOCATION: THROAT

## 2018-12-08 ASSESSMENT — PAIN SCALES - GENERAL
PAINLEVEL_OUTOF10: 5
PAINLEVEL_OUTOF10: 8
PAINLEVEL_OUTOF10: 4
PAINLEVEL_OUTOF10: 6

## 2018-12-08 ASSESSMENT — PAIN DESCRIPTION - DESCRIPTORS
DESCRIPTORS: SORE
DESCRIPTORS: SORE
DESCRIPTORS: ACHING;CONSTANT;DISCOMFORT;SORE

## 2018-12-08 ASSESSMENT — PAIN DESCRIPTION - ORIENTATION
ORIENTATION: LEFT;RIGHT
ORIENTATION: RIGHT
ORIENTATION: LEFT;RIGHT

## 2018-12-08 ASSESSMENT — PAIN DESCRIPTION - PAIN TYPE
TYPE: SURGICAL PAIN

## 2018-12-08 ASSESSMENT — PAIN DESCRIPTION - PROGRESSION: CLINICAL_PROGRESSION: NOT CHANGED

## 2018-12-17 ENCOUNTER — OFFICE VISIT (OUTPATIENT)
Dept: FAMILY MEDICINE CLINIC | Age: 28
End: 2018-12-17

## 2018-12-17 ENCOUNTER — HOSPITAL ENCOUNTER (EMERGENCY)
Age: 28
Discharge: HOME | End: 2018-12-17
Attending: EMERGENCY MEDICINE

## 2018-12-17 VITALS
DIASTOLIC BLOOD PRESSURE: 84 MMHG | RESPIRATION RATE: 18 BRPM | BODY MASS INDEX: 28.35 KG/M2 | TEMPERATURE: 98.6 F | HEART RATE: 107 BPM | OXYGEN SATURATION: 97 % | WEIGHT: 160 LBS | SYSTOLIC BLOOD PRESSURE: 136 MMHG | HEIGHT: 63 IN

## 2018-12-17 VITALS
SYSTOLIC BLOOD PRESSURE: 132 MMHG | HEIGHT: 63 IN | RESPIRATION RATE: 18 BRPM | OXYGEN SATURATION: 97 % | WEIGHT: 156 LBS | BODY MASS INDEX: 27.64 KG/M2 | HEART RATE: 94 BPM | DIASTOLIC BLOOD PRESSURE: 70 MMHG

## 2018-12-17 DIAGNOSIS — Z90.89 S/P TONSILLECTOMY: Primary | ICD-10-CM

## 2018-12-17 DIAGNOSIS — L50.9 URTICARIA: ICD-10-CM

## 2018-12-17 PROBLEM — G58.8 INTERCOSTAL NEURALGIA: Status: RESOLVED | Noted: 2017-07-21 | Resolved: 2018-12-17

## 2018-12-17 PROBLEM — J01.00 ACUTE NON-RECURRENT MAXILLARY SINUSITIS: Status: RESOLVED | Noted: 2017-03-20 | Resolved: 2018-12-17

## 2018-12-17 PROBLEM — R10.30 LOWER ABDOMINAL PAIN: Status: RESOLVED | Noted: 2018-11-12 | Resolved: 2018-12-17

## 2018-12-17 PROBLEM — R49.0 HOARSENESS: Status: RESOLVED | Noted: 2018-11-12 | Resolved: 2018-12-17

## 2018-12-17 PROBLEM — H92.01 RIGHT EAR PAIN: Status: RESOLVED | Noted: 2017-06-09 | Resolved: 2018-12-17

## 2018-12-17 PROBLEM — K62.5 RECTAL BLEED: Status: RESOLVED | Noted: 2018-11-12 | Resolved: 2018-12-17

## 2018-12-17 LAB
BASOPHILS ABSOLUTE: 0.05 E9/L (ref 0–0.2)
BASOPHILS RELATIVE PERCENT: 0.5 % (ref 0–2)
EOSINOPHILS ABSOLUTE: 0.08 E9/L (ref 0.05–0.5)
EOSINOPHILS RELATIVE PERCENT: 0.8 % (ref 0–6)
HCT VFR BLD CALC: 40.7 % (ref 34–48)
HEMOGLOBIN: 13.7 G/DL (ref 11.5–15.5)
IMMATURE GRANULOCYTES #: 0.04 E9/L
IMMATURE GRANULOCYTES %: 0.4 % (ref 0–5)
LYMPHOCYTES ABSOLUTE: 1.3 E9/L (ref 1.5–4)
LYMPHOCYTES RELATIVE PERCENT: 13.2 % (ref 20–42)
MCH RBC QN AUTO: 28.6 PG (ref 26–35)
MCHC RBC AUTO-ENTMCNC: 33.7 % (ref 32–34.5)
MCV RBC AUTO: 85 FL (ref 80–99.9)
MONOCYTES ABSOLUTE: 0.81 E9/L (ref 0.1–0.95)
MONOCYTES RELATIVE PERCENT: 8.2 % (ref 2–12)
NEUTROPHILS ABSOLUTE: 7.55 E9/L (ref 1.8–7.3)
NEUTROPHILS RELATIVE PERCENT: 76.9 % (ref 43–80)
PDW BLD-RTO: 11.9 FL (ref 11.5–15)
PLATELET # BLD: 250 E9/L (ref 130–450)
PMV BLD AUTO: 10.3 FL (ref 7–12)
RBC # BLD: 4.79 E12/L (ref 3.5–5.5)
WBC # BLD: 9.8 E9/L (ref 4.5–11.5)

## 2018-12-17 PROCEDURE — 85025 COMPLETE CBC W/AUTO DIFF WBC: CPT

## 2018-12-17 PROCEDURE — 99283 EMERGENCY DEPT VISIT LOW MDM: CPT

## 2018-12-17 PROCEDURE — 99213 OFFICE O/P EST LOW 20 MIN: CPT | Performed by: FAMILY MEDICINE

## 2018-12-17 RX ORDER — LEVOTHYROXINE SODIUM 0.1 MG/1
TABLET ORAL
Qty: 90 TABLET | Refills: 3 | Status: SHIPPED | OUTPATIENT
Start: 2018-12-17 | End: 2019-03-07 | Stop reason: SDUPTHER

## 2018-12-17 RX ORDER — FLUCONAZOLE 100 MG/1
100 TABLET ORAL DAILY
COMMUNITY
End: 2019-05-09

## 2018-12-17 ASSESSMENT — ENCOUNTER SYMPTOMS
EYE PAIN: 0
NAUSEA: 0
BLOOD IN STOOL: 0
COLOR CHANGE: 0
DIARRHEA: 0
VOMITING: 0
WHEEZING: 0
BACK PAIN: 0
DIARRHEA: 0
SORE THROAT: 1
CONSTIPATION: 0
EYE ITCHING: 0
ABDOMINAL PAIN: 0
EYE REDNESS: 0
NAUSEA: 0
BLOOD IN STOOL: 0
BACK PAIN: 0
EYE DISCHARGE: 0
SORE THROAT: 0
ABDOMINAL DISTENTION: 0
SHORTNESS OF BREATH: 0
SWOLLEN GLANDS: 1
CHEST TIGHTNESS: 0
EYE REDNESS: 0
SINUS PRESSURE: 0
APNEA: 0
RHINORRHEA: 0
ABDOMINAL PAIN: 0
EYE PAIN: 0
RHINORRHEA: 0
SHORTNESS OF BREATH: 0
COUGH: 0
WHEEZING: 0
COUGH: 1
VOMITING: 0
SINUS PRESSURE: 0

## 2018-12-17 ASSESSMENT — PATIENT HEALTH QUESTIONNAIRE - PHQ9
1. LITTLE INTEREST OR PLEASURE IN DOING THINGS: 0
2. FEELING DOWN, DEPRESSED OR HOPELESS: 0
SUM OF ALL RESPONSES TO PHQ QUESTIONS 1-9: 0
SUM OF ALL RESPONSES TO PHQ QUESTIONS 1-9: 0
SUM OF ALL RESPONSES TO PHQ9 QUESTIONS 1 & 2: 0

## 2018-12-17 NOTE — ED PROVIDER NOTES
Patient is a 42-year-old female here with concern of bleeding after her tonsillectomy. Patient states that she had her initial surgery of tonsillectomy on 11/29/18. She states that she had revised surgery for cauterization from her tonsillectomy site on 12/6/18 due to bleeding. Patient states that earlier today around 2 AM, she had small amount of blood from left side of the back of her mouth. She states that she used cold water after which the bleeding stopped. She states that she feels fine but wanted to get double checked. She states that she is seeing her ENT at 10 AM today for follow-up appointment. She denies lightheadedness, falls, injuries, nausea/vomiting, diarrhea, blood in stool or urine, chest pain/pressure, shortness of breath, abdominal pain, fever, chills, sweats, or urinary problems. Patient states her last menstrual period was about a month ago. Patient states that she has had intermittent nonproductive cough for the past few days. Illness    The current episode started today. Associated symptoms include cough. Pertinent negatives include no fever, no abdominal pain, no diarrhea, no nausea, no vomiting, no ear pain, no headaches, no hearing loss, no rhinorrhea, no sore throat, no neck pain, no neck stiffness, no wheezing, no rash, no eye discharge, no eye pain and no eye redness. She has been eating and drinking normally. Urine output has been normal.       Review of Systems   Constitutional: Negative for chills, diaphoresis and fever. HENT: Negative for ear pain, hearing loss, rhinorrhea, sinus pressure and sore throat. Eyes: Negative for pain, discharge, redness and visual disturbance. Respiratory: Positive for cough. Negative for shortness of breath and wheezing. Cardiovascular: Negative for chest pain. Gastrointestinal: Negative for abdominal distention, abdominal pain, blood in stool, diarrhea, nausea and vomiting.    Genitourinary: Negative for dysuria, flank pain, Environmental allergies; IBS (irritable bowel syndrome); Mitral valve prolapse; Palpitations; Sore throat; and Thyroid disease. Past Surgical History:  has a past surgical history that includes Dental surgery; Colonoscopy (18);  section; pr removal of tonsils,14+ y/o (N/A, 2018); pr biopsy oropharynx (Left, 2018); and Tonsillectomy (N/A, 2018). Social History:  reports that she has never smoked. She has never used smokeless tobacco. She reports that she drinks alcohol. She reports that she does not use drugs. Family History: family history includes Cancer (age of onset: 46) in her paternal aunt; Heart Disease in her father; High Blood Pressure in her mother; Hypertension in her father and mother; Kidney Disease in her father; Other in her mother and sister. The patients home medications have been reviewed.     Allergies: Cefdinir and Lomedia 24 fe [norethin ace-eth estrad-fe]    -------------------------------------------------- RESULTS -------------------------------------------------  Labs:  Results for orders placed or performed during the hospital encounter of 18   CBC Auto Differential   Result Value Ref Range    WBC 9.8 4.5 - 11.5 E9/L    RBC 4.79 3.50 - 5.50 E12/L    Hemoglobin 13.7 11.5 - 15.5 g/dL    Hematocrit 40.7 34.0 - 48.0 %    MCV 85.0 80.0 - 99.9 fL    MCH 28.6 26.0 - 35.0 pg    MCHC 33.7 32.0 - 34.5 %    RDW 11.9 11.5 - 15.0 fL    Platelets 756 682 - 312 E9/L    MPV 10.3 7.0 - 12.0 fL    Neutrophils % 76.9 43.0 - 80.0 %    Immature Granulocytes % 0.4 0.0 - 5.0 %    Lymphocytes % 13.2 (L) 20.0 - 42.0 %    Monocytes % 8.2 2.0 - 12.0 %    Eosinophils % 0.8 0.0 - 6.0 %    Basophils % 0.5 0.0 - 2.0 %    Neutrophils # 7.55 (H) 1.80 - 7.30 E9/L    Immature Granulocytes # 0.04 E9/L    Lymphocytes # 1.30 (L) 1.50 - 4.00 E9/L    Monocytes # 0.81 0.10 - 0.95 E9/L    Eosinophils # 0.08 0.05 - 0.50 E9/L    Basophils # 0.05 0.00 - 0.20 E9/L       Radiology:  No

## 2018-12-17 NOTE — ED NOTES
Assumed care of pt, pt resting comfortably at this time, resps easy and unlabored with no distress noted, iv access obtained, labs obtained and sent     Vernon Hickey  12/17/18 9918

## 2018-12-17 NOTE — PROGRESS NOTES
frequent    Thyroid disease        Past Surgical History:   Procedure Laterality Date     SECTION      2 children     COLONOSCOPY  16    DENTAL SURGERY      extractions    MA BIOPSY OROPHARYNX Left 2018    LEFT TONGUE  BIOPSY performed by Cruz Dowling MD at [place] [address] MA REMOVAL OF TONSILS,12+ Y/O N/A 2018    TONSILLECTOMY performed by [facility]ling MD at [facility] N/A 2018    TONSILLECTOMY POSTOP HEMORRHAGE CONTROL performed by [facility]ling MD at [facility] OR       Current Outpatient Prescriptions   Medication Sig Dispense Refill    fluconazole (DIFLUCAN) 100 MG tablet Take 100 mg by mouth daily      levothyroxine (SYNTHROID) 100 MCG tablet TAKE ONE TABLET BY MOUTH ONE TIME A DAY 90 tablet 3    albuterol sulfate HFA (PROAIR HFA) 108 (90 Base) MCG/ACT inhaler Inhale 2 puffs into the lungs every 6 hours as needed for Wheezing 1 Inhaler 3    ondansetron (ZOFRAN-ODT) 4 MG disintegrating tablet Take 1 tablet by mouth 3 times daily as needed for Nausea or Vomiting 21 tablet 0    loratadine (CLARITIN) 10 MG tablet Take 10 mg by mouth daily       No current facility-administered medications for this visit.         Allergies   Allergen Reactions    Cefdinir Hives     Had reaction as a child    Lomedia 24 Fe [Norethin Ace-Eth Estrad-Fe] Hives       Social History     Social History    Marital status:      Spouse name: N/A    Number of children: N/A    Years of education: N/A     Occupational History     [employer]     Social History Main Topics    Smoking status: Never Smoker    Smokeless tobacco: Never Used    Alcohol use Yes      Comment: wine once in a while    Drug use: No    Sexual activity: Not Asked     Other Topics Concern    None     Social History Narrative    None       Family History   Problem Relation Age of Onset    Hypertension Mother         pulmonary hypertension     High Blood Pressure

## 2018-12-17 NOTE — ED NOTES
Patient stated that while standing that she felt weak.  No dizziness      Burnis [place], [place]  72/56/09 0070

## 2019-01-04 ENCOUNTER — HOSPITAL ENCOUNTER (OUTPATIENT)
Age: 29
Discharge: HOME | End: 2019-01-04
Payer: COMMERCIAL

## 2019-01-04 DIAGNOSIS — L50.9: Primary | ICD-10-CM

## 2019-01-04 LAB
ABSOLUTE BASO #: 0 10*3/UL (ref 0–0.1)
ABSOLUTE EOS #: 0.2 10*3/UL (ref 0–0.4)
ABSOLUTE NEUT #: 2.8 10*3/UL (ref 2.3–7.9)
BASOPHILS # BLD AUTO: 0 10*3/UL (ref 0–0.1)
BASOPHILS %: 0.8 % (ref 0–1)
BASOPHILS NFR BLD AUTO: 0.8 % (ref 0–1)
EOSINOPHIL # BLD AUTO: 0.2 10*3/UL (ref 0–0.4)
EOSINOPHIL # BLD AUTO: 3.4 % (ref 1–4)
EOSINOPHILS %: 3.4 % (ref 1–4)
ERYTHROCYTE [DISTWIDTH] IN BLOOD BY AUTOMATED COUNT: 12.7 % (ref 0–14.5)
HCT VFR BLD AUTO: 37.3 % (ref 37–47)
HCT VFR BLD CALC: 37.3 % (ref 37–47)
HEMOGLOBIN: 12 G/DL (ref 12–16)
HGB BLD-MCNC: 12 G/DL (ref 12–16)
IMMATURE GRANULOCYTES #: 0 10*3/UL (ref 0–0.1)
IMMATURE GRANULOCYTES: 0.2 % (ref 0–1)
LYMPHOCYTE %: 26.6 % (ref 27–41)
LYMPHOCYTES # BLD AUTO: 1.3 10*3/UL (ref 1.3–4.4)
LYMPHOCYTES # BLD: 1.3 10*3/UL (ref 1.3–4.4)
LYMPHOCYTES NFR BLD AUTO: 26.6 % (ref 27–41)
MCH RBC QN AUTO: 28.5 PG (ref 27–31)
MCH RBC QN AUTO: 28.5 PG (ref 27–31)
MCHC RBC AUTO-ENTMCNC: 32.2 G/DL (ref 33–37)
MCHC RBC AUTO-ENTMCNC: 32.2 G/DL (ref 33–37)
MCV RBC AUTO: 88.6 FL (ref 81–99)
MCV RBC AUTO: 88.6 FL (ref 81–99)
MONOCYTES # BLD AUTO: 0.6 10*3/UL (ref 0.1–1)
MONOCYTES # BLD: 0.6 10*3/UL (ref 0.1–1)
MONOCYTES %: 11.4 % (ref 3–9)
MONOCYTES NFR BLD MANUAL: 11.4 % (ref 3–9)
NEUT #: 2.8 10*3/UL (ref 2.3–7.9)
NEUT %: 57.6 % (ref 47–73)
NEUTROPHILS %: 57.6 % (ref 47–73)
NRBC BLD QL AUTO: 0 10*3/UL (ref 0–0)
NUCLEATED RED BLOOD CELLS: 0 % (ref 0–0)
PDW BLD-RTO: 12.7 % (ref 0–14.5)
PLATELET # BLD AUTO: 202 10*3/UL (ref 130–400)
PLATELET # BLD: 202 10*3/UL (ref 130–400)
PMV BLD AUTO: 10.2 FL (ref 9.6–12.3)
PMV BLD AUTO: 10.2 FL (ref 9.6–12.3)
RBC # BLD AUTO: 4.21 10*6/UL (ref 4.1–5.1)
RBC # BLD: 4.21 10*6/UL (ref 4.1–5.1)
WBC # BLD: 4.9 10*3/UL (ref 4.8–10.8)
WBC NRBC COR # BLD AUTO: 4.9 10*3/UL (ref 4.8–10.8)

## 2019-01-21 ENCOUNTER — OFFICE VISIT (OUTPATIENT)
Dept: FAMILY MEDICINE CLINIC | Age: 29
End: 2019-01-21
Payer: COMMERCIAL

## 2019-01-21 ENCOUNTER — HOSPITAL ENCOUNTER (OUTPATIENT)
Age: 29
Discharge: HOME | End: 2019-01-23
Payer: COMMERCIAL

## 2019-01-21 VITALS
HEIGHT: 63 IN | BODY MASS INDEX: 28.17 KG/M2 | DIASTOLIC BLOOD PRESSURE: 70 MMHG | HEART RATE: 82 BPM | OXYGEN SATURATION: 98 % | SYSTOLIC BLOOD PRESSURE: 114 MMHG | RESPIRATION RATE: 16 BRPM | WEIGHT: 159 LBS

## 2019-01-21 DIAGNOSIS — M79.10 MYALGIA: ICD-10-CM

## 2019-01-21 DIAGNOSIS — F41.9 ANXIETY: ICD-10-CM

## 2019-01-21 DIAGNOSIS — R59.1 LYMPHADENOPATHY: ICD-10-CM

## 2019-01-21 DIAGNOSIS — E03.9 ACQUIRED HYPOTHYROIDISM: Primary | ICD-10-CM

## 2019-01-21 DIAGNOSIS — R53.83 FATIGUE, UNSPECIFIED TYPE: ICD-10-CM

## 2019-01-21 PROBLEM — L50.9 URTICARIA: Status: RESOLVED | Noted: 2018-12-17 | Resolved: 2019-01-21

## 2019-01-21 PROBLEM — E04.9 THYROID GOITER: Status: RESOLVED | Noted: 2018-11-12 | Resolved: 2019-01-21

## 2019-01-21 PROBLEM — R42 VERTIGO: Status: RESOLVED | Noted: 2018-09-17 | Resolved: 2019-01-21

## 2019-01-21 LAB
ALBUMIN SERPL-MCNC: 4.7 G/DL (ref 3.5–5.2)
ALP BLD-CCNC: 73 U/L (ref 35–104)
ALT SERPL-CCNC: 12 U/L (ref 0–32)
ANION GAP SERPL CALCULATED.3IONS-SCNC: 15 MMOL/L (ref 7–16)
AST SERPL-CCNC: 14 U/L (ref 0–31)
BASOPHILS ABSOLUTE: 0.07 E9/L (ref 0–0.2)
BASOPHILS RELATIVE PERCENT: 1.4 % (ref 0–2)
BILIRUB SERPL-MCNC: 0.3 MG/DL (ref 0–1.2)
BUN BLDV-MCNC: 10 MG/DL (ref 6–20)
CALCIUM SERPL-MCNC: 9.1 MG/DL (ref 8.6–10.2)
CHLORIDE BLD-SCNC: 100 MMOL/L (ref 98–107)
CO2: 25 MMOL/L (ref 22–29)
CREAT SERPL-MCNC: 0.6 MG/DL (ref 0.5–1)
EOSINOPHILS ABSOLUTE: 0.1 E9/L (ref 0.05–0.5)
EOSINOPHILS RELATIVE PERCENT: 2 % (ref 0–6)
GFR AFRICAN AMERICAN: >60
GFR NON-AFRICAN AMERICAN: >60 ML/MIN/1.73
GLUCOSE BLD-MCNC: 89 MG/DL (ref 74–99)
HCT VFR BLD CALC: 40.7 % (ref 34–48)
HEMOGLOBIN: 13.3 G/DL (ref 11.5–15.5)
IMMATURE GRANULOCYTES #: 0.06 E9/L
IMMATURE GRANULOCYTES %: 1.2 % (ref 0–5)
LYMPHOCYTES ABSOLUTE: 1.23 E9/L (ref 1.5–4)
LYMPHOCYTES RELATIVE PERCENT: 25.1 % (ref 20–42)
MCH RBC QN AUTO: 28.9 PG (ref 26–35)
MCHC RBC AUTO-ENTMCNC: 32.7 % (ref 32–34.5)
MCV RBC AUTO: 88.3 FL (ref 80–99.9)
MONOCYTES ABSOLUTE: 0.41 E9/L (ref 0.1–0.95)
MONOCYTES RELATIVE PERCENT: 8.4 % (ref 2–12)
NEUTROPHILS ABSOLUTE: 3.03 E9/L (ref 1.8–7.3)
NEUTROPHILS RELATIVE PERCENT: 61.9 % (ref 43–80)
PDW BLD-RTO: 12.2 FL (ref 11.5–15)
PLATELET # BLD: 178 E9/L (ref 130–450)
PMV BLD AUTO: 12 FL (ref 7–12)
POTASSIUM SERPL-SCNC: 4 MMOL/L (ref 3.5–5)
RBC # BLD: 4.61 E12/L (ref 3.5–5.5)
RHEUMATOID FACTOR: <10 IU/ML (ref 0–13)
SEDIMENTATION RATE, ERYTHROCYTE: 20 MM/HR (ref 0–20)
SODIUM BLD-SCNC: 140 MMOL/L (ref 132–146)
TOTAL PROTEIN: 7.9 G/DL (ref 6.4–8.3)
WBC # BLD: 4.9 E9/L (ref 4.5–11.5)

## 2019-01-21 PROCEDURE — 99214 OFFICE O/P EST MOD 30 MIN: CPT | Performed by: FAMILY MEDICINE

## 2019-01-21 PROCEDURE — 80053 COMPREHEN METABOLIC PANEL: CPT

## 2019-01-21 PROCEDURE — 85025 COMPLETE CBC W/AUTO DIFF WBC: CPT

## 2019-01-21 PROCEDURE — 85651 RBC SED RATE NONAUTOMATED: CPT

## 2019-01-21 PROCEDURE — 86038 ANTINUCLEAR ANTIBODIES: CPT

## 2019-01-21 PROCEDURE — 86431 RHEUMATOID FACTOR QUANT: CPT

## 2019-01-21 RX ORDER — CLONAZEPAM 0.5 MG/1
0.5 TABLET ORAL 2 TIMES DAILY PRN
Qty: 30 TABLET | Refills: 0 | Status: SHIPPED | OUTPATIENT
Start: 2019-01-21 | End: 2019-03-15 | Stop reason: SDUPTHER

## 2019-01-21 RX ORDER — CITALOPRAM 10 MG/1
10 TABLET ORAL DAILY
Qty: 30 TABLET | Refills: 3 | Status: SHIPPED | OUTPATIENT
Start: 2019-01-21 | End: 2019-03-07 | Stop reason: SDUPTHER

## 2019-01-21 ASSESSMENT — ENCOUNTER SYMPTOMS
CONSTIPATION: 0
DIARRHEA: 0
WHEEZING: 0
BACK PAIN: 0
EYE ITCHING: 0
SORE THROAT: 0
NAUSEA: 0
ABDOMINAL PAIN: 0
SWOLLEN GLANDS: 0
EYE PAIN: 0
RHINORRHEA: 0
APNEA: 0
VISUAL CHANGE: 0
VOMITING: 0
BLOOD IN STOOL: 0
COLOR CHANGE: 0
SHORTNESS OF BREATH: 0
CHANGE IN BOWEL HABIT: 0
COUGH: 0
EYE REDNESS: 0
CHEST TIGHTNESS: 0
SINUS PRESSURE: 0

## 2019-01-21 ASSESSMENT — PATIENT HEALTH QUESTIONNAIRE - PHQ9
1. LITTLE INTEREST OR PLEASURE IN DOING THINGS: 0
SUM OF ALL RESPONSES TO PHQ QUESTIONS 1-9: 0
2. FEELING DOWN, DEPRESSED OR HOPELESS: 0
SUM OF ALL RESPONSES TO PHQ QUESTIONS 1-9: 0
SUM OF ALL RESPONSES TO PHQ9 QUESTIONS 1 & 2: 0

## 2019-01-22 LAB — ANTI-NUCLEAR ANTIBODY (ANA): NEGATIVE

## 2019-01-24 ENCOUNTER — HOSPITAL ENCOUNTER (OUTPATIENT)
Dept: CT IMAGING | Age: 29
Discharge: HOME | End: 2019-01-26
Payer: COMMERCIAL

## 2019-01-24 DIAGNOSIS — R59.1 LYMPHADENOPATHY: ICD-10-CM

## 2019-01-24 PROCEDURE — 70492 CT SFT TSUE NCK W/O & W/DYE: CPT

## 2019-01-24 PROCEDURE — 6360000004 HC RX CONTRAST MEDICATION: Performed by: RADIOLOGY

## 2019-01-24 RX ADMIN — IOPAMIDOL 80 ML: 755 INJECTION, SOLUTION INTRAVENOUS at 19:07

## 2019-01-25 ENCOUNTER — TELEPHONE (OUTPATIENT)
Dept: FAMILY MEDICINE CLINIC | Age: 29
End: 2019-01-25

## 2019-01-25 DIAGNOSIS — Q89.2 ACCESSORY THYMIC TISSUE: ICD-10-CM

## 2019-01-25 DIAGNOSIS — R59.1 LYMPHADENOPATHY: Primary | ICD-10-CM

## 2019-01-28 ENCOUNTER — TELEPHONE (OUTPATIENT)
Dept: FAMILY MEDICINE CLINIC | Age: 29
End: 2019-01-28

## 2019-01-31 ENCOUNTER — HOSPITAL ENCOUNTER (OUTPATIENT)
Dept: CT IMAGING | Age: 29
Discharge: HOME | End: 2019-02-02
Payer: COMMERCIAL

## 2019-01-31 DIAGNOSIS — R59.1 LYMPHADENOPATHY: ICD-10-CM

## 2019-01-31 DIAGNOSIS — Q89.2 ACCESSORY THYMIC TISSUE: ICD-10-CM

## 2019-01-31 PROCEDURE — 71270 CT THORAX DX C-/C+: CPT

## 2019-01-31 PROCEDURE — 6360000004 HC RX CONTRAST MEDICATION: Performed by: RADIOLOGY

## 2019-01-31 RX ADMIN — IOPAMIDOL 80 ML: 755 INJECTION, SOLUTION INTRAVENOUS at 19:30

## 2019-03-07 RX ORDER — LEVOTHYROXINE SODIUM 0.1 MG/1
TABLET ORAL
Qty: 90 TABLET | Refills: 3 | Status: SHIPPED | OUTPATIENT
Start: 2019-03-07 | End: 2019-04-17 | Stop reason: SDUPTHER

## 2019-03-07 RX ORDER — CITALOPRAM 10 MG/1
10 TABLET ORAL DAILY
Qty: 30 TABLET | Refills: 3 | Status: SHIPPED | OUTPATIENT
Start: 2019-03-07 | End: 2019-04-08 | Stop reason: SDUPTHER

## 2019-03-15 ENCOUNTER — OFFICE VISIT (OUTPATIENT)
Dept: FAMILY MEDICINE CLINIC | Age: 29
End: 2019-03-15
Payer: COMMERCIAL

## 2019-03-15 VITALS
OXYGEN SATURATION: 97 % | WEIGHT: 159 LBS | RESPIRATION RATE: 18 BRPM | HEIGHT: 63 IN | BODY MASS INDEX: 28.17 KG/M2 | HEART RATE: 72 BPM | DIASTOLIC BLOOD PRESSURE: 84 MMHG | SYSTOLIC BLOOD PRESSURE: 120 MMHG | TEMPERATURE: 98.2 F

## 2019-03-15 DIAGNOSIS — R53.83 FATIGUE, UNSPECIFIED TYPE: ICD-10-CM

## 2019-03-15 DIAGNOSIS — E03.9 ACQUIRED HYPOTHYROIDISM: ICD-10-CM

## 2019-03-15 DIAGNOSIS — F41.9 ANXIETY: ICD-10-CM

## 2019-03-15 DIAGNOSIS — K21.9 GASTROESOPHAGEAL REFLUX DISEASE WITHOUT ESOPHAGITIS: Primary | ICD-10-CM

## 2019-03-15 PROBLEM — R59.1 LYMPHADENOPATHY: Status: RESOLVED | Noted: 2019-01-21 | Resolved: 2019-03-15

## 2019-03-15 PROCEDURE — 99214 OFFICE O/P EST MOD 30 MIN: CPT | Performed by: FAMILY MEDICINE

## 2019-03-15 RX ORDER — CLONAZEPAM 0.5 MG/1
0.5 TABLET ORAL 2 TIMES DAILY PRN
Qty: 30 TABLET | Refills: 0 | Status: SHIPPED | OUTPATIENT
Start: 2019-03-15 | End: 2019-06-28 | Stop reason: SDUPTHER

## 2019-03-15 ASSESSMENT — ENCOUNTER SYMPTOMS
BLOOD IN STOOL: 0
SINUS PRESSURE: 0
BLOATING: 1
DIARRHEA: 1
VOMITING: 0
RHINORRHEA: 0
SHORTNESS OF BREATH: 0
EYE PAIN: 0
HEMATOCHEZIA: 1
NAUSEA: 0
CHANGE IN BOWEL HABIT: 0
VISUAL CHANGE: 0
HEARTBURN: 1
COLOR CHANGE: 0
CHEST TIGHTNESS: 0
APNEA: 0
SORE THROAT: 0
CONSTIPATION: 0
WHEEZING: 0
BACK PAIN: 1
EYE ITCHING: 0
COUGH: 0
EYE REDNESS: 0
SWOLLEN GLANDS: 0
ABDOMINAL PAIN: 0

## 2019-03-15 ASSESSMENT — PATIENT HEALTH QUESTIONNAIRE - PHQ9
2. FEELING DOWN, DEPRESSED OR HOPELESS: 0
1. LITTLE INTEREST OR PLEASURE IN DOING THINGS: 0
SUM OF ALL RESPONSES TO PHQ QUESTIONS 1-9: 0
SUM OF ALL RESPONSES TO PHQ9 QUESTIONS 1 & 2: 0
SUM OF ALL RESPONSES TO PHQ QUESTIONS 1-9: 0

## 2019-04-01 ENCOUNTER — TELEPHONE (OUTPATIENT)
Dept: FAMILY MEDICINE CLINIC | Age: 29
End: 2019-04-01

## 2019-04-01 RX ORDER — DOXYCYCLINE HYCLATE 100 MG
100 TABLET ORAL 2 TIMES DAILY
Qty: 20 TABLET | Refills: 0 | Status: SHIPPED | OUTPATIENT
Start: 2019-04-01 | End: 2019-04-11

## 2019-04-01 RX ORDER — PREDNISONE 10 MG/1
TABLET ORAL
Qty: 18 TABLET | Refills: 0 | Status: SHIPPED | OUTPATIENT
Start: 2019-04-01 | End: 2019-05-09

## 2019-04-01 RX ORDER — FLUCONAZOLE 150 MG/1
150 TABLET ORAL ONCE
Qty: 1 TABLET | Refills: 0 | Status: SHIPPED | OUTPATIENT
Start: 2019-04-01 | End: 2019-04-01

## 2019-04-08 RX ORDER — CITALOPRAM 10 MG/1
10 TABLET ORAL DAILY
Qty: 90 TABLET | Refills: 1 | Status: SHIPPED | OUTPATIENT
Start: 2019-04-08 | End: 2019-05-09 | Stop reason: SDUPTHER

## 2019-04-17 RX ORDER — LEVOTHYROXINE SODIUM 0.1 MG/1
TABLET ORAL
Qty: 90 TABLET | Refills: 3 | Status: SHIPPED | OUTPATIENT
Start: 2019-04-17 | End: 2019-05-09

## 2019-04-22 ASSESSMENT — ENCOUNTER SYMPTOMS
VOICE CHANGE: 0
RHINORRHEA: 0
EYE DISCHARGE: 0
CONSTIPATION: 0
BLOOD IN STOOL: 0
WHEEZING: 0
COUGH: 0
VOMITING: 0
CHOKING: 0
SHORTNESS OF BREATH: 0
NAUSEA: 0
SINUS PAIN: 0
ABDOMINAL PAIN: 0
SORE THROAT: 0
ABDOMINAL DISTENTION: 0
EYE ITCHING: 0
DIARRHEA: 0
CHEST TIGHTNESS: 0
BACK PAIN: 0
TROUBLE SWALLOWING: 0
SINUS PRESSURE: 0

## 2019-04-22 NOTE — PROGRESS NOTES
most consistent with a fibroadenoma.       It contains a clip which correlates with history of prior biopsy.       No sonographic evidence of suspicious solid or cystic mass lesions.  No focal areas of suspicious atypical echogenicity.       IMPRESSION:   Solid mass in the left breast is benign.       Screening mammogram at age 38 is recommended.       =======================================   BI-RADS Category 2:  Benign   =======================================        Right Ultrasound       HISTORY:   The patient has no personal history of cancer. The patient has the following family history of breast cancer:  paternal aunt, at age 48. 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       ULTRASOUND TECHNIQUE:   High-resolution real-time ultrasound scanning was performed. Additional elastography and doppler color flow analysis of any finding was also obtained.       COMPARISON:   No prior imaging studies are available for comparison.       ULTRASOUND FINDINGS:           Finding 1:   Sonography was performed in the right breast using a radial and anti-radial approach. No sonographic evidence of suspicious solid or cystic mass lesions. No focal areas of suspicious atypical echogenicity.       IMPRESSION:   There is no sonographic evidence of malignancy.       Screening mammogram at age 38 is recommended.       =======================================   BI-RADS Category 1:  Negative             9/24/18, Left breast ultrasound, [facility]:  ULTRASOUND FINDINGS:   Finding 1:   Sonography was performed in the left breast using a radial and anti-radial approach. There is a stable oval well circumscribed solid mass measuring 18 x 8 x 11 mm seen in the left breast at 8 o'clock located 5 centimeters from the nipple.  Internal    echogenicity is hypoechoic.       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       No sonographic evidence of suspicious solid or cystic mass lesions.  No focal areas of suspicious atypical echogenicity.       IMPRESSION:   Stable solid mass in the left breast is benign.       Screening mammogram at age 38 is recommended.       =======================================   BI-RADS Category 2:  Benign       9/24/18, Right breast ultrasound, [facility]:     ULTRASOUND FINDINGS:   US with color only.           Finding 1:   Sonography was performed in the right breast using a radial and anti-radial approach. No sonographic evidence of suspicious solid or cystic mass lesions. No focal areas of suspicious atypical echogenicity.       IMPRESSION:   There is no sonographic evidence of malignancy.       Screening mammogram at age 38 is recommended.       =======================================   BI-RADS Category 1:  Negative       1/24/19 - CT Soft Tissue Neck [facility]:     FINDINGS:   The parotid and submandibular glands appear normal in size and   attenuation with no focal lesions seen. The oropharynx and nasopharynx   appear normal. There is no evidence of tonsillar swelling. The   parapharyngeal spaces are intact. The epiglottis and aryepiglottic   folds appear normal. The thyroid gland is normal in size and enhances   homogeneously. The airway is patent. No thickening or nodularity is   seen in the region of the vocal cords.       There are scattered small bilateral cervical lymph nodes. There is a   digastric node on the right that measures 11 mm and a digastric node   on the left that measures 15 mm. There is a second node in the the   submandibular region between the submandibular gland and the left   sternocleidomastoid muscle measuring 10 mm. On the left there is a   similar node measuring 8 mm.  All other nodes within the neck measure   less than 1 cm in size.        In the anterior mediastinum/retrosternal area anterior and superior   to the aortic arch there is an ill-defined soft tissue density that   measures 2.7 x 1.8 cm which is most likely residual thymic tissue in   this young patient. . This does not appear to be vascular. No other   enlarged lymph nodes are seen in the visible mediastinum or hilar   areas but these are not optimally evaluated. The cervical vessels are   patent bilaterally. The subcutaneous soft tissue and musculature   appear normal. The visible lung apices are expanded and clear. No   supraclavicular lymphadenopathy is seen.       The osseous structures appear unremarkable. The visible portions of   the brain appear unremarkable. The paranasal sinuses and mastoid air   cells are clear.       CONCLUSION:   1. A few mildly prominent cervical nodes are seen in the anterior   upper neck bilaterally as described above. These are considered   nonspecific. These are probably reactive. In the anterior   mediastinum/posterior to the manubrium sternum there is a 2.7 x 1.8 cm   ill-defined soft tissue density which could represent residual thymic   tissue in this young patient. If there is palpable lymphadenopathy   elsewhere, follow-up CT of the chest, abdomen and pelvis is   recommended for further evaluation. 2. Otherwise negative CT of the neck.               1/31/19 - CT Chest [facility]:  FINDINGS:       Previously seen finding is seen at thoracic inlet posterior to the   manubrium on prior represents normal left brachiocephalic vein. No   evidence of mediastinal mass or evidence of lymphadenopathy. The heart   is normal in size. No airspace opacity or evidence of pleural   effusion. There is no evidence of suspicious lung nodule or evidence   of mass. No evidence of pneumothorax. No axillary lymphadenopathy. View of the upper abdomen shows normal bilateral adrenal glands.           Impression       1. Findings seen on prior examination of neck soft tissue from January 24, 2019 seen posterior to the manubrium represents normal left   brachiocephalic vein.       2. No evidence of mediastinal or hilar lymphadenopathy.       3.  Note made of a circumscribed, ovoid nodule seen in medial left   breast measures 17 x 9 mm possibly representing fibroadenoma. Correlation recommended. Ultrasound may be helpful for further   evaluation. Recent tonsillectomy, tongue biopsy; benign. Diagnosis:  A.  Bilateral tonsils, tonsillectomy: Tonsillar tissue with reactive  follicular lymphoid hyperplasia. Aggregates of bacteria morphologically compatible with Actinomyces  species are present within tonsillar crypts of one tonsil. B.  Left tongue, biopsy: Lingual tonsil tissue with reactive follicular  lymphoid hyperplasia. Complicated by re-bleed X2. Since son born 2 years ago having severe PMS, breast changes (R>L), acne, nipple sensitivity.        Past Medical History:   Diagnosis Date    Asthma     excersise induced and allergy induced    Enlarged lymph node     at left neck, to have a bx 2018    Environmental allergies     IBS (irritable bowel syndrome)     Mitral valve prolapse     no regurgitation, with anxiety or caffeine developes palpitations,followed with Dr. Juliana Rod, has appt with Dr. Dominique Buenrostro 2018    Palpitations 2014    caffeine induced    Sore throat     frequent    Thyroid disease      Past Surgical History:   Procedure Laterality Date     SECTION      2 children     COLONOSCOPY  16    DENTAL SURGERY      extractions    MN BIOPSY OROPHARYNX Left 2018    LEFT TONGUE  BIOPSY performed by Ernesto Martinez MD at [facility] 61 OF TONSILS,12+ Y/O N/A 2018    TONSILLECTOMY performed by Ernesto Martinez MD at [facility] 57 N/A 2018    TONSILLECTOMY POSTOP HEMORRHAGE CONTROL performed by Ernesto Martinez MD at [facility] [address] History     Socioeconomic History    Marital status:      Spouse name: Not on file    Number of children: Not on file    Years of education: Not on file    Highest education level: Not on file   Occupational History Employer: [employer]   Social Needs    Financial resource strain: Not on file    Food insecurity:     Worry: Not on file     Inability: Not on file    Transportation needs:     Medical: Not on file     Non-medical: Not on file   Tobacco Use    Smoking status: Never Smoker    Smokeless tobacco: Never Used   Substance and Sexual Activity    Alcohol use: Yes     Comment: wine once in a while    Drug use: No    Sexual activity: Not on file   Lifestyle    Physical activity:     Days per week: Not on file     Minutes per session: Not on file    Stress: Not on file   Relationships    Social connections:     Talks on phone: Not on file     Gets together: Not on file     Attends Jainism service: Not on file     Active member of club or organization: Not on file     Attends meetings of clubs or organizations: Not on file     Relationship status: Not on file    Intimate partner violence:     Fear of current or ex partner: Not on file     Emotionally abused: Not on file     Physically abused: Not on file     Forced sexual activity: Not on file   Other Topics Concern    Not on file   Social History Narrative    Not on file     Allergies   Allergen Reactions    Cefdinir Hives     Had reaction as a child    Lomedia 24 Fe [Norethin Ace-Eth Estrad-Fe] Hives     Current Outpatient Medications on File Prior to Visit   Medication Sig Dispense Refill    levothyroxine (SYNTHROID) 100 MCG tablet TAKE ONE TABLET BY MOUTH ONE TIME A DAY 90 tablet 3    citalopram (CELEXA) 10 MG tablet Take 1 tablet by mouth daily 90 tablet 1    predniSONE (DELTASONE) 10 MG tablet 30mg daily x3 days, then 20mg daily x3 days, then 10mg daily x3 days 18 tablet 0    clonazePAM (KLONOPIN) 0.5 MG tablet Take 1 tablet by mouth 2 times daily as needed for Anxiety for up to 30 days.  30 tablet 0    fluconazole (DIFLUCAN) 100 MG tablet Take 100 mg by mouth daily      albuterol sulfate HFA (PROAIR HFA) 108 (90 Base) oropharyngeal exudate. Eyes: Conjunctivae and EOM are normal. Right eye exhibits no discharge. Left eye exhibits no discharge. No scleral icterus. Neck: Normal range of motion. Neck supple. No JVD present. No tracheal deviation present. No thyromegaly present. Cardiovascular: Normal rate and regular rhythm. Exam reveals no gallop and no friction rub. No murmur heard. Pulmonary/Chest: Effort normal and breath sounds normal. No stridor. No respiratory distress. She has no wheezes. She has no rales. She exhibits no mass, no tenderness, no bony tenderness, no laceration, no edema, no deformity, no swelling and no retraction. Right breast exhibits no inverted nipple, no mass, no nipple discharge, no skin change and no tenderness. Left breast exhibits mass (barely palpable-more of a fullnes). Left breast exhibits no inverted nipple, no nipple discharge, no skin change and no tenderness. Breasts are symmetrical.   Breasts are with age appropriate density bilaterally. R>L No skin dimpling or puckering. No nipple discharge. No lumps nodules or masses appreciated on right. No axillary lymphadenopathy. Abdominal: Soft. She exhibits no distension. There is no tenderness. There is no rebound and no guarding. Musculoskeletal: Normal range of motion. She exhibits no edema, tenderness or deformity. Right shoulder: Normal.        Left shoulder: Normal.   Lymphadenopathy:     She has no cervical adenopathy. Right cervical: No superficial cervical, no deep cervical and no posterior cervical adenopathy present. Left cervical: No superficial cervical, no deep cervical and no posterior cervical adenopathy present. Right axillary: No pectoral and no lateral adenopathy present. Left axillary: No pectoral and no lateral adenopathy present. Neurological: She is alert and oriented to person, place, and time. Coordination normal.   Skin: Skin is warm and dry. No rash noted.  She is not diaphoretic. No erythema. No pallor. Psychiatric: She has a normal mood and affect. Her behavior is normal. Judgment and thought content normal.   Nursing note and vitals reviewed. Assessment:      Doris Anand is an extremely pleasant 34 y.o. female with FH of breast cancer in paternal aunt in her early 52's (currently late 52's). She has a history of US core biopsy proven fibroadenoma done at [facility] in 2011. She presents today with C/O of increasing discomfort within the left breast in area of mass, primarily with her cycle.         Bilateral breast US 03/14/2018 @ [facility]     Left Ultrasound       HISTORY:   The patient has no personal history of cancer. The patient has the following family history of breast cancer:  paternal aunt, at age 48. 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       ULTRASOUND TECHNIQUE:   High-resolution real-time ultrasound scanning was performed. Additional elastography and doppler color flow analysis of any finding was also obtained.       COMPARISON:   No prior imaging studies are available for comparison.       ULTRASOUND FINDINGS:           Finding 1:   Sonography was performed in the left breast using a radial and anti-radial approach. There is an oval solid mass with partially defined margins measuring 13 x 6 x 8 mm seen in the left breast at 8 o'clock located 3 centimeters from the nipple.  Internal    echogenicity is hypoechoic.       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       It contains a clip which correlates with history of prior biopsy.       No sonographic evidence of suspicious solid or cystic mass lesions.  No focal areas of suspicious atypical echogenicity.       IMPRESSION:   Solid mass in the left breast is benign.       Screening mammogram at age 38 is recommended.       =======================================   BI-RADS Category 2:  Benign   =======================================    Right Ultrasound   HISTORY:   The patient has no personal history of cancer. The patient has the following family history of breast cancer:  paternal aunt, at age 48. 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       ULTRASOUND TECHNIQUE:   High-resolution real-time ultrasound scanning was performed. Additional elastography and doppler color flow analysis of any finding was also obtained.       COMPARISON:   No prior imaging studies are available for comparison.       ULTRASOUND FINDINGS:   Finding 1:   Sonography was performed in the right breast using a radial and anti-radial approach. No sonographic evidence of suspicious solid or cystic mass lesions. No focal areas of suspicious atypical echogenicity.       IMPRESSION:   There is no sonographic evidence of malignancy.       Screening mammogram at age 38 is recommended.       =======================================   BI-RADS Category 1:  Negative        9/24/18, Left breast ultrasound, [facility]:     ULTRASOUND FINDINGS:           Finding 1:   Sonography was performed in the left breast using a radial and anti-radial approach. There is a stable oval well circumscribed solid mass measuring 18 x 8 x 11 mm seen in the left breast at 8 o'clock located 5 centimeters from the nipple. Internal    echogenicity is hypoechoic.       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       No sonographic evidence of suspicious solid or cystic mass lesions.  No focal areas of suspicious atypical echogenicity.       IMPRESSION:   Stable solid mass in the left breast is benign.       Screening mammogram at age 38 is recommended.       =======================================   BI-RADS Category 2:  Benign       9/24/18, Right breast ultrasound, [facility]:     ULTRASOUND FINDINGS:   US with color only.           Finding 1:   Sonography was performed in the right breast using a radial and anti-radial approach.  No sonographic evidence of suspicious solid or cystic mass lesions. No focal areas of suspicious atypical echogenicity.       IMPRESSION:   There is no sonographic evidence of malignancy.       Screening mammogram at age 38 is recommended.       =======================================   BI-RADS Category 1:  Negative         1/24/19 - CT Soft Tissue Neck [facility]:     FINDINGS:   The parotid and submandibular glands appear normal in size and   attenuation with no focal lesions seen. The oropharynx and nasopharynx   appear normal. There is no evidence of tonsillar swelling. The   parapharyngeal spaces are intact. The epiglottis and aryepiglottic   folds appear normal. The thyroid gland is normal in size and enhances   homogeneously. The airway is patent. No thickening or nodularity is   seen in the region of the vocal cords.       There are scattered small bilateral cervical lymph nodes. There is a   digastric node on the right that measures 11 mm and a digastric node   on the left that measures 15 mm. There is a second node in the the   submandibular region between the submandibular gland and the left   sternocleidomastoid muscle measuring 10 mm. On the left there is a   similar node measuring 8 mm. All other nodes within the neck measure   less than 1 cm in size.        In the anterior mediastinum/retrosternal area anterior and superior   to the aortic arch there is an ill-defined soft tissue density that   measures 2.7 x 1.8 cm which is most likely residual thymic tissue in   this young patient. . This does not appear to be vascular. No other   enlarged lymph nodes are seen in the visible mediastinum or hilar   areas but these are not optimally evaluated. The cervical vessels are   patent bilaterally. The subcutaneous soft tissue and musculature   appear normal. The visible lung apices are expanded and clear. No   supraclavicular lymphadenopathy is seen.       The osseous structures appear unremarkable.  The visible portions of   the brain appear unremarkable. The paranasal sinuses and mastoid air   cells are clear.       CONCLUSION:   1. A few mildly prominent cervical nodes are seen in the anterior   upper neck bilaterally as described above. These are considered   nonspecific. These are probably reactive. In the anterior   mediastinum/posterior to the manubrium sternum there is a 2.7 x 1.8 cm   ill-defined soft tissue density which could represent residual thymic   tissue in this young patient. If there is palpable lymphadenopathy   elsewhere, follow-up CT of the chest, abdomen and pelvis is   recommended for further evaluation. 2. Otherwise negative CT of the neck.               1/31/19 - CT Chest - [facility]:  FINDINGS:       Previously seen finding is seen at thoracic inlet posterior to the   manubrium on prior represents normal left brachiocephalic vein. No   evidence of mediastinal mass or evidence of lymphadenopathy. The heart   is normal in size. No airspace opacity or evidence of pleural   effusion. There is no evidence of suspicious lung nodule or evidence   of mass. No evidence of pneumothorax. No axillary lymphadenopathy. View of the upper abdomen shows normal bilateral adrenal glands.           Impression       1. Findings seen on prior examination of neck soft tissue from January 24, 2019 seen posterior to the manubrium represents normal left   brachiocephalic vein.       2. No evidence of mediastinal or hilar lymphadenopathy.       3. Note made of a circumscribed, ovoid nodule seen in medial left   breast measures 17 x 9 mm possibly representing fibroadenoma. Correlation recommended. Ultrasound may be helpful for further   evaluation. Although CT and ultrasound imaging is not identical, the mass on CT has not increased in size. Patient is not symptomatic from the left breast fibroadenoma. She actually is more symptomatic from hormonal changes since her 3year-old son was born.   She is having more symptomatic discomfort and nodularity in the right breast.  Patient will discuss a trial of oral contraceptives with her gynecologist.  Questions answered to patient satisfaction. Plan:      1. Continue monthly breast self examination; detailed instructions reviewed today. Bring any changes to your physician's attention. 2. Continue healthy diet and exercise routinely as tolerated. 3. Avoid alcohol or limit alcohol intake to < 3 drinks per week. 4. Limit caffeine intake as possible. 5. Wear a good supportive bra at all times when awake. 6. Mammogram beginning at age 38.  9. Continue follow up with Primary Care and GYN. RTC if symptoms worsen or if new symptoms develop. OV PRN.

## 2019-05-09 ENCOUNTER — HOSPITAL ENCOUNTER (OUTPATIENT)
Age: 29
Discharge: HOME | End: 2019-05-11
Payer: COMMERCIAL

## 2019-05-09 ENCOUNTER — OFFICE VISIT (OUTPATIENT)
Dept: FAMILY MEDICINE CLINIC | Age: 29
End: 2019-05-09
Payer: COMMERCIAL

## 2019-05-09 VITALS
SYSTOLIC BLOOD PRESSURE: 124 MMHG | OXYGEN SATURATION: 97 % | BODY MASS INDEX: 29.95 KG/M2 | HEIGHT: 63 IN | DIASTOLIC BLOOD PRESSURE: 72 MMHG | WEIGHT: 169 LBS | RESPIRATION RATE: 16 BRPM | HEART RATE: 78 BPM

## 2019-05-09 DIAGNOSIS — L30.9 ECZEMA, UNSPECIFIED TYPE: ICD-10-CM

## 2019-05-09 DIAGNOSIS — Z00.01 ENCOUNTER FOR GENERAL ADULT MEDICAL EXAMINATION WITH ABNORMAL FINDINGS: Primary | ICD-10-CM

## 2019-05-09 DIAGNOSIS — E03.9 ACQUIRED HYPOTHYROIDISM: ICD-10-CM

## 2019-05-09 DIAGNOSIS — R53.83 FATIGUE, UNSPECIFIED TYPE: ICD-10-CM

## 2019-05-09 LAB
ALBUMIN SERPL-MCNC: 4.4 G/DL (ref 3.5–5.2)
ALP BLD-CCNC: 70 U/L (ref 35–104)
ALT SERPL-CCNC: 12 U/L (ref 0–32)
ANION GAP SERPL CALCULATED.3IONS-SCNC: 15 MMOL/L (ref 7–16)
AST SERPL-CCNC: 20 U/L (ref 0–31)
BILIRUB SERPL-MCNC: 0.3 MG/DL (ref 0–1.2)
BUN BLDV-MCNC: 14 MG/DL (ref 6–20)
CALCIUM SERPL-MCNC: 9 MG/DL (ref 8.6–10.2)
CHLORIDE BLD-SCNC: 104 MMOL/L (ref 98–107)
CHOLESTEROL, TOTAL: 170 MG/DL (ref 0–199)
CO2: 21 MMOL/L (ref 22–29)
CREAT SERPL-MCNC: 0.6 MG/DL (ref 0.5–1)
FOLATE: 10.9 NG/ML (ref 4.8–24.2)
GFR AFRICAN AMERICAN: >60
GFR NON-AFRICAN AMERICAN: >60 ML/MIN/1.73
GLUCOSE BLD-MCNC: 81 MG/DL (ref 74–99)
HCT VFR BLD CALC: 40.2 % (ref 34–48)
HDLC SERPL-MCNC: 37 MG/DL
HEMOGLOBIN: 12.6 G/DL (ref 11.5–15.5)
LDL CHOLESTEROL CALCULATED: 116 MG/DL (ref 0–99)
MCH RBC QN AUTO: 28.8 PG (ref 26–35)
MCHC RBC AUTO-ENTMCNC: 31.3 % (ref 32–34.5)
MCV RBC AUTO: 92 FL (ref 80–99.9)
PDW BLD-RTO: 12.7 FL (ref 11.5–15)
PLATELET # BLD: 233 E9/L (ref 130–450)
PMV BLD AUTO: 11.1 FL (ref 7–12)
POTASSIUM SERPL-SCNC: 4.4 MMOL/L (ref 3.5–5)
RBC # BLD: 4.37 E12/L (ref 3.5–5.5)
SODIUM BLD-SCNC: 140 MMOL/L (ref 132–146)
TOTAL PROTEIN: 7.5 G/DL (ref 6.4–8.3)
TRIGL SERPL-MCNC: 83 MG/DL (ref 0–149)
TSH SERPL DL<=0.05 MIU/L-ACNC: 0.47 UIU/ML (ref 0.27–4.2)
VITAMIN B-12: 555 PG/ML (ref 211–946)
VLDLC SERPL CALC-MCNC: 17 MG/DL
WBC # BLD: 5.8 E9/L (ref 4.5–11.5)

## 2019-05-09 PROCEDURE — 82607 VITAMIN B-12: CPT

## 2019-05-09 PROCEDURE — 99395 PREV VISIT EST AGE 18-39: CPT | Performed by: FAMILY MEDICINE

## 2019-05-09 PROCEDURE — 84443 ASSAY THYROID STIM HORMONE: CPT

## 2019-05-09 PROCEDURE — 82746 ASSAY OF FOLIC ACID SERUM: CPT

## 2019-05-09 PROCEDURE — 85027 COMPLETE CBC AUTOMATED: CPT

## 2019-05-09 PROCEDURE — 80061 LIPID PANEL: CPT

## 2019-05-09 PROCEDURE — 36415 COLL VENOUS BLD VENIPUNCTURE: CPT | Performed by: FAMILY MEDICINE

## 2019-05-09 PROCEDURE — 80053 COMPREHEN METABOLIC PANEL: CPT

## 2019-05-09 RX ORDER — LEVOTHYROXINE SODIUM 0.07 MG/1
75 TABLET ORAL DAILY
COMMUNITY
End: 2019-12-23 | Stop reason: SDUPTHER

## 2019-05-09 RX ORDER — CHOLECALCIFEROL (VITAMIN D3) 1250 MCG
CAPSULE ORAL
COMMUNITY
End: 2020-04-13

## 2019-05-09 RX ORDER — CITALOPRAM 10 MG/1
10 TABLET ORAL DAILY
Qty: 90 TABLET | Refills: 1 | Status: SHIPPED | OUTPATIENT
Start: 2019-05-09 | End: 2019-07-31 | Stop reason: SDUPTHER

## 2019-05-09 ASSESSMENT — PATIENT HEALTH QUESTIONNAIRE - PHQ9
SUM OF ALL RESPONSES TO PHQ9 QUESTIONS 1 & 2: 0
SUM OF ALL RESPONSES TO PHQ QUESTIONS 1-9: 0
1. LITTLE INTEREST OR PLEASURE IN DOING THINGS: 0
2. FEELING DOWN, DEPRESSED OR HOPELESS: 0
SUM OF ALL RESPONSES TO PHQ QUESTIONS 1-9: 0

## 2019-05-09 ASSESSMENT — ENCOUNTER SYMPTOMS
EYE REDNESS: 0
COUGH: 0
BACK PAIN: 0
VOMITING: 0
SHORTNESS OF BREATH: 0
COLOR CHANGE: 0
SORE THROAT: 0
WHEEZING: 0
CONSTIPATION: 0
RHINORRHEA: 0
BLOOD IN STOOL: 0
SINUS PRESSURE: 0
NAUSEA: 0
APNEA: 0
EYE PAIN: 0
EYE ITCHING: 0
DIARRHEA: 0
CHEST TIGHTNESS: 0
ABDOMINAL PAIN: 0

## 2019-05-09 NOTE — PROGRESS NOTES
Chief Complaint:     Chief Complaint   Patient presents with    Annual Exam    Rash    Other         Rash   This is a new problem. The current episode started in the past 7 days. The problem has been rapidly improving since onset. The affected locations include the left arm. The rash is characterized by itchiness and dryness. She was exposed to nothing. Associated symptoms include fatigue. Pertinent negatives include no congestion, cough, diarrhea, eye pain, fever, rhinorrhea, shortness of breath, sore throat or vomiting. Past treatments include moisturizer and topical steroids. The treatment provided mild relief. Other   This is a chronic problem. The problem occurs daily. The problem has been waxing and waning. Associated symptoms include fatigue and a rash. Pertinent negatives include no abdominal pain, arthralgias, chest pain, congestion, coughing, fever, headaches, myalgias, nausea, neck pain, numbness, sore throat, vomiting or weakness. Nothing aggravates the symptoms. She has tried nothing for the symptoms. The treatment provided no relief.        Patient Active Problem List   Diagnosis    Fatigue    Acquired hypothyroidism    Vitamin D deficiency    Gastroesophageal reflux disease without esophagitis    S/P tonsillectomy    Myalgia    Anxiety       Past Medical History:   Diagnosis Date    Asthma     excersise induced and allergy induced    Enlarged lymph node     at left neck, to have a bx 2018    Environmental allergies     IBS (irritable bowel syndrome)     Mitral valve prolapse     no regurgitation, with anxiety or caffeine developes palpitations,followed with Dr. Kaley Sanz, has appt with Dr. Smith Favorite 2018    Palpitations 2014    caffeine induced    Sore throat     frequent    Thyroid disease        Past Surgical History:   Procedure Laterality Date     SECTION      2 children     COLONOSCOPY  16    DENTAL SURGERY      extractions    OR BIOPSY OROPHARYNX Left 11/29/2018    LEFT TONGUE  BIOPSY performed by Yumiko Macedo MD at [facility] 61 OF TONSILS,12+ Y/O N/A 11/29/2018    TONSILLECTOMY performed by Yumiko Macedo MD at [facility] 57 N/A 12/6/2018    TONSILLECTOMY POSTOP HEMORRHAGE CONTROL performed by Yumiko Macedo MD at [facility] OR       Current Outpatient Medications   Medication Sig Dispense Refill    Cholecalciferol (VITAMIN D3) 48419 units CAPS Take by mouth      levothyroxine (SYNTHROID) 75 MCG tablet Take 75 mcg by mouth Daily      citalopram (CELEXA) 10 MG tablet Take 1 tablet by mouth daily 90 tablet 1    albuterol sulfate HFA (PROAIR HFA) 108 (90 Base) MCG/ACT inhaler Inhale 2 puffs into the lungs every 6 hours as needed for Wheezing 1 Inhaler 3    loratadine (CLARITIN) 10 MG tablet Take 10 mg by mouth daily      clonazePAM (KLONOPIN) 0.5 MG tablet Take 1 tablet by mouth 2 times daily as needed for Anxiety for up to 30 days. 30 tablet 0     No current facility-administered medications for this visit.         Allergies   Allergen Reactions    Cefdinir Hives     Had reaction as a child    Lomedia 24 Fe [Norethin Ace-Eth Estrad-Fe] Hives       Social History     Socioeconomic History    Marital status:      Spouse name: None    Number of children: None    Years of education: None    Highest education level: None   Occupational History     Employer: [address] [place] [address]     Employer: [employer]   Social Needs    Financial resource strain: None    Food insecurity:     Worry: None     Inability: None    Transportation needs:     Medical: None     Non-medical: None   Tobacco Use    Smoking status: Never Smoker    Smokeless tobacco: Never Used   Substance and Sexual Activity    Alcohol use: Yes     Comment: wine once in a while    Drug use: No    Sexual activity: None   Lifestyle    Physical activity:     Days per week: None     Minutes per session: None    Stress: None Relationships    Social connections:     Talks on phone: None     Gets together: None     Attends Tenriism service: None     Active member of club or organization: None     Attends meetings of clubs or organizations: None     Relationship status: None    Intimate partner violence:     Fear of current or ex partner: None     Emotionally abused: None     Physically abused: None     Forced sexual activity: None   Other Topics Concern    None   Social History Narrative    None       Family History   Problem Relation Age of Onset    Hypertension Mother         pulmonary hypertension     High Blood Pressure Mother     Other Mother     Heart Disease Father     Kidney Disease Father     Hypertension Father         IBS & polyps    Other Sister         gastric ulcer & IBS    Cancer Paternal Aunt 46        breast          Review of Systems   Constitutional: Positive for fatigue. Negative for activity change, appetite change and fever. HENT: Negative for congestion, ear pain, hearing loss, nosebleeds, rhinorrhea, sinus pressure and sore throat. Eyes: Negative for pain, redness, itching and visual disturbance. Respiratory: Negative for apnea, cough, chest tightness, shortness of breath and wheezing. Cardiovascular: Negative for chest pain, palpitations and leg swelling. Gastrointestinal: Negative for abdominal pain, blood in stool, constipation, diarrhea, nausea and vomiting. Endocrine: Negative. Genitourinary: Negative for decreased urine volume, difficulty urinating, dysuria, frequency, hematuria and urgency. Musculoskeletal: Negative for arthralgias, back pain, gait problem, myalgias and neck pain. Skin: Positive for rash. Negative for color change. Allergic/Immunologic: Negative for environmental allergies and food allergies. Neurological: Negative for dizziness, weakness, light-headedness, numbness and headaches. Hematological: Negative for adenopathy. Does not bruise/bleed easily. Psychiatric/Behavioral: Negative for behavioral problems, dysphoric mood and sleep disturbance. The patient is not nervous/anxious and is not hyperactive. All other systems reviewed and are negative. /72   Pulse 78   Resp 16   Ht 5' 3\" (1.6 m)   Wt 169 lb (76.7 kg)   LMP 04/09/2019   SpO2 97%   BMI 29.94 kg/m²     Physical Exam   Constitutional: She is oriented to person, place, and time. She appears well-developed and well-nourished. HENT:   Head: Normocephalic and atraumatic. Right Ear: External ear normal.   Left Ear: External ear normal.   Nose: Nose normal.   Mouth/Throat: Oropharynx is clear and moist.   Eyes: Pupils are equal, round, and reactive to light. Conjunctivae and EOM are normal. No scleral icterus. Neck: Normal range of motion. Neck supple. No thyromegaly present. Cardiovascular: Normal rate, regular rhythm and normal heart sounds. No murmur heard. Pulmonary/Chest: Effort normal and breath sounds normal. No respiratory distress. She has no wheezes. She has no rales. Abdominal: Soft. Bowel sounds are normal. She exhibits no distension. There is no tenderness. Musculoskeletal: Normal range of motion. She exhibits no edema or tenderness. Lymphadenopathy:     She has no cervical adenopathy. Neurological: She is alert and oriented to person, place, and time. She has normal reflexes. She displays normal reflexes. No cranial nerve deficit. Skin: Skin is warm and dry. No rash noted. No erythema. Psychiatric: She has a normal mood and affect. Nursing note and vitals reviewed. ASSESSMENT/PLAN:    Patient Active Problem List   Diagnosis    Fatigue    Acquired hypothyroidism    Vitamin D deficiency    Gastroesophageal reflux disease without esophagitis    S/P tonsillectomy    Myalgia    Anxiety       Garret was seen today for annual exam, rash and other.     Diagnoses and all orders for this visit:    Encounter for general adult medical examination with abnormal findings    Acquired hypothyroidism  -     CBC; Future  -     Lipid Panel; Future  -     Comprehensive Metabolic Panel; Future  -     TSH without Reflex; Future    Fatigue, unspecified type  -     Vitamin B12 & Folate; Future    Eczema, unspecified type    Other orders  -     citalopram (CELEXA) 10 MG tablet; Take 1 tablet by mouth daily          Return in about 1 year (around 5/9/2020) for well check.         Brad Chisholm DO  5/9/2019  8:38 AM

## 2019-06-11 ENCOUNTER — OFFICE VISIT (OUTPATIENT)
Dept: BREAST CENTER | Age: 29
End: 2019-06-11
Payer: COMMERCIAL

## 2019-06-11 VITALS
BODY MASS INDEX: 30.3 KG/M2 | TEMPERATURE: 98.8 F | HEART RATE: 74 BPM | SYSTOLIC BLOOD PRESSURE: 114 MMHG | HEIGHT: 63 IN | DIASTOLIC BLOOD PRESSURE: 78 MMHG | WEIGHT: 171 LBS | RESPIRATION RATE: 16 BRPM | OXYGEN SATURATION: 99 %

## 2019-06-11 DIAGNOSIS — N63.20 BREAST MASS, LEFT: ICD-10-CM

## 2019-06-11 PROCEDURE — 99213 OFFICE O/P EST LOW 20 MIN: CPT | Performed by: SURGERY

## 2019-06-11 NOTE — COMMUNICATION BODY
Assessment:     Sunshine Elmore is an extremely pleasant 34 y.o. female with FH of breast cancer in paternal aunt in her early 52's (currently late 52's). She has a history of US core biopsy proven fibroadenoma done at [facility] in 2011. She presents today with C/O of increasing discomfort within the left breast in area of mass, primarily with her cycle.         Bilateral breast US 03/14/2018 @ [facility]     Left Ultrasound       HISTORY:   The patient has no personal history of cancer. The patient has the following family history of breast cancer:  paternal aunt, at age 48. 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       ULTRASOUND TECHNIQUE:   High-resolution real-time ultrasound scanning was performed. Additional elastography and doppler color flow analysis of any finding was also obtained.       COMPARISON:   No prior imaging studies are available for comparison.       ULTRASOUND FINDINGS:           Finding 1:   Sonography was performed in the left breast using a radial and anti-radial approach. There is an oval solid mass with partially defined margins measuring 13 x 6 x 8 mm seen in the left breast at 8 o'clock located 3 centimeters from the nipple. Internal    echogenicity is hypoechoic.       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       It contains a clip which correlates with history of prior biopsy.       No sonographic evidence of suspicious solid or cystic mass lesions.  No focal areas of suspicious atypical echogenicity.       IMPRESSION:   Solid mass in the left breast is benign.       Screening mammogram at age 38 is recommended.       =======================================   BI-RADS Category 2:  Benign   =======================================      Right Ultrasound   HISTORY:   The patient has no personal history of cancer. The patient has the following family history of breast cancer:  paternal aunt, at age 48.  The patient has a 40 is recommended.       =======================================   BI-RADS Category 1:  Negative         1/24/19 - CT Soft Tissue Neck [facility]:     FINDINGS:   The parotid and submandibular glands appear normal in size and   attenuation with no focal lesions seen. The oropharynx and nasopharynx   appear normal. There is no evidence of tonsillar swelling. The   parapharyngeal spaces are intact. The epiglottis and aryepiglottic   folds appear normal. The thyroid gland is normal in size and enhances   homogeneously. The airway is patent. No thickening or nodularity is   seen in the region of the vocal cords.       There are scattered small bilateral cervical lymph nodes. There is a   digastric node on the right that measures 11 mm and a digastric node   on the left that measures 15 mm. There is a second node in the the   submandibular region between the submandibular gland and the left   sternocleidomastoid muscle measuring 10 mm. On the left there is a   similar node measuring 8 mm. All other nodes within the neck measure   less than 1 cm in size.        In the anterior mediastinum/retrosternal area anterior and superior   to the aortic arch there is an ill-defined soft tissue density that   measures 2.7 x 1.8 cm which is most likely residual thymic tissue in   this young patient. . This does not appear to be vascular. No other   enlarged lymph nodes are seen in the visible mediastinum or hilar   areas but these are not optimally evaluated. The cervical vessels are   patent bilaterally. The subcutaneous soft tissue and musculature   appear normal. The visible lung apices are expanded and clear. No   supraclavicular lymphadenopathy is seen.       The osseous structures appear unremarkable. The visible portions of   the brain appear unremarkable. The paranasal sinuses and mastoid air   cells are clear.       CONCLUSION:   1.  A few mildly prominent cervical nodes are seen in the anterior   upper neck 1. Continue monthly breast self examination; detailed instructions reviewed today. Bring any changes to your physician's attention. 2. Continue healthy diet and exercise routinely as tolerated. 3. Avoid alcohol or limit alcohol intake to < 3 drinks per week. 4. Limit caffeine intake as possible. 5. Wear a good supportive bra at all times when awake. 6. Mammogram beginning at age 38.  9. Continue follow up with Primary Care and GYN. RTC if symptoms worsen or if new symptoms develop. OV PRN.

## 2019-06-25 ENCOUNTER — TELEPHONE (OUTPATIENT)
Dept: CARDIOLOGY CLINIC | Age: 29
End: 2019-06-25

## 2019-06-25 NOTE — TELEPHONE ENCOUNTER
Patient with history of MVP. She would like to know if she needs premedicated prior to dental procedures. Please advise.

## 2019-06-25 NOTE — TELEPHONE ENCOUNTER
No she does not need antibiotics. Kamini Heart D.O.   Cardiologist  Cardiology, [facility]

## 2019-06-28 ENCOUNTER — OFFICE VISIT (OUTPATIENT)
Dept: FAMILY MEDICINE CLINIC | Age: 29
End: 2019-06-28
Payer: COMMERCIAL

## 2019-06-28 VITALS
HEIGHT: 63 IN | SYSTOLIC BLOOD PRESSURE: 116 MMHG | HEART RATE: 85 BPM | OXYGEN SATURATION: 97 % | BODY MASS INDEX: 30.3 KG/M2 | DIASTOLIC BLOOD PRESSURE: 70 MMHG | RESPIRATION RATE: 16 BRPM | WEIGHT: 171 LBS

## 2019-06-28 DIAGNOSIS — F41.9 ANXIETY: ICD-10-CM

## 2019-06-28 DIAGNOSIS — R53.83 FATIGUE, UNSPECIFIED TYPE: Primary | ICD-10-CM

## 2019-06-28 PROCEDURE — 99214 OFFICE O/P EST MOD 30 MIN: CPT | Performed by: FAMILY MEDICINE

## 2019-06-28 RX ORDER — CLONAZEPAM 0.5 MG/1
0.5 TABLET ORAL 2 TIMES DAILY PRN
Qty: 30 TABLET | Refills: 0 | Status: SHIPPED | OUTPATIENT
Start: 2019-06-28 | End: 2019-08-06 | Stop reason: SDUPTHER

## 2019-06-28 ASSESSMENT — ENCOUNTER SYMPTOMS
VOMITING: 0
BACK PAIN: 0
SORE THROAT: 0
SINUS PRESSURE: 0
EYE ITCHING: 0
VISUAL CHANGE: 0
SWOLLEN GLANDS: 0
NAUSEA: 0
APNEA: 0
WHEEZING: 0
SHORTNESS OF BREATH: 0
DIARRHEA: 0
CONSTIPATION: 0
COUGH: 0
RHINORRHEA: 0
ABDOMINAL PAIN: 0
COLOR CHANGE: 0
EYE PAIN: 0
EYE REDNESS: 0
BLOOD IN STOOL: 0
CHEST TIGHTNESS: 0
CHANGE IN BOWEL HABIT: 0

## 2019-06-28 ASSESSMENT — PATIENT HEALTH QUESTIONNAIRE - PHQ9
SUM OF ALL RESPONSES TO PHQ9 QUESTIONS 1 & 2: 0
1. LITTLE INTEREST OR PLEASURE IN DOING THINGS: 0
SUM OF ALL RESPONSES TO PHQ QUESTIONS 1-9: 0
2. FEELING DOWN, DEPRESSED OR HOPELESS: 0
SUM OF ALL RESPONSES TO PHQ QUESTIONS 1-9: 0

## 2019-06-28 NOTE — PROGRESS NOTES
Chief Complaint:     Chief Complaint   Patient presents with    Anxiety     discuss being referred to psychiatrist   [address] Problem    Fatigue         Mental Health Problem   The primary symptoms do not include dysphoric mood. The current episode started more than 1 month ago. This is a recurrent problem. The onset of the illness is precipitated by a stressful event and emotional stress. The degree of incapacity that she is experiencing as a consequence of her illness is mild. Additional symptoms of the illness include fatigue. Additional symptoms of the illness do not include anhedonia, insomnia, hypersomnia, appetite change, unexpected weight change, visual change, headaches or abdominal pain. She does not admit to suicidal ideas. She does not have a plan to commit suicide. She does not contemplate harming herself. She has not already injured self. She does not contemplate injuring another person. She has not already  injured another person. Risk factors that are present for mental illness include a history of mental illness. Fatigue   This is a recurrent problem. The current episode started more than 1 month ago. The problem occurs intermittently. The problem has been waxing and waning. Associated symptoms include fatigue and myalgias. Pertinent negatives include no abdominal pain, arthralgias, change in bowel habit, chest pain, chills, congestion, coughing, fever, headaches, joint swelling, nausea, neck pain, numbness, rash, sore throat, swollen glands, urinary symptoms, vertigo, visual change, vomiting or weakness. Nothing aggravates the symptoms. She has tried nothing for the symptoms. The treatment provided no relief. Other   This is a new problem. The current episode started more than 1 month ago. The problem occurs intermittently. The problem has been waxing and waning. Associated symptoms include fatigue and myalgias.  Pertinent negatives include no abdominal pain, arthralgias, change in bowel habit, chest pain, chills, congestion, coughing, fever, headaches, joint swelling, nausea, neck pain, numbness, rash, sore throat, swollen glands, urinary symptoms, vertigo, visual change, vomiting or weakness. Nothing aggravates the symptoms. She has tried nothing for the symptoms. The treatment provided no relief. Patient Active Problem List   Diagnosis    Fatigue    Acquired hypothyroidism    Vitamin D deficiency    Gastroesophageal reflux disease without esophagitis    S/P tonsillectomy    Myalgia    Anxiety    Breast mass, left       Past Medical History:   Diagnosis Date    Asthma     excersise induced and allergy induced    Enlarged lymph node     at left neck, to have a bx 2018    Environmental allergies     IBS (irritable bowel syndrome)     Mitral valve prolapse     no regurgitation, with anxiety or caffeine developes palpitations,followed with Dr. Shirin Lucia, has appt with Dr. Chapito Lee 2018    Palpitations 2014    caffeine induced    Sore throat     frequent    Thyroid disease        Past Surgical History:   Procedure Laterality Date     SECTION      2 children     COLONOSCOPY  16    DENTAL SURGERY      extractions    [place] BIOPSY OROPHARYNX Left 2018    LEFT TONGUE  BIOPSY performed by Jennifer Bai MD at [place] [id number] OF TONSILS,12+ Y/O N/A 2018    TONSILLECTOMY [facility] N/A 2018    TONSILLECTOMY POSTOP HEMORRHAGE CONTROL performed by [facility] MD at [facility] OR       Current Outpatient Medications   Medication Sig Dispense Refill    clonazePAM (KLONOPIN) 0.5 MG tablet Take 1 tablet by mouth 2 times daily as needed for Anxiety for up to 30 days.  30 tablet 0    Cholecalciferol (VITAMIN D3) 32002 units CAPS Take by mouth      levothyroxine (SYNTHROID) 75 MCG tablet Take 75 mcg by mouth Daily      citalopram (CELEXA) 10 MG tablet Take 1 tablet by mouth daily 90 tablet 1    albuterol sulfate HFA (PROAIR HFA) 108 (90 Base) MCG/ACT inhaler Inhale 2 puffs into the lungs every 6 hours as needed for Wheezing 1 Inhaler 3    loratadine (CLARITIN) 10 MG tablet Take 10 mg by mouth daily       No current facility-administered medications for this visit.         Allergies   Allergen Reactions    Cefdinir Hives     Had reaction as a child    Lomedia 24 Fe [Norethin Ace-Eth Estrad-Fe] Hives       Social History     Socioeconomic History    Marital status:      Spouse name: None    Number of children: None    Years of education: None    Highest education level: None   Occupational History     Employer: [employer]     Employer: [employer]   Social Needs    Financial resource strain: None    Food insecurity:     Worry: None     Inability: None    Transportation needs:     Medical: None     Non-medical: None   Tobacco Use    Smoking status: Never Smoker    Smokeless tobacco: Never Used   Substance and Sexual Activity    Alcohol use: Yes     Comment: wine once in a while    Drug use: No    Sexual activity: None   Lifestyle    Physical activity:     Days per week: None     Minutes per session: None    Stress: None   Relationships    Social connections:     Talks on phone: None     Gets together: None     Attends Christian service: None     Active member of club or organization: None     Attends meetings of clubs or organizations: None     Relationship status: None    Intimate partner violence:     Fear of current or ex partner: None     Emotionally abused: None     Physically abused: None     Forced sexual activity: None   Other Topics Concern    None   Social History Narrative    None       Family History   Problem Relation Age of Onset    Hypertension Mother         pulmonary hypertension     High Blood Pressure Mother     Other Mother     Heart Disease Father     Kidney Disease Father     Hypertension Father         IBS & polyps    Other Sister         gastric ulcer & IBS    Cancer Paternal Aunt 46        breast          Review of Systems   Constitutional: Positive for fatigue. Negative for activity change, appetite change, chills, fever and unexpected weight change. HENT: Negative for congestion, ear pain, hearing loss, nosebleeds, rhinorrhea, sinus pressure and sore throat. Eyes: Negative for pain, redness, itching and visual disturbance. Respiratory: Negative for apnea, cough, chest tightness, shortness of breath and wheezing. Cardiovascular: Negative for chest pain, palpitations and leg swelling. Gastrointestinal: Negative for abdominal pain, blood in stool, change in bowel habit, constipation, diarrhea, nausea and vomiting. Endocrine: Negative. Genitourinary: Negative for decreased urine volume, difficulty urinating, dysuria, frequency, hematuria and urgency. Musculoskeletal: Positive for myalgias. Negative for arthralgias, back pain, gait problem, joint swelling and neck pain. Skin: Negative for color change and rash. Allergic/Immunologic: Negative for environmental allergies and food allergies. Neurological: Negative for dizziness, vertigo, weakness, light-headedness, numbness and headaches. Hematological: Negative for adenopathy. Does not bruise/bleed easily. Psychiatric/Behavioral: Negative for behavioral problems, dysphoric mood and sleep disturbance. The patient is not nervous/anxious, does not have insomnia and is not hyperactive. All other systems reviewed and are negative. /70   Pulse 85   Resp 16   Ht 5' 3\" (1.6 m)   Wt 171 lb (77.6 kg)   LMP 06/12/2019   SpO2 97%   BMI 30.29 kg/m²     Physical Exam   Constitutional: She is oriented to person, place, and time. She appears well-developed and well-nourished. HENT:   Head: Normocephalic and atraumatic.    Right Ear: External ear normal.   Left Ear: External ear normal.   Nose: Nose normal.   Mouth/Throat: Oropharynx

## 2019-07-31 RX ORDER — CITALOPRAM 10 MG/1
10 TABLET ORAL DAILY
Qty: 90 TABLET | Refills: 1 | Status: SHIPPED | OUTPATIENT
Start: 2019-07-31 | End: 2019-08-19 | Stop reason: DRUGHIGH

## 2019-08-06 DIAGNOSIS — F41.9 ANXIETY: ICD-10-CM

## 2019-08-06 RX ORDER — CLONAZEPAM 0.5 MG/1
0.5 TABLET ORAL 2 TIMES DAILY PRN
Qty: 60 TABLET | Refills: 2 | Status: SHIPPED
Start: 2019-08-06 | End: 2020-03-10 | Stop reason: SDUPTHER

## 2019-08-19 RX ORDER — CITALOPRAM 20 MG/1
20 TABLET ORAL DAILY
COMMUNITY
End: 2019-08-19 | Stop reason: SDUPTHER

## 2019-08-19 RX ORDER — CITALOPRAM 20 MG/1
20 TABLET ORAL DAILY
Qty: 30 TABLET | Refills: 5 | Status: SHIPPED
Start: 2019-08-19 | End: 2020-02-17

## 2019-08-29 ENCOUNTER — TELEPHONE (OUTPATIENT)
Dept: PRIMARY CARE CLINIC | Age: 29
End: 2019-08-29

## 2019-08-29 RX ORDER — ALBUTEROL SULFATE 90 UG/1
2 AEROSOL, METERED RESPIRATORY (INHALATION) 4 TIMES DAILY PRN
Qty: 1 INHALER | Refills: 0 | Status: SHIPPED
Start: 2019-08-29 | End: 2020-04-13 | Stop reason: SDUPTHER

## 2019-11-25 DIAGNOSIS — N63.0 BREAST MASS: Primary | ICD-10-CM

## 2019-12-10 ENCOUNTER — HOSPITAL ENCOUNTER (OUTPATIENT)
Dept: GENERAL RADIOLOGY | Age: 29
Discharge: HOME | End: 2019-12-12
Payer: COMMERCIAL

## 2019-12-10 DIAGNOSIS — N63.0 BREAST MASS: ICD-10-CM

## 2019-12-12 DIAGNOSIS — N63.0 BREAST MASS: Primary | ICD-10-CM

## 2019-12-23 RX ORDER — LEVOTHYROXINE SODIUM 0.07 MG/1
75 TABLET ORAL DAILY
Qty: 30 TABLET | Refills: 1 | Status: SHIPPED
Start: 2019-12-23 | End: 2020-02-17

## 2020-01-13 ASSESSMENT — ENCOUNTER SYMPTOMS
EYE ITCHING: 0
VOMITING: 0
ABDOMINAL PAIN: 0
BLOOD IN STOOL: 0
SHORTNESS OF BREATH: 0
NAUSEA: 0
RHINORRHEA: 0
BACK PAIN: 0
DIARRHEA: 0
EYE DISCHARGE: 0
CONSTIPATION: 0
CHEST TIGHTNESS: 0
SINUS PRESSURE: 0
VOICE CHANGE: 0
CHOKING: 0
COUGH: 0
SORE THROAT: 0
ABDOMINAL DISTENTION: 0
WHEEZING: 0
TROUBLE SWALLOWING: 0
SINUS PAIN: 0

## 2020-01-13 NOTE — PROGRESS NOTES
mediastinum/retrosternal area anterior and superior   to the aortic arch there is an ill-defined soft tissue density that   measures 2.7 x 1.8 cm which is most likely residual thymic tissue in   this young patient. . This does not appear to be vascular. No other   enlarged lymph nodes are seen in the visible mediastinum or hilar   areas but these are not optimally evaluated. The cervical vessels are   patent bilaterally. The subcutaneous soft tissue and musculature   appear normal. The visible lung apices are expanded and clear. No   supraclavicular lymphadenopathy is seen.       The osseous structures appear unremarkable. The visible portions of   the brain appear unremarkable. The paranasal sinuses and mastoid air   cells are clear.       CONCLUSION:   1. A few mildly prominent cervical nodes are seen in the anterior   upper neck bilaterally as described above. These are considered   nonspecific. These are probably reactive. In the anterior   mediastinum/posterior to the manubrium sternum there is a 2.7 x 1.8 cm   ill-defined soft tissue density which could represent residual thymic   tissue in this young patient. If there is palpable lymphadenopathy   elsewhere, follow-up CT of the chest, abdomen and pelvis is   recommended for further evaluation. 2. Otherwise negative CT of the neck.               1/31/19 - CT Chest - [facility]:  FINDINGS:       Previously seen finding is seen at thoracic inlet posterior to the   manubrium on prior represents normal left brachiocephalic vein. No   evidence of mediastinal mass or evidence of lymphadenopathy. The heart   is normal in size. No airspace opacity or evidence of pleural   effusion. There is no evidence of suspicious lung nodule or evidence   of mass. No evidence of pneumothorax. No axillary lymphadenopathy. View of the upper abdomen shows normal bilateral adrenal glands.           Impression       1.  Findings seen on prior examination of neck soft tissue from January 24, 2019 seen posterior to the manubrium represents normal left   brachiocephalic vein.       2. No evidence of mediastinal or hilar lymphadenopathy.       3. Note made of a circumscribed, ovoid nodule seen in medial left   breast measures 17 x 9 mm possibly representing fibroadenoma. Correlation recommended. Ultrasound may be helpful for further   evaluation. 11/29/18 tonsillectomy, tongue biopsy; benign. Diagnosis:  A.  Bilateral tonsils, tonsillectomy: Tonsillar tissue with reactive  follicular lymphoid hyperplasia. Aggregates of bacteria morphologically compatible with Actinomyces  species are present within tonsillar crypts of one tonsil. B.  Left tongue, biopsy: Lingual tonsil tissue with reactive follicular  lymphoid hyperplasia. Complicated by re-bleed X2. Since son born 2 years ago having severe PMS, breast changes (R>L), acne, nipple sensitivity. 1/20/2020 - Right breast ultrasound - [facility]: ULTRASOUND FINDINGS:  Finding 1:  Sonography was performed in the right breast using a radial and anti-radial approach. No sonographic evidence of suspicious solid or cystic mass lesions. No focal areas of suspicious atypical echogenicity. IMPRESSION:  There is no sonographic evidence of malignancy. Screening mammogram at age 38 is recommended. All imaging reviewed with patient. Ultrasound from March 14, 2018 compared to imaging from today. Persistent nodular breast tissue which appears benign.               Past Medical History:   Diagnosis Date    Asthma     excersise induced and allergy induced    Enlarged lymph node     at left neck, to have a bx 11/23/2018    Environmental allergies     IBS (irritable bowel syndrome)     Mitral valve prolapse     no regurgitation, with anxiety or caffeine developes palpitations,followed with Dr. Belen Lovelace, has appt with Dr. Vandana Calderon 11/27/2018    Palpitations 06/06/2014    caffeine induced    Sore throat     frequent    Thyroid Allergic/Immunologic: Negative for environmental allergies and food allergies. Neurological: Negative for dizziness, seizures, syncope, speech difficulty, weakness, light-headedness and headaches. Hematological: Negative for adenopathy. Does not bruise/bleed easily. Psychiatric/Behavioral: Negative for agitation, confusion and decreased concentration. The patient is not nervous/anxious. Objective:   Physical Exam  Vitals signs and nursing note reviewed. Constitutional:       General: She is not in acute distress. Appearance: She is well-developed. She is not diaphoretic. HENT:      Head: Normocephalic and atraumatic. Mouth/Throat:      Pharynx: No oropharyngeal exudate. Eyes:      General: No scleral icterus. Right eye: No discharge. Left eye: No discharge. Conjunctiva/sclera: Conjunctivae normal.   Neck:      Musculoskeletal: Normal range of motion and neck supple. Thyroid: No thyromegaly. Vascular: No JVD. Trachea: No tracheal deviation. Cardiovascular:      Rate and Rhythm: Normal rate and regular rhythm. Heart sounds: No murmur. No friction rub. No gallop. Pulmonary:      Effort: Pulmonary effort is normal. No respiratory distress or retractions. Breath sounds: Normal breath sounds. No stridor. No wheezing or rales. Chest:      Chest wall: No mass, lacerations, deformity, swelling, tenderness or edema. Breasts: Breasts are symmetrical.         Right: No inverted nipple, mass, nipple discharge, skin change or tenderness. Left: Mass (barely palpable-more of a fullnes) present. No inverted nipple, nipple discharge, skin change or tenderness. Abdominal:      General: There is no distension. Palpations: Abdomen is soft. Tenderness: There is no tenderness. There is no guarding or rebound. Musculoskeletal: Normal range of motion. General: No tenderness or deformity.       Right shoulder: Normal. Left shoulder: Normal.   Lymphadenopathy:      Cervical: No cervical adenopathy. Right cervical: No superficial, deep or posterior cervical adenopathy. Left cervical: No superficial, deep or posterior cervical adenopathy. Upper Body:      Right upper body: No pectoral adenopathy. Left upper body: No pectoral adenopathy. Skin:     General: Skin is warm and dry. Coloration: Skin is not pale. Findings: No erythema or rash. Neurological:      Mental Status: She is alert and oriented to person, place, and time. Coordination: Coordination normal.   Psychiatric:         Behavior: Behavior normal.         Thought Content: Thought content normal.         Judgment: Judgment normal.        Assessment:      Madhu Schaefer is an extremely pleasant 34 y.o. female with FH of breast cancer in paternal aunt in her early 52's (currently late 52's). She has a history of US core biopsy proven fibroadenoma done at [facility] in 2011. She presents today with C/O of increasing discomfort within the left breast in area of mass, primarily with her cycle.         Bilateral breast US 03/14/2018 @ [address] [place] [address] Dr Mariano     Left Ultrasound       HISTORY:   The patient has no personal history of cancer. The patient has the following family history of breast cancer:  paternal aunt, at age 48. 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       ULTRASOUND TECHNIQUE:   High-resolution real-time ultrasound scanning was performed. Additional elastography and doppler color flow analysis of any finding was also obtained.       COMPARISON:   No prior imaging studies are available for comparison.       ULTRASOUND FINDINGS:           Finding 1:   Sonography was performed in the left breast using a radial and anti-radial approach.  There is an oval solid mass with partially defined margins measuring 13 x 6 x 8 mm seen in the left breast at 8 o'clock located 3 centimeters from the nipple. Internal    echogenicity is hypoechoic.       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       It contains a clip which correlates with history of prior biopsy.       No sonographic evidence of suspicious solid or cystic mass lesions.  No focal areas of suspicious atypical echogenicity.       IMPRESSION:   Solid mass in the left breast is benign.       Screening mammogram at age 38 is recommended.       =======================================   BI-RADS Category 2:  Benign   =======================================      Right Ultrasound   HISTORY:   The patient has no personal history of cancer. The patient has the following family history of breast cancer:  paternal aunt, at age 48. 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       ULTRASOUND TECHNIQUE:   High-resolution real-time ultrasound scanning was performed. Additional elastography and doppler color flow analysis of any finding was also obtained.       COMPARISON:   No prior imaging studies are available for comparison.       ULTRASOUND FINDINGS:   Finding 1:   Sonography was performed in the right breast using a radial and anti-radial approach. No sonographic evidence of suspicious solid or cystic mass lesions. No focal areas of suspicious atypical echogenicity.       IMPRESSION:   There is no sonographic evidence of malignancy.       Screening mammogram at age 38 is recommended.       =======================================   BI-RADS Category 1:  Negative        9/24/18, Left breast ultrasound, [facility]:     ULTRASOUND FINDINGS:           Finding 1:   Sonography was performed in the left breast using a radial and anti-radial approach. There is a stable oval well circumscribed solid mass measuring 18 x 8 x 11 mm seen in the left breast at 8 o'clock located 5 centimeters from the nipple.  Internal    echogenicity is hypoechoic.       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       No sonographic evidence of suspicious solid or cystic mass lesions.  No focal areas of suspicious atypical echogenicity.       IMPRESSION:   Stable solid mass in the left breast is benign.       Screening mammogram at age 38 is recommended.       =======================================   BI-RADS Category 2:  Benign       9/24/18, Right breast ultrasound, [facility]:     ULTRASOUND FINDINGS:   US with color only.           Finding 1:   Sonography was performed in the right breast using a radial and anti-radial approach. No sonographic evidence of suspicious solid or cystic mass lesions. No focal areas of suspicious atypical echogenicity.       IMPRESSION:   There is no sonographic evidence of malignancy.       Screening mammogram at age 38 is recommended.       =======================================   BI-RADS Category 1:  Negative         1/24/19 - CT Soft Tissue Neck [facility]:     FINDINGS:   The parotid and submandibular glands appear normal in size and   attenuation with no focal lesions seen. The oropharynx and nasopharynx   appear normal. There is no evidence of tonsillar swelling. The   parapharyngeal spaces are intact. The epiglottis and aryepiglottic   folds appear normal. The thyroid gland is normal in size and enhances   homogeneously. The airway is patent. No thickening or nodularity is   seen in the region of the vocal cords.       There are scattered small bilateral cervical lymph nodes. There is a   digastric node on the right that measures 11 mm and a digastric node   on the left that measures 15 mm. There is a second node in the the   submandibular region between the submandibular gland and the left   sternocleidomastoid muscle measuring 10 mm. On the left there is a   similar node measuring 8 mm.  All other nodes within the neck measure   less than 1 cm in size.        In the anterior mediastinum/retrosternal area anterior and superior   to the aortic arch there is an ill-defined soft tissue evidence of mediastinal or hilar lymphadenopathy.       3. Note made of a circumscribed, ovoid nodule seen in medial left   breast measures 17 x 9 mm possibly representing fibroadenoma. Correlation recommended. Ultrasound may be helpful for further   evaluation. Although CT and ultrasound imaging is not identical, the mass on CT has not increased in size. Patient is not symptomatic from the left breast fibroadenoma. She actually is more symptomatic from hormonal changes since her 3year-old son was born. She is having more symptomatic discomfort and nodularity in the right breast.  Patient will discuss a trial of oral contraceptives with her gynecologist.  Questions answered to patient satisfaction. 1/20/2020 - Right breast ultrasound - [facility]: ULTRASOUND FINDINGS:  Finding 1:  Sonography was performed in the right breast using a radial and anti-radial approach. No sonographic evidence of suspicious solid or cystic mass lesions. No focal areas of suspicious atypical echogenicity. IMPRESSION:  There is no sonographic evidence of malignancy. Screening mammogram at age 38 is recommended. All imaging reviewed and compared. Mass of breast tissue is slightly more prominent on today's imaging but continues to look benign. We discussed the fact that discomfort would likely resolve. We discussed that there are no contraindications to pregnancy at this point. Patient will follow us on a as needed basis and will get screening mammograms at age 38. Questions answered to patient satisfaction. Plan:      1. Continue monthly breast self examination; detailed instructions reviewed today. Bring any changes to your physician's attention. 2. Continue healthy diet and exercise routinely as tolerated. 3. Avoid alcohol or limit alcohol intake to < 3 drinks per week. 4. Limit caffeine intake as possible. 5. Wear a good supportive bra at all times when awake.   6. Mammogram beginning at age

## 2020-01-20 ENCOUNTER — HOSPITAL ENCOUNTER (OUTPATIENT)
Dept: GENERAL RADIOLOGY | Age: 30
Discharge: HOME | End: 2020-01-22
Payer: COMMERCIAL

## 2020-01-20 ENCOUNTER — OFFICE VISIT (OUTPATIENT)
Dept: BREAST CENTER | Age: 30
End: 2020-01-20
Payer: COMMERCIAL

## 2020-01-20 VITALS
SYSTOLIC BLOOD PRESSURE: 110 MMHG | HEIGHT: 63 IN | BODY MASS INDEX: 30.3 KG/M2 | RESPIRATION RATE: 16 BRPM | OXYGEN SATURATION: 99 % | TEMPERATURE: 98.1 F | HEART RATE: 78 BPM | DIASTOLIC BLOOD PRESSURE: 70 MMHG | WEIGHT: 171 LBS

## 2020-01-20 PROCEDURE — 99213 OFFICE O/P EST LOW 20 MIN: CPT

## 2020-01-20 PROCEDURE — 76642 ULTRASOUND BREAST LIMITED: CPT

## 2020-01-20 PROCEDURE — 99213 OFFICE O/P EST LOW 20 MIN: CPT | Performed by: SURGERY

## 2020-01-20 NOTE — COMMUNICATION BODY
Assessment:  Phoebe Larose is an extremely pleasant 34 y.o. female with FH of breast cancer in paternal aunt in her early 52's (currently late 52's). She has a history of US core biopsy proven fibroadenoma done at [facility] in 2011. She presents today with C/O of increasing discomfort within the left breast in area of mass, primarily with her cycle.         Bilateral breast US 03/14/2018 @ [address] [place] [address] Dr Mariano     Left Ultrasound       HISTORY:   The patient has no personal history of cancer. The patient has the following family history of breast cancer:  paternal aunt, at age 48. 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       ULTRASOUND TECHNIQUE:   High-resolution real-time ultrasound scanning was performed. Additional elastography and doppler color flow analysis of any finding was also obtained.       COMPARISON:   No prior imaging studies are available for comparison.       ULTRASOUND FINDINGS:           Finding 1:   Sonography was performed in the left breast using a radial and anti-radial approach. There is an oval solid mass with partially defined margins measuring 13 x 6 x 8 mm seen in the left breast at 8 o'clock located 3 centimeters from the nipple. Internal    echogenicity is hypoechoic.       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       It contains a clip which correlates with history of prior biopsy.       No sonographic evidence of suspicious solid or cystic mass lesions.  No focal areas of suspicious atypical echogenicity.       IMPRESSION:   Solid mass in the left breast is benign.       Screening mammogram at age 38 is recommended.       =======================================   BI-RADS Category 2:  Benign   =======================================      Right Ultrasound   HISTORY:   The patient has no personal history of cancer. The patient has the following family history of breast cancer:  paternal aunt, at age 48.  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       ULTRASOUND TECHNIQUE:   High-resolution real-time ultrasound scanning was performed. Additional elastography and doppler color flow analysis of any finding was also obtained.       COMPARISON:   No prior imaging studies are available for comparison.       ULTRASOUND FINDINGS:   Finding 1:   Sonography was performed in the right breast using a radial and anti-radial approach. No sonographic evidence of suspicious solid or cystic mass lesions. No focal areas of suspicious atypical echogenicity.       IMPRESSION:   There is no sonographic evidence of malignancy.       Screening mammogram at age 38 is recommended.       =======================================   BI-RADS Category 1:  Negative        9/24/18, Left breast ultrasound, [facility]:     ULTRASOUND FINDINGS:           Finding 1:   Sonography was performed in the left breast using a radial and anti-radial approach. There is a stable oval well circumscribed solid mass measuring 18 x 8 x 11 mm seen in the left breast at 8 o'clock located 5 centimeters from the nipple. Internal    echogenicity is hypoechoic.       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       No sonographic evidence of suspicious solid or cystic mass lesions.  No focal areas of suspicious atypical echogenicity.       IMPRESSION:   Stable solid mass in the left breast is benign.       Screening mammogram at age 38 is recommended.       =======================================   BI-RADS Category 2:  Benign       9/24/18, Right breast ultrasound, [facility]:     ULTRASOUND FINDINGS:   US with color only.           Finding 1:   Sonography was performed in the right breast using a radial and anti-radial approach. No sonographic evidence of suspicious solid or cystic mass lesions.  No focal areas of suspicious atypical echogenicity.       IMPRESSION:   There is no sonographic evidence of malignancy.       Screening mammogram at age 42 is recommended.       =======================================   BI-RADS Category 1:  Negative         1/24/19 - CT Soft Tissue Neck [facility]:     FINDINGS:   The parotid and submandibular glands appear normal in size and   attenuation with no focal lesions seen. The oropharynx and nasopharynx   appear normal. There is no evidence of tonsillar swelling. The   parapharyngeal spaces are intact. The epiglottis and aryepiglottic   folds appear normal. The thyroid gland is normal in size and enhances   homogeneously. The airway is patent. No thickening or nodularity is   seen in the region of the vocal cords.       There are scattered small bilateral cervical lymph nodes. There is a   digastric node on the right that measures 11 mm and a digastric node   on the left that measures 15 mm. There is a second node in the the   submandibular region between the submandibular gland and the left   sternocleidomastoid muscle measuring 10 mm. On the left there is a   similar node measuring 8 mm. All other nodes within the neck measure   less than 1 cm in size.        In the anterior mediastinum/retrosternal area anterior and superior   to the aortic arch there is an ill-defined soft tissue density that   measures 2.7 x 1.8 cm which is most likely residual thymic tissue in   this young patient. . This does not appear to be vascular. No other   enlarged lymph nodes are seen in the visible mediastinum or hilar   areas but these are not optimally evaluated. The cervical vessels are   patent bilaterally. The subcutaneous soft tissue and musculature   appear normal. The visible lung apices are expanded and clear. No   supraclavicular lymphadenopathy is seen.       The osseous structures appear unremarkable. The visible portions of   the brain appear unremarkable. The paranasal sinuses and mastoid air   cells are clear.       CONCLUSION:   1.  A few mildly prominent cervical nodes are seen in the anterior   upper neck bilaterally as described above. These are considered   nonspecific. These are probably reactive. In the anterior   mediastinum/posterior to the manubrium sternum there is a 2.7 x 1.8 cm   ill-defined soft tissue density which could represent residual thymic   tissue in this young patient. If there is palpable lymphadenopathy   elsewhere, follow-up CT of the chest, abdomen and pelvis is   recommended for further evaluation. 2. Otherwise negative CT of the neck.               1/31/19 - CT Chest [facility]:  FINDINGS:       Previously seen finding is seen at thoracic inlet posterior to the   manubrium on prior represents normal left brachiocephalic vein. No   evidence of mediastinal mass or evidence of lymphadenopathy. The heart   is normal in size. No airspace opacity or evidence of pleural   effusion. There is no evidence of suspicious lung nodule or evidence   of mass. No evidence of pneumothorax. No axillary lymphadenopathy. View of the upper abdomen shows normal bilateral adrenal glands.           Impression       1. Findings seen on prior examination of neck soft tissue from January 24, 2019 seen posterior to the manubrium represents normal left   brachiocephalic vein.       2. No evidence of mediastinal or hilar lymphadenopathy.       3. Note made of a circumscribed, ovoid nodule seen in medial left   breast measures 17 x 9 mm possibly representing fibroadenoma. Correlation recommended. Ultrasound may be helpful for further   evaluation. Although CT and ultrasound imaging is not identical, the mass on CT has not increased in size. Patient is not symptomatic from the left breast fibroadenoma. She actually is more symptomatic from hormonal changes since her 3year-old son was born. She is having more symptomatic discomfort and nodularity in the right breast.  Patient will discuss a trial of oral contraceptives with her gynecologist.  Questions answered to patient satisfaction.       1/20/2020 - Right breast ultrasound - [facility]: ULTRASOUND FINDINGS:  Finding 1:  Sonography was performed in the right breast using a radial and anti-radial approach. No sonographic evidence of suspicious solid or cystic mass lesions. No focal areas of suspicious atypical echogenicity. IMPRESSION:  There is no sonographic evidence of malignancy. Screening mammogram at age 38 is recommended. All imaging reviewed and compared. Mass of breast tissue is slightly more prominent on today's imaging but continues to look benign. We discussed the fact that discomfort would likely resolve. We discussed that there are no contraindications to pregnancy at this point. Patient will follow us on a as needed basis and will get screening mammograms at age 38. Questions answered to patient satisfaction. Plan:       Plan:  1. Continue monthly breast self examination; detailed instructions reviewed today. Bring any changes to your physician's attention. 2. Continue healthy diet and exercise routinely as tolerated. 3. Avoid alcohol or limit alcohol intake to < 3 drinks per week. 4. Limit caffeine intake as possible. 5. Wear a good supportive bra at all times when awake. 6. Mammogram beginning at age 38.  9. Continue follow up with Primary Care and GYN. RTC if symptoms worsen or if new symptoms develop. OV PRN.

## 2020-01-20 NOTE — LETTER
[address] [address]  Phone: [phone]  Fax: [phone]    Beth Kennedy MD        January 20, 2020     [address] [facility] 227    Patient: Chuy Hopper  MR Number: [id number]  YOB: 1990  Date of Visit: 1/20/2020    Dear Dr. Miko Crouch:    Michel Nunez stopped in to see us today in follow-up of her right upper outer quadrant breast nodularity and tenderness. Below are the relevant portions of my assessment and plan of care. Assessment:  Linda Oseguera is an extremely pleasant 34 y.o. female with FH of breast cancer in paternal aunt in her early 52's (currently late 52's). She has a history of US core biopsy proven fibroadenoma done at [facility] in 2011. She presents today with C/O of increasing discomfort within the left breast in area of mass, primarily with her cycle.         Bilateral breast US 03/14/2018 @ [address] [place] [address] Dr Mariano     Left Ultrasound       HISTORY:   The patient has no personal history of cancer. The patient has the following family history of breast cancer:  paternal aunt, at age 48. 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       ULTRASOUND TECHNIQUE:   High-resolution real-time ultrasound scanning was performed. Additional elastography and doppler color flow analysis of any finding was also obtained.       COMPARISON:   No prior imaging studies are available for comparison.       ULTRASOUND FINDINGS:           Finding 1:   Sonography was performed in the left breast using a radial and anti-radial approach. There is an oval solid mass with partially defined margins measuring 13 x 6 x 8 mm seen in the left breast at 8 o'clock located 3 centimeters from the nipple.  Internal    echogenicity is hypoechoic.       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     It contains a clip which correlates with history of prior biopsy.       No sonographic evidence of suspicious solid or cystic mass lesions.  No focal areas of suspicious atypical echogenicity.       IMPRESSION:   Solid mass in the left breast is benign.       Screening mammogram at age 38 is recommended.       =======================================   BI-RADS Category 2:  Benign   =======================================      Right Ultrasound   HISTORY:   The patient has no personal history of cancer. The patient has the following family history of breast cancer:  paternal aunt, at age 48. 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       ULTRASOUND TECHNIQUE:   High-resolution real-time ultrasound scanning was performed. Additional elastography and doppler color flow analysis of any finding was also obtained.       COMPARISON:   No prior imaging studies are available for comparison.       ULTRASOUND FINDINGS:   Finding 1:   Sonography was performed in the right breast using a radial and anti-radial approach. No sonographic evidence of suspicious solid or cystic mass lesions. No focal areas of suspicious atypical echogenicity.       IMPRESSION:   There is no sonographic evidence of malignancy.       Screening mammogram at age 38 is recommended.       =======================================   BI-RADS Category 1:  Negative        9/24/18, Left breast ultrasound, [facility]:     ULTRASOUND FINDINGS:           Finding 1:   Sonography was performed in the left breast using a radial and anti-radial approach. There is a stable oval well circumscribed solid mass measuring 18 x 8 x 11 mm seen in the left breast at 8 o'clock located 5 centimeters from the nipple.  Internal    echogenicity is hypoechoic.       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       No sonographic evidence of suspicious solid or cystic mass lesions.  No focal areas of suspicious atypical echogenicity.     IMPRESSION:   Stable solid mass in the left breast is benign.       Screening mammogram at age 38 is recommended.       =======================================   BI-RADS Category 2:  Benign       9/24/18, Right breast ultrasound, [facility]:     ULTRASOUND FINDINGS:   US with color only.           Finding 1:   Sonography was performed in the right breast using a radial and anti-radial approach. No sonographic evidence of suspicious solid or cystic mass lesions. No focal areas of suspicious atypical echogenicity.       IMPRESSION:   There is no sonographic evidence of malignancy.       Screening mammogram at age 38 is recommended.       =======================================   BI-RADS Category 1:  Negative         1/24/19 - CT Soft Tissue Neck [facility]:     FINDINGS:   The parotid and submandibular glands appear normal in size and   attenuation with no focal lesions seen. The oropharynx and nasopharynx   appear normal. There is no evidence of tonsillar swelling. The   parapharyngeal spaces are intact. The epiglottis and aryepiglottic   folds appear normal. The thyroid gland is normal in size and enhances   homogeneously. The airway is patent. No thickening or nodularity is   seen in the region of the vocal cords.       There are scattered small bilateral cervical lymph nodes. There is a   digastric node on the right that measures 11 mm and a digastric node   on the left that measures 15 mm. There is a second node in the the   submandibular region between the submandibular gland and the left   sternocleidomastoid muscle measuring 10 mm. On the left there is a   similar node measuring 8 mm.  All other nodes within the neck measure   less than 1 cm in size.        In the anterior mediastinum/retrosternal area anterior and superior   to the aortic arch there is an ill-defined soft tissue density that   measures 2.7 x 1.8 cm which is most likely residual thymic tissue in this young patient. . This does not appear to be vascular. No other   enlarged lymph nodes are seen in the visible mediastinum or hilar   areas but these are not optimally evaluated. The cervical vessels are   patent bilaterally. The subcutaneous soft tissue and musculature   appear normal. The visible lung apices are expanded and clear. No   supraclavicular lymphadenopathy is seen.       The osseous structures appear unremarkable. The visible portions of   the brain appear unremarkable. The paranasal sinuses and mastoid air   cells are clear.       CONCLUSION:   1. A few mildly prominent cervical nodes are seen in the anterior   upper neck bilaterally as described above. These are considered   nonspecific. These are probably reactive. In the anterior   mediastinum/posterior to the manubrium sternum there is a 2.7 x 1.8 cm   ill-defined soft tissue density which could represent residual thymic   tissue in this young patient. If there is palpable lymphadenopathy   elsewhere, follow-up CT of the chest, abdomen and pelvis is   recommended for further evaluation. 2. Otherwise negative CT of the neck.               1/31/19 - CT [facility]:  FINDINGS:       Previously seen finding is seen at thoracic inlet posterior to the   manubrium on prior represents normal left brachiocephalic vein. No   evidence of mediastinal mass or evidence of lymphadenopathy. The heart   is normal in size. No airspace opacity or evidence of pleural   effusion. There is no evidence of suspicious lung nodule or evidence   of mass. No evidence of pneumothorax. No axillary lymphadenopathy. View of the upper abdomen shows normal bilateral adrenal glands.           Impression       1. Findings seen on prior examination of neck soft tissue from January 24, 2019 seen posterior to the manubrium represents normal left   brachiocephalic vein.       2.  No evidence of mediastinal or hilar lymphadenopathy.     3. Note made of a circumscribed, ovoid nodule seen in medial left   breast measures 17 x 9 mm possibly representing fibroadenoma. Correlation recommended. Ultrasound may be helpful for further   evaluation. Although CT and ultrasound imaging is not identical, the mass on CT has not increased in size. Patient is not symptomatic from the left breast fibroadenoma. She actually is more symptomatic from hormonal changes since her 3year-old son was born. She is having more symptomatic discomfort and nodularity in the right breast.  Patient will discuss a trial of oral contraceptives with her gynecologist.  Questions answered to patient satisfaction. 1/20/2020 - Right breast ultrasound - [facility]: ULTRASOUND FINDINGS:  Finding 1:  Sonography was performed in the right breast using a radial and anti-radial approach. No sonographic evidence of suspicious solid or cystic mass lesions. No focal areas of suspicious atypical echogenicity. IMPRESSION:  There is no sonographic evidence of malignancy. Screening mammogram at age 38 is recommended. All imaging reviewed and compared. Mass of breast tissue is slightly more prominent on today's imaging but continues to look benign. We discussed the fact that discomfort would likely resolve. We discussed that there are no contraindications to pregnancy at this point. Patient will follow us on a as needed basis and will get screening mammograms at age 38. Questions answered to patient satisfaction. Plan:       Plan:  1. Continue monthly breast self examination; detailed instructions reviewed today. Bring any changes to your physician's attention. 2. Continue healthy diet and exercise routinely as tolerated. 3. Avoid alcohol or limit alcohol intake to < 3 drinks per week. 4. Limit caffeine intake as possible. 5. Wear a good supportive bra at all times when awake. 6. Mammogram beginning at age 38.  9. Continue follow up with Primary Care and GYN.

## 2020-02-17 RX ORDER — CITALOPRAM 20 MG/1
TABLET ORAL
Qty: 30 TABLET | Refills: 5 | Status: SHIPPED
Start: 2020-02-17 | End: 2020-04-10 | Stop reason: SDUPTHER

## 2020-02-17 RX ORDER — LEVOTHYROXINE SODIUM 0.07 MG/1
TABLET ORAL
Qty: 30 TABLET | Refills: 1 | Status: SHIPPED
Start: 2020-02-17 | End: 2020-04-10 | Stop reason: SDUPTHER

## 2020-03-03 ENCOUNTER — TELEPHONE (OUTPATIENT)
Dept: CARDIOLOGY CLINIC | Age: 30
End: 2020-03-03

## 2020-03-03 NOTE — TELEPHONE ENCOUNTER
Pt. Says she has been having a lot of palpitations in the past 2 weeks but she also started working out recently,schedudled  earliest appt. 4/6, does she need any tests prior to visit?

## 2020-03-03 NOTE — TELEPHONE ENCOUNTER
Hydrate and electrolytes prior and after working out and keep scheduled appointment. Call sooner if it gets worse. Thuan Casanova D.O.   Cardiologist  Cardiology, [facility]

## 2020-03-10 RX ORDER — CLONAZEPAM 0.5 MG/1
0.5 TABLET ORAL 2 TIMES DAILY PRN
Qty: 60 TABLET | Refills: 2 | Status: SHIPPED
Start: 2020-03-10 | End: 2020-08-31

## 2020-03-10 NOTE — TELEPHONE ENCOUNTER
Pt requesting refill for Klonopin. She cancelled her appt today because she did not want to risk getting sick.   She has another appt in May but will run out of medication before then

## 2020-03-30 ENCOUNTER — E-VISIT (OUTPATIENT)
Dept: PRIMARY CARE CLINIC | Age: 30
End: 2020-03-30
Payer: COMMERCIAL

## 2020-03-30 PROCEDURE — 99422 OL DIG E/M SVC 11-20 MIN: CPT | Performed by: FAMILY MEDICINE

## 2020-03-30 RX ORDER — FAMOTIDINE 20 MG/1
20 TABLET, FILM COATED ORAL 2 TIMES DAILY
Qty: 60 TABLET | Refills: 0 | Status: SHIPPED
Start: 2020-03-30 | End: 2021-12-08

## 2020-04-01 ENCOUNTER — E-VISIT (OUTPATIENT)
Dept: PRIMARY CARE CLINIC | Age: 30
End: 2020-04-01
Payer: COMMERCIAL

## 2020-04-01 PROCEDURE — 99422 OL DIG E/M SVC 11-20 MIN: CPT | Performed by: FAMILY MEDICINE

## 2020-04-01 RX ORDER — DOXYCYCLINE HYCLATE 100 MG
100 TABLET ORAL 2 TIMES DAILY
Qty: 20 TABLET | Refills: 0 | Status: SHIPPED | OUTPATIENT
Start: 2020-04-01 | End: 2020-04-11

## 2020-04-01 ASSESSMENT — LIFESTYLE VARIABLES: SMOKING_STATUS: NO, I'VE NEVER SMOKED

## 2020-04-10 RX ORDER — CITALOPRAM 20 MG/1
TABLET ORAL
Qty: 90 TABLET | Refills: 1 | Status: SHIPPED
Start: 2020-04-10 | End: 2020-07-21

## 2020-04-10 RX ORDER — LEVOTHYROXINE SODIUM 0.07 MG/1
TABLET ORAL
Qty: 90 TABLET | Refills: 1 | Status: SHIPPED
Start: 2020-04-10 | End: 2020-07-06

## 2020-04-13 ENCOUNTER — OFFICE VISIT (OUTPATIENT)
Dept: PRIMARY CARE CLINIC | Age: 30
End: 2020-04-13
Payer: COMMERCIAL

## 2020-04-13 ENCOUNTER — HOSPITAL ENCOUNTER (OUTPATIENT)
Age: 30
Discharge: HOME | End: 2020-04-15
Payer: COMMERCIAL

## 2020-04-13 VITALS
HEIGHT: 63 IN | OXYGEN SATURATION: 98 % | WEIGHT: 178 LBS | BODY MASS INDEX: 31.54 KG/M2 | DIASTOLIC BLOOD PRESSURE: 82 MMHG | RESPIRATION RATE: 20 BRPM | SYSTOLIC BLOOD PRESSURE: 128 MMHG | TEMPERATURE: 98.3 F | HEART RATE: 72 BPM

## 2020-04-13 LAB
ALBUMIN SERPL-MCNC: 4.5 G/DL (ref 3.5–5.2)
ALP BLD-CCNC: 64 U/L (ref 35–104)
ALT SERPL-CCNC: 15 U/L (ref 0–32)
AMYLASE: 40 U/L (ref 20–100)
ANION GAP SERPL CALCULATED.3IONS-SCNC: 11 MMOL/L (ref 7–16)
AST SERPL-CCNC: 19 U/L (ref 0–31)
BASOPHILS ABSOLUTE: 0.06 E9/L (ref 0–0.2)
BASOPHILS RELATIVE PERCENT: 0.8 % (ref 0–2)
BILIRUB SERPL-MCNC: 0.2 MG/DL (ref 0–1.2)
BUN BLDV-MCNC: 12 MG/DL (ref 6–20)
CALCIUM SERPL-MCNC: 9.7 MG/DL (ref 8.6–10.2)
CHLORIDE BLD-SCNC: 102 MMOL/L (ref 98–107)
CO2: 26 MMOL/L (ref 22–29)
CREAT SERPL-MCNC: 0.5 MG/DL (ref 0.5–1)
EOSINOPHILS ABSOLUTE: 0.16 E9/L (ref 0.05–0.5)
EOSINOPHILS RELATIVE PERCENT: 2.3 % (ref 0–6)
GFR AFRICAN AMERICAN: >60
GFR NON-AFRICAN AMERICAN: >60 ML/MIN/1.73
GLUCOSE BLD-MCNC: 99 MG/DL (ref 74–99)
HCT VFR BLD CALC: 39.3 % (ref 34–48)
HEMOGLOBIN: 12.8 G/DL (ref 11.5–15.5)
IMMATURE GRANULOCYTES #: 0.02 E9/L
IMMATURE GRANULOCYTES %: 0.3 % (ref 0–5)
LIPASE: 27 U/L (ref 13–60)
LYMPHOCYTES ABSOLUTE: 2.11 E9/L (ref 1.5–4)
LYMPHOCYTES RELATIVE PERCENT: 29.7 % (ref 20–42)
MCH RBC QN AUTO: 29 PG (ref 26–35)
MCHC RBC AUTO-ENTMCNC: 32.6 % (ref 32–34.5)
MCV RBC AUTO: 89.1 FL (ref 80–99.9)
MONOCYTES ABSOLUTE: 0.63 E9/L (ref 0.1–0.95)
MONOCYTES RELATIVE PERCENT: 8.9 % (ref 2–12)
NEUTROPHILS ABSOLUTE: 4.13 E9/L (ref 1.8–7.3)
NEUTROPHILS RELATIVE PERCENT: 58 % (ref 43–80)
PDW BLD-RTO: 11.9 FL (ref 11.5–15)
PLATELET # BLD: 175 E9/L (ref 130–450)
PMV BLD AUTO: 11.8 FL (ref 7–12)
POTASSIUM SERPL-SCNC: 4.6 MMOL/L (ref 3.5–5)
RBC # BLD: 4.41 E12/L (ref 3.5–5.5)
SODIUM BLD-SCNC: 139 MMOL/L (ref 132–146)
TOTAL PROTEIN: 8.1 G/DL (ref 6.4–8.3)
WBC # BLD: 7.1 E9/L (ref 4.5–11.5)

## 2020-04-13 PROCEDURE — 99214 OFFICE O/P EST MOD 30 MIN: CPT | Performed by: FAMILY MEDICINE

## 2020-04-13 PROCEDURE — 80053 COMPREHEN METABOLIC PANEL: CPT

## 2020-04-13 PROCEDURE — 85025 COMPLETE CBC W/AUTO DIFF WBC: CPT

## 2020-04-13 PROCEDURE — 36415 COLL VENOUS BLD VENIPUNCTURE: CPT

## 2020-04-13 PROCEDURE — 82150 ASSAY OF AMYLASE: CPT

## 2020-04-13 PROCEDURE — 83690 ASSAY OF LIPASE: CPT

## 2020-04-13 ASSESSMENT — ENCOUNTER SYMPTOMS
SHORTNESS OF BREATH: 0
VOMITING: 0
COUGH: 0
EYE ITCHING: 0
BLOOD IN STOOL: 0
BELCHING: 1
RHINORRHEA: 0
APNEA: 0
ABDOMINAL PAIN: 1
EYE REDNESS: 0
COLOR CHANGE: 0
CONSTIPATION: 0
FLATUS: 0
CHEST TIGHTNESS: 0
NAUSEA: 1
BACK PAIN: 0
EYE PAIN: 0
DIARRHEA: 0
SORE THROAT: 0
WHEEZING: 0
SINUS PRESSURE: 0

## 2020-04-13 ASSESSMENT — PATIENT HEALTH QUESTIONNAIRE - PHQ9
2. FEELING DOWN, DEPRESSED OR HOPELESS: 0
SUM OF ALL RESPONSES TO PHQ QUESTIONS 1-9: 0
SUM OF ALL RESPONSES TO PHQ9 QUESTIONS 1 & 2: 0
1. LITTLE INTEREST OR PLEASURE IN DOING THINGS: 0
SUM OF ALL RESPONSES TO PHQ QUESTIONS 1-9: 0

## 2020-04-13 NOTE — PROGRESS NOTES
connections     Talks on phone: None     Gets together: None     Attends Anglican service: None     Active member of club or organization: None     Attends meetings of clubs or organizations: None     Relationship status: None    Intimate partner violence     Fear of current or ex partner: None     Emotionally abused: None     Physically abused: None     Forced sexual activity: None   Other Topics Concern    None   Social History Narrative    None       Family History   Problem Relation Age of Onset    Hypertension Mother         pulmonary hypertension     High Blood Pressure Mother     Other Mother     Heart Disease Father     Kidney Disease Father     Hypertension Father         IBS & polyps    Other Sister         gastric ulcer & IBS    Cancer Paternal Aunt 46        breast          Review of Systems   Constitutional: Negative for activity change, appetite change, fatigue, fever and weight loss. HENT: Negative for congestion, ear pain, hearing loss, nosebleeds, rhinorrhea, sinus pressure and sore throat. Eyes: Negative for pain, redness, itching and visual disturbance. Respiratory: Negative for apnea, cough, chest tightness, shortness of breath and wheezing. Cardiovascular: Negative for chest pain, palpitations and leg swelling. Gastrointestinal: Positive for abdominal pain and nausea. Negative for blood in stool, constipation, diarrhea, flatus and vomiting. Endocrine: Negative. Genitourinary: Negative for decreased urine volume, difficulty urinating, dysuria, frequency, hematuria and urgency. Musculoskeletal: Negative for arthralgias, back pain, gait problem, myalgias and neck pain. Skin: Negative for color change and rash. Allergic/Immunologic: Negative for environmental allergies and food allergies. Neurological: Negative for dizziness, weakness, light-headedness, numbness and headaches. Hematological: Negative for adenopathy. Does not bruise/bleed easily. present. Musculoskeletal: Normal range of motion. General: No tenderness. Lymphadenopathy:      Cervical: No cervical adenopathy. Skin:     General: Skin is warm and dry. Findings: No erythema or rash. Neurological:      General: No focal deficit present. Mental Status: She is alert and oriented to person, place, and time. Cranial Nerves: No cranial nerve deficit. Deep Tendon Reflexes: Reflexes are normal and symmetric. Reflexes normal.   Psychiatric:         Mood and Affect: Mood normal.                                 ASSESSMENT/PLAN:    Patient Active Problem List   Diagnosis    Fatigue    Acquired hypothyroidism    Vitamin D deficiency    Gastroesophageal reflux disease without esophagitis    S/P tonsillectomy    Myalgia    Anxiety    Breast mass, left       Merradith was seen today for abdominal pain. Diagnoses and all orders for this visit:    RUQ abdominal pain  -     US Gallbladder Ruq; Future  -     CBC Auto Differential; Future  -     Comprehensive Metabolic Panel; Future  -     Amylase; Future  -     Lipase; Future      Likely biliary colic/cholelithiasis  Counseled on warning signs for potential ED disposition  Has appt with Dr. Ramon Vanessa soon    Return if symptoms worsen or fail to improve. I spent 25 minutes with this patient. I spent greater than 50% of the time counseling this patient.         Be Sage DO  4/13/2020  2:28 PM

## 2020-04-14 ENCOUNTER — TELEPHONE (OUTPATIENT)
Dept: PRIMARY CARE CLINIC | Age: 30
End: 2020-04-14

## 2020-05-21 ENCOUNTER — VIRTUAL VISIT (OUTPATIENT)
Dept: PRIMARY CARE CLINIC | Age: 30
End: 2020-05-21
Payer: COMMERCIAL

## 2020-05-21 VITALS — WEIGHT: 171.7 LBS | HEIGHT: 63 IN | BODY MASS INDEX: 30.42 KG/M2

## 2020-05-21 PROCEDURE — 99213 OFFICE O/P EST LOW 20 MIN: CPT | Performed by: FAMILY MEDICINE

## 2020-05-21 RX ORDER — MULTIVIT WITH MINERALS/LUTEIN
1000 TABLET ORAL DAILY
COMMUNITY

## 2020-05-21 ASSESSMENT — ENCOUNTER SYMPTOMS
EYE PAIN: 0
CHANGE IN BOWEL HABIT: 0
NAUSEA: 0
BACK PAIN: 0
BLOOD IN STOOL: 0
COLOR CHANGE: 0
ABDOMINAL PAIN: 0
VISUAL CHANGE: 0
SINUS PRESSURE: 0
SHORTNESS OF BREATH: 0
EYE ITCHING: 0
RHINORRHEA: 0
WHEEZING: 0
CONSTIPATION: 0
EYE REDNESS: 0
CHEST TIGHTNESS: 0
DIARRHEA: 0
SORE THROAT: 0
COUGH: 0
VOMITING: 0
APNEA: 0

## 2020-05-21 NOTE — PROGRESS NOTES
10 MG tablet Take 10 mg by mouth daily      clonazePAM (KLONOPIN) 0.5 MG tablet Take 1 tablet by mouth 2 times daily as needed for Anxiety for up to 30 days. 60 tablet 2     No current facility-administered medications for this visit.         Allergies   Allergen Reactions    Cefdinir Hives     Had reaction as a child    Lomedia 24 Fe [Norethin Ace-Eth Estrad-Fe] Hives    Sulfa Antibiotics Hives     Joint swell       Social History     Socioeconomic History    Marital status:      Spouse name: None    Number of children: None    Years of education: None    Highest education level: None   Occupational History     Employer: [employer]     Employer: [employer]   Social Needs    Financial resource strain: None    Food insecurity     Worry: None     Inability: None    Transportation needs     Medical: None     Non-medical: None   Tobacco Use    Smoking status: Never Smoker    Smokeless tobacco: Never Used   Substance and Sexual Activity    Alcohol use: Yes     Comment: wine once in a while    Drug use: No    Sexual activity: None   Lifestyle    Physical activity     Days per week: None     Minutes per session: None    Stress: None   Relationships    Social connections     Talks on phone: None     Gets together: None     Attends Confucianism service: None     Active member of club or organization: None     Attends meetings of clubs or organizations: None     Relationship status: None    Intimate partner violence     Fear of current or ex partner: None     Emotionally abused: None     Physically abused: None     Forced sexual activity: None   Other Topics Concern    None   Social History Narrative    None       Family History   Problem Relation Age of Onset    Hypertension Mother         pulmonary hypertension     High Blood Pressure Mother     Other Mother     Heart Disease Father     Kidney Disease Father     Hypertension Father         IBS & polyps    Other Sister gastric ulcer & IBS    Cancer Paternal Aunt 46        breast          Review of Systems   Constitutional: Negative for activity change, appetite change, chills, diaphoresis, fatigue and fever. HENT: Negative for congestion, ear pain, hearing loss, nosebleeds, rhinorrhea, sinus pressure and sore throat. Eyes: Negative for pain, redness, itching and visual disturbance. Respiratory: Negative for apnea, cough, chest tightness, shortness of breath and wheezing. Cardiovascular: Negative for chest pain, palpitations and leg swelling. Gastrointestinal: Negative for abdominal pain, blood in stool, change in bowel habit, constipation, diarrhea, nausea and vomiting. Endocrine: Negative. Genitourinary: Negative for decreased urine volume, difficulty urinating, dysuria, frequency, hematuria and urgency. Musculoskeletal: Negative for arthralgias, back pain, gait problem, joint swelling, myalgias and neck pain. Skin: Negative for color change and rash. Allergic/Immunologic: Negative for environmental allergies and food allergies. Neurological: Negative for dizziness, vertigo, weakness, light-headedness, numbness and headaches. Hematological: Negative for adenopathy. Does not bruise/bleed easily. Psychiatric/Behavioral: Negative for behavioral problems, dysphoric mood and sleep disturbance. The patient is not nervous/anxious and is not hyperactive. All other systems reviewed and are negative. Ht 5' 3\" (1.6 m)   Wt 171 lb 11.2 oz (77.9 kg)   BMI 30.42 kg/m²     Physical Exam  Vitals signs and nursing note reviewed. Constitutional:       General: She is not in acute distress. Appearance: Normal appearance. She is not ill-appearing. Neck:      Thyroid: No thyroid mass (per inspection only) or thyromegaly (per inspection only). Pulmonary:      Effort: Pulmonary effort is normal. No respiratory distress.    Neurological:      Mental Status: She is alert and oriented to person, place,

## 2020-05-28 ENCOUNTER — HOSPITAL ENCOUNTER (OUTPATIENT)
Age: 30
Discharge: HOME | End: 2020-05-30
Payer: COMMERCIAL

## 2020-05-28 LAB
ALBUMIN SERPL-MCNC: 4.6 G/DL (ref 3.5–5.2)
ALP BLD-CCNC: 65 U/L (ref 35–104)
ALT SERPL-CCNC: 15 U/L (ref 0–32)
ANION GAP SERPL CALCULATED.3IONS-SCNC: 13 MMOL/L (ref 7–16)
AST SERPL-CCNC: 20 U/L (ref 0–31)
BILIRUB SERPL-MCNC: 0.2 MG/DL (ref 0–1.2)
BUN BLDV-MCNC: 13 MG/DL (ref 6–20)
CALCIUM SERPL-MCNC: 9.5 MG/DL (ref 8.6–10.2)
CHLORIDE BLD-SCNC: 102 MMOL/L (ref 98–107)
CHOLESTEROL, TOTAL: 239 MG/DL (ref 0–199)
CO2: 25 MMOL/L (ref 22–29)
CREAT SERPL-MCNC: 0.6 MG/DL (ref 0.5–1)
GFR AFRICAN AMERICAN: >60
GFR NON-AFRICAN AMERICAN: >60 ML/MIN/1.73
GLUCOSE BLD-MCNC: 99 MG/DL (ref 74–99)
HCT VFR BLD CALC: 40.7 % (ref 34–48)
HDLC SERPL-MCNC: 43 MG/DL
HEMOGLOBIN: 12.8 G/DL (ref 11.5–15.5)
LDL CHOLESTEROL CALCULATED: 173 MG/DL (ref 0–99)
MCH RBC QN AUTO: 28.8 PG (ref 26–35)
MCHC RBC AUTO-ENTMCNC: 31.4 % (ref 32–34.5)
MCV RBC AUTO: 91.7 FL (ref 80–99.9)
PDW BLD-RTO: 11.9 FL (ref 11.5–15)
PLATELET # BLD: 226 E9/L (ref 130–450)
PMV BLD AUTO: 11.6 FL (ref 7–12)
POTASSIUM SERPL-SCNC: 4.1 MMOL/L (ref 3.5–5)
RBC # BLD: 4.44 E12/L (ref 3.5–5.5)
SODIUM BLD-SCNC: 140 MMOL/L (ref 132–146)
TOTAL PROTEIN: 7.7 G/DL (ref 6.4–8.3)
TRIGL SERPL-MCNC: 116 MG/DL (ref 0–149)
TSH SERPL DL<=0.05 MIU/L-ACNC: 1.73 UIU/ML (ref 0.27–4.2)
VITAMIN D 25-HYDROXY: 32 NG/ML (ref 30–100)
VLDLC SERPL CALC-MCNC: 23 MG/DL
WBC # BLD: 6.6 E9/L (ref 4.5–11.5)

## 2020-05-28 PROCEDURE — 85027 COMPLETE CBC AUTOMATED: CPT

## 2020-05-28 PROCEDURE — 80053 COMPREHEN METABOLIC PANEL: CPT

## 2020-05-28 PROCEDURE — 36415 COLL VENOUS BLD VENIPUNCTURE: CPT

## 2020-05-28 PROCEDURE — 80061 LIPID PANEL: CPT

## 2020-05-28 PROCEDURE — 84443 ASSAY THYROID STIM HORMONE: CPT

## 2020-05-28 PROCEDURE — 82306 VITAMIN D 25 HYDROXY: CPT

## 2020-05-29 ENCOUNTER — TELEPHONE (OUTPATIENT)
Dept: PRIMARY CARE CLINIC | Age: 30
End: 2020-05-29

## 2020-06-16 ENCOUNTER — TELEPHONE (OUTPATIENT)
Dept: CARDIOLOGY CLINIC | Age: 30
End: 2020-06-16

## 2020-07-06 RX ORDER — LEVOTHYROXINE SODIUM 0.07 MG/1
TABLET ORAL
Qty: 30 TABLET | Refills: 3 | Status: SHIPPED
Start: 2020-07-06 | End: 2020-11-13

## 2020-07-07 NOTE — TELEPHONE ENCOUNTER
4/1/19 notified patient by phone
Patient calling, went to urgent care last night and was told she had asthmatic bronchitis. She is coughing up green, has chest congestion and some shortness of breath. They did not give her an antibiotic and would like to know if you could call something in or if she needed to be seen. Would also like script for diflucan just in case as she usually gets a yeast infection with antibiotics.
Rx sent in
Yes - the patient is able to be screened

## 2020-07-21 ENCOUNTER — TELEPHONE (OUTPATIENT)
Dept: PRIMARY CARE CLINIC | Age: 30
End: 2020-07-21

## 2020-07-21 RX ORDER — CITALOPRAM 40 MG/1
40 TABLET ORAL DAILY
Qty: 90 TABLET | Refills: 1 | Status: SHIPPED
Start: 2020-07-21 | End: 2021-01-14

## 2020-07-21 NOTE — TELEPHONE ENCOUNTER
Patient calling states that her celexa 20mg was working and now all of a sudden notices that her anxiety is getting worse. Wasn't sure if medication could be increased or if medication should be changed. She has klonopin but does not like to take it unless absolutely needed.

## 2020-07-29 ENCOUNTER — TELEPHONE (OUTPATIENT)
Dept: PRIMARY CARE CLINIC | Age: 30
End: 2020-07-29

## 2020-07-29 RX ORDER — FLUCONAZOLE 100 MG/1
100 TABLET ORAL DAILY
Qty: 3 TABLET | Refills: 0 | Status: SHIPPED | OUTPATIENT
Start: 2020-07-29 | End: 2020-08-01

## 2020-07-29 NOTE — TELEPHONE ENCOUNTER
Patient calling into the office stating she has had a yeast infection x2 days. She is asking if pcp will call in a medication for her to [facility]. Please advise.

## 2020-08-31 RX ORDER — CLONAZEPAM 0.5 MG/1
TABLET ORAL
Qty: 60 TABLET | Refills: 0 | Status: SHIPPED
Start: 2020-08-31 | End: 2020-11-13

## 2020-10-21 NOTE — PROGRESS NOTES
Subjective:      Patient ID: Freedom Chisholm is a 31 y.o. female. HPI  History and Physical    Patient's Name/Date of Birth: Freedom Chisholm / 1990    Date:October 27, 2020    Freedom Chisholm presents for evaluation of a Right breast lesion    PCP: Chapito Karimi DO. Gynecologist:***. The lesion is located in the upper inner quadrant position of the Right breast. The lesion was discovered by exam. The patient has noted a change in BSE since presentation. Patient {denies/admits to:5300} nipple discharge. Patient {denies/admits to:5300} a personal history of breast cancer. Breast cancer risk factors include paternal aunt breast cancer,{breast ca risks:09568}. Ashken[place] Ancestry: {YES/NO:19726}. OBSTETRIC RELATED HISTORY:  Age of menarche was {numbers; 8-17:92589}. Age of menopause was {numbers; 0-5:232971}{numbers; 0-9:82227}. Patient {Actions; denies/admits to:5300} hormonal therapy. Patient is G{numbers; 0-10:38785}P{numbers; 0-56:38841}. Age of first live birth was {numbers; 0-5:576725}{numbers; 0-9:98362}. Patient {Desc; did/not:68728} breast feed. Is patient interested in fertility information about fertility preservation? {YES/NO:19726}    CANCER SURVEILLANCE HISTORY:  Mammograms: No   Breast MRI's: {YES/NO/NA:447677874}   Breast Biopsies: {YES/NO/NA:030419982}   Colonoscopy: Yes 5/2019  GI Polyps: No   EGD: Yes 5/2019  Pelvic Exam: {YES/NO/NA:021352404}   Pap Smear: {YES/NO/NA:867276370}   Dermatology: {YES/NO/NA:283332478}   Lung screening: {YES/NO:19670}        Estimated body mass index is 30.42 kg/m² as calculated from the following:    Height as of 5/21/20: 5' 3\" (1.6 m). Weight as of 5/21/20: 171 lb 11.2 oz (77.9 kg).   Bra Size: ***    Because violence is so common, we ask all our patients: are you in a relationship or do you live with a person who threatens, hurts, or controls you:  ***    Patient drinks {DESC; NO/LITTLE/MODERATE/SIGNIFICANT:66542} caffeinated beverages. Patient does not smoke cigarettes. Patient does not use recreational drugs. Past Medical History:   Diagnosis Date    Asthma     excersise induced and allergy induced    Enlarged lymph node     at left neck, to have a bx 2018    Environmental allergies     IBS (irritable bowel syndrome)     Mitral valve prolapse     no regurgitation, with anxiety or caffeine developes palpitations,followed with Dr. Demar Clarke, has appt with Dr. Marius Burkitt 2018    Palpitations 2014    caffeine induced    Sore throat     frequent    Thyroid disease        Past Surgical History:   Procedure Laterality Date     SECTION      2 children     COLONOSCOPY  16    DENTAL SURGERY      extractions    CO BIOPSY OROPHARYNX Left 2018    LEFT TONGUE  BIOPSY performed by Darrell Olivo MD at [place] [address] OF TONSILS,12+ Y/O N/A 2018    TONSILLECTOMY performed by Darrell Olivo MD at [address] [place] [id number] N/A 2018    TONSILLECTOMY POSTOP HEMORRHAGE CONTROL performed [facility] at [facility] OR       Current Outpatient Medications   Medication Sig Dispense Refill    clonazePAM (KLONOPIN) 0.5 MG tablet take 1 tablet by mouth twice a day if needed for anxiety 60 tablet 0    citalopram (CELEXA) 40 MG tablet Take 1 tablet by mouth daily 90 tablet 1    levothyroxine (SYNTHROID) 75 MCG tablet take 1 tablet by mouth once daily 30 tablet 3    Cholecalciferol 100 MCG (4000 UT) CAPS Take by mouth      Ascorbic Acid (VITAMIN C) 1000 MG tablet Take 1,000 mg by mouth daily      famotidine (PEPCID) 20 MG tablet Take 1 tablet by mouth 2 times daily 60 tablet 0    albuterol sulfate HFA (PROAIR HFA) 108 (90 Base) MCG/ACT inhaler Inhale 2 puffs into the lungs every 6 hours as needed for Wheezing 1 Inhaler 3    loratadine (CLARITIN) 10 MG tablet Take 10 mg by mouth daily       No current facility-administered medications for this visit. Allergies   Allergen Reactions    Cefdinir Hives     Had reaction as a child    Lomedia 24 Fe [Norethin Ace-Eth Estrad-Fe] Hives    Sulfa Antibiotics Hives     Joint swell       Family History   Problem Relation Age of Onset    Hypertension Mother         pulmonary hypertension     High Blood Pressure Mother     Other Mother     Heart Disease Father     Kidney Disease Father     Hypertension Father         IBS & polyps    Other Sister         gastric ulcer & IBS    Cancer Paternal Aunt 46        breast       Social History     Socioeconomic History    Marital status:      Spouse name: Not on file    Number of children: Not on file    Years of education: Not on file    Highest education level: Not on file   Occupational History     Employer: [employer]      Employer: [employer] resource strain: Not on file    Food insecurity     Worry: Not on file     Inability: Not on file    Transportation needs     Medical: Not on file     Non-medical: Not on file   Tobacco Use    Smoking status: Never Smoker    Smokeless tobacco: Never Used   Substance and Sexual Activity    Alcohol use: Yes     Comment: wine once in a while    Drug use: No    Sexual activity: Not on file   Lifestyle    Physical activity     Days per week: Not on file     Minutes per session: Not on file    Stress: Not on file   Relationships    Social connections     Talks on phone: Not on file     Gets together: Not on file     Attends Mu-ism service: Not on file     Active member of club or organization: Not on file     Attends meetings of clubs or organizations: Not on file     Relationship status: Not on file    Intimate partner violence     Fear of current or ex partner: Not on file     Emotionally abused: Not on file     Physically abused: Not on file     Forced sexual activity: Not on file   Other Topics Concern    Not on file   Social History Narrative    Not on file       Occupation: ***    Review of Systems    Objective:   Physical Exam    Assessment:      31 y.o. woman who underwent ***.             10/27/2020;RIGHT BREAST ULTRASOUND; [facility]***      Plan:      1. Continue monthly breast self examination; detailed instructions reviewed today. Bring any changes to your physician's attention. 2. Continue healthy diet and exercise routinely as tolerated. 3. Avoid alcohol. 4. Limit caffeine intake. 5. Repeat mammogram ***. 6. Continue follow up with Primary Care. 7. RTC 6 months. During today's visit, face-to-face time *** minutes, greater than 50% in counseling education and coordination of care. All questions were answered to her apparent satisfaction, and she is agreeable to the plan as outlined above. I personally and independently saw and examined patient and reviewed all pertinent laboratory data and imaging studies. I have reviewed and agree with the CNP history and review of systems as documented in the above note. This document is generated, in part, by voice recognition software and thus syntax and grammatical errors are possible. MD Regla Salomon 132  October 27, 2020

## 2020-10-22 ASSESSMENT — ENCOUNTER SYMPTOMS
ABDOMINAL PAIN: 0
TROUBLE SWALLOWING: 0
SINUS PAIN: 0
DIARRHEA: 0
CHEST TIGHTNESS: 0
SORE THROAT: 0
VOMITING: 0
VOICE CHANGE: 0
ABDOMINAL DISTENTION: 0
COUGH: 0
RHINORRHEA: 0
BLOOD IN STOOL: 0
CONSTIPATION: 0
NAUSEA: 0
SHORTNESS OF BREATH: 0
BACK PAIN: 0
WHEEZING: 0
SINUS PRESSURE: 0
CHOKING: 0
EYE ITCHING: 0
EYE DISCHARGE: 0

## 2020-10-22 NOTE — PROGRESS NOTES
Subjective:  oval solid mass with partially defined margins measuring 17 x 6 x 9 mm on recent CT scan seen in the left breast at 8 o'clock located 3 centimeters from the nipple. This note was copied forward from the last encounter. Essential components for this patient record were reviewed and verified on this visit including:  recent hospitalizations, recent imaging, PMH, PSH, FH, SOC HX, Allergies, and Medications were reviewed and updated as appropriate. In addition, the assessment and plan were copied from prior office note and updated accordingly. Patient ID: Jeremias Dey is a 31 y.o. female. HPI     Date: 2020    [facility] THE [facility] presents for evaluation of tender right breast upper outer quadrant nodularity that has changed in size.     PCP: Torri Gill DO. Gynecologist: Dr. Carson Dee     The masses are located in the upper outer quadrant right breast. Has been lifting weights regularly. The patient has noted increased size/comfort of the mass since presentation. The masses are mobile and tender, especially around her menstrual periods. She describes pain radiating from the masses to the axilla, and occasional pain radiating up to her neck. All symptoms are worse around the time of her cycle. Patient does not routinely do self breast exams. Patient denies nipple discharge. Patient has  previous breast biopsy 2011 @ [facility] (Benign). Patient denies a personal history of breast cancer. Breast cancer risk factors include Paternal aunt with breast cancer in her early 52's;  Currently age late 52's   Age of menarche was 8. Patient is . Age of first live birth was 23. Patient did breast feed.     Estimated body mass index is 29.41 kg/m² as calculated from the following:    Height as of 18: 5' 3\" (1.6 m). Weight as of 18: 166 lb (75.3 kg).   Bra Size: 38D        Bilateral breast US 2018 @ [address] [place] [address] Dr Mariano  Left Ultrasound   HISTORY:   The patient has no personal history of cancer. The patient has the following family history of breast cancer:  paternal aunt, at age 48. 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       ULTRASOUND TECHNIQUE:   COMPARISON:   No prior imaging studies are available for comparison.       ULTRASOUND FINDINGS:   Finding 1:   Sonography was performed in the left breast using a radial and anti-radial approach. There is an oval solid mass with partially defined margins measuring 13 x 6 x 8 mm seen in the left breast at 8 o'clock located 3 centimeters from the nipple. Internal    echogenicity is hypoechoic.       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       It contains a clip which correlates with history of prior biopsy.       No sonographic evidence of suspicious solid or cystic mass lesions.  No focal areas of suspicious atypical echogenicity.       IMPRESSION:   Solid mass in the left breast is benign.       Screening mammogram at age 38 is recommended.       =======================================   BI-RADS Category 2:  Benign   =======================================        Right Ultrasound       HISTORY:   The patient has no personal history of cancer. The patient has the following family history of breast cancer:  paternal aunt, at age 48. 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       ULTRASOUND TECHNIQUE:   High-resolution real-time ultrasound scanning was performed. Additional elastography and doppler color flow analysis of any finding was also obtained.       COMPARISON:   No prior imaging studies are available for comparison.       ULTRASOUND FINDINGS:           Finding 1:   Sonography was performed in the right breast using a radial and anti-radial approach. No sonographic evidence of suspicious solid or cystic mass lesions.  No focal areas of suspicious atypical echogenicity.       IMPRESSION:   There is no sonographic evidence of malignancy.       Screening mammogram at age 38 is recommended.       =======================================   BI-RADS Category 1:  Negative             9/24/18, Left breast ultrasound, [facility]:  ULTRASOUND FINDINGS:   Finding 1:   Sonography was performed in the left breast using a radial and anti-radial approach. There is a stable oval well circumscribed solid mass measuring 18 x 8 x 11 mm seen in the left breast at 8 o'clock located 5 centimeters from the nipple. Internal    echogenicity is hypoechoic.       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       No sonographic evidence of suspicious solid or cystic mass lesions.  No focal areas of suspicious atypical echogenicity.       IMPRESSION:   Stable solid mass in the left breast is benign.       Screening mammogram at age 38 is recommended.       =======================================   BI-RADS Category 2:  Benign       9/24/18, Right breast ultrasound, [facility]:     ULTRASOUND FINDINGS:   US with color only.           Finding 1:   Sonography was performed in the right breast using a radial and anti-radial approach. No sonographic evidence of suspicious solid or cystic mass lesions. No focal areas of suspicious atypical echogenicity.       IMPRESSION:   There is no sonographic evidence of malignancy.       Screening mammogram at age 38 is recommended.       =======================================   BI-RADS Category 1:  Negative       1/24/19 - CT Soft Tissue Neck [facility]:     FINDINGS:   The parotid and submandibular glands appear normal in size and   attenuation with no focal lesions seen. The oropharynx and nasopharynx   appear normal. There is no evidence of tonsillar swelling. The   parapharyngeal spaces are intact. The epiglottis and aryepiglottic   folds appear normal. The thyroid gland is normal in size and enhances   homogeneously. The airway is patent.  No thickening or nodularity is   seen in the region of the vocal cords.       There are scattered small bilateral cervical lymph nodes. There is a   digastric node on the right that measures 11 mm and a digastric node   on the left that measures 15 mm. There is a second node in the the   submandibular region between the submandibular gland and the left   sternocleidomastoid muscle measuring 10 mm. On the left there is a   similar node measuring 8 mm. All other nodes within the neck measure   less than 1 cm in size.        In the anterior mediastinum/retrosternal area anterior and superior   to the aortic arch there is an ill-defined soft tissue density that   measures 2.7 x 1.8 cm which is most likely residual thymic tissue in   this young patient. . This does not appear to be vascular. No other   enlarged lymph nodes are seen in the visible mediastinum or hilar   areas but these are not optimally evaluated. The cervical vessels are   patent bilaterally. The subcutaneous soft tissue and musculature   appear normal. The visible lung apices are expanded and clear. No   supraclavicular lymphadenopathy is seen.       The osseous structures appear unremarkable. The visible portions of   the brain appear unremarkable. The paranasal sinuses and mastoid air   cells are clear.       CONCLUSION:   1. A few mildly prominent cervical nodes are seen in the anterior   upper neck bilaterally as described above. These are considered   nonspecific. These are probably reactive. In the anterior   mediastinum/posterior to the manubrium sternum there is a 2.7 x 1.8 cm   ill-defined soft tissue density which could represent residual thymic   tissue in this young patient. If there is palpable lymphadenopathy   elsewhere, follow-up CT of the chest, abdomen and pelvis is   recommended for further evaluation.    2. Otherwise negative CT of the neck.               1/31/19 - CT Chest - [facility]:  FINDINGS:       Previously seen finding is seen at thoracic inlet posterior to the   manubrium on prior represents normal left brachiocephalic vein. No   evidence of mediastinal mass or evidence of lymphadenopathy. The heart   is normal in size. No airspace opacity or evidence of pleural   effusion. There is no evidence of suspicious lung nodule or evidence   of mass. No evidence of pneumothorax. No axillary lymphadenopathy. View of the upper abdomen shows normal bilateral adrenal glands.           Impression       1. Findings seen on prior examination of neck soft tissue from January 24, 2019 seen posterior to the manubrium represents normal left   brachiocephalic vein.       2. No evidence of mediastinal or hilar lymphadenopathy.       3. Note made of a circumscribed, ovoid nodule seen in medial left   breast measures 17 x 9 mm possibly representing fibroadenoma. Correlation recommended. Ultrasound may be helpful for further   evaluation. 11/29/18 tonsillectomy, tongue biopsy; benign. Diagnosis:  A.  Bilateral tonsils, tonsillectomy: Tonsillar tissue with reactive  follicular lymphoid hyperplasia. Aggregates of bacteria morphologically compatible with Actinomyces  species are present within tonsillar crypts of one tonsil. B.  Left tongue, biopsy: Lingual tonsil tissue with reactive follicular  lymphoid hyperplasia. Complicated by re-bleed X2. Since son born 2 years ago having severe PMS, breast changes (R>L), acne, nipple sensitivity. 1/20/2020 - Right breast ultrasound - [facility]:   ULTRASOUND FINDINGS:  Finding 1:  Sonography was performed in the right breast using a radial and anti-radial approach. No sonographic evidence of suspicious solid or cystic mass lesions. No focal areas of suspicious atypical echogenicity. IMPRESSION:  There is no sonographic evidence of malignancy. Screening mammogram at age 38 is recommended. All imaging reviewed with patient. Ultrasound from March 14, 2018 compared to imaging from today.   Persistent nodular breast tissue which appears benign. 10/27/20 US  ULTRASOUND FINDINGS:              Finding 1:    Sonography was performed in the right breast using a radial and anti-radial approach. No sonographic evidence of suspicious solid or cystic mass lesions. No focal areas of suspicious atypical echogenicity.         IMPRESSION:    There is no sonographic evidence of malignancy.         Screening mammogram at age 38 is recommended. Ultrasound reviewed with patient. Dense, age-appropriate breast tissue and overlying fatty tissue likely contributing to patient's tender nodularity.     Past Medical History:   Diagnosis Date    Asthma     excersise induced and allergy induced    Enlarged lymph node     at left neck, to have a bx 2018    Environmental allergies     IBS (irritable bowel syndrome)     Mitral valve prolapse     no regurgitation, with anxiety or caffeine developes palpitations,followed with Dr. Evie Lawson, has appt with Dr. Keshia Calros 2018    Palpitations 2014    caffeine induced    Sore throat     frequent    Thyroid disease      Past Surgical History:   Procedure Laterality Date     SECTION      2 children     COLONOSCOPY  16    DENTAL SURGERY      extractions    ID BIOPSY OROPHARYNX Left 2018    LEFT TONGUE  BIOPSY performed by Benedict Gaxiola MD at [place] [address] OF TONSILS,12+ Y/O N/A 2018    TONSILLECTOMY performed by Benedict Gaxiola MD at [facility] N/A 2018    TONSILLECTOMY POSTOP HEMORRHAGE CONTROL performed by Benedict Gaxiola MD at [address] [place] [address] History     Socioeconomic History    Marital status:      Spouse name: Not on file    Number of children: Not on file    Years of education: Not on file    Highest education level: Not on file   Occupational History     Employer: [address] [place] [address]     Employer: [employer] Financial resource strain: Not on file    Food insecurity     Worry: Not on file     Inability: Not on file    Transportation needs     Medical: Not on file     Non-medical: Not on file   Tobacco Use    Smoking status: Never Smoker    Smokeless tobacco: Never Used   Substance and Sexual Activity    Alcohol use: Yes     Comment: wine once in a while    Drug use: No    Sexual activity: Not on file   Lifestyle    Physical activity     Days per week: Not on file     Minutes per session: Not on file    Stress: Not on file   Relationships    Social connections     Talks on phone: Not on file     Gets together: Not on file     Attends Christianity service: Not on file     Active member of club or organization: Not on file     Attends meetings of clubs or organizations: Not on file     Relationship status: Not on file    Intimate partner violence     Fear of current or ex partner: Not on file     Emotionally abused: Not on file     Physically abused: Not on file     Forced sexual activity: Not on file   Other Topics Concern    Not on file   Social History Narrative    Not on file     Allergies   Allergen Reactions    Cefdinir Hives     Had reaction as a child    Lomedia 24 Fe [Norethin Ace-Eth Estrad-Fe] Hives    Sulfa Antibiotics Hives     Joint swell     Current Outpatient Medications on File Prior to Visit   Medication Sig Dispense Refill    clonazePAM (KLONOPIN) 0.5 MG tablet take 1 tablet by mouth twice a day if needed for anxiety 60 tablet 0    citalopram (CELEXA) 40 MG tablet Take 1 tablet by mouth daily 90 tablet 1    levothyroxine (SYNTHROID) 75 MCG tablet take 1 tablet by mouth once daily 30 tablet 3    Cholecalciferol 100 MCG (4000 UT) CAPS Take by mouth      Ascorbic Acid (VITAMIN C) 1000 MG tablet Take 1,000 mg by mouth daily      famotidine (PEPCID) 20 MG tablet Take 1 tablet by mouth 2 times daily 60 tablet 0    albuterol sulfate HFA (PROAIR HFA) 108 (90 Base) MCG/ACT inhaler Inhale 2 puffs into the lungs every 6 hours as needed for Wheezing 1 Inhaler 3    Normal range of motion and neck supple. Thyroid: No thyromegaly. Vascular: No JVD. Trachea: No tracheal deviation. Cardiovascular:      Rate and Rhythm: Normal rate and regular rhythm. Heart sounds: No murmur. No friction rub. No gallop. Pulmonary:      Effort: Pulmonary effort is normal. No respiratory distress or retractions. Breath sounds: Normal breath sounds. No stridor. No wheezing or rales. Chest:      Chest wall: No mass, lacerations, deformity, swelling, tenderness or edema. Breasts: Breasts are symmetrical.         Right: Mass present. No inverted nipple, nipple discharge, skin change or tenderness. Left: No inverted nipple, mass, nipple discharge, skin change or tenderness. Abdominal:      General: There is no distension. Palpations: Abdomen is soft. Tenderness: There is no abdominal tenderness. There is no guarding or rebound. Musculoskeletal: Normal range of motion. General: No tenderness or deformity. Right shoulder: Normal.      Left shoulder: Normal.   Lymphadenopathy:      Cervical: No cervical adenopathy. Right cervical: No superficial, deep or posterior cervical adenopathy. Left cervical: No superficial, deep or posterior cervical adenopathy. Upper Body:      Right upper body: No pectoral adenopathy. Left upper body: No pectoral adenopathy. Skin:     General: Skin is warm and dry. Coloration: Skin is not pale. Findings: No erythema or rash. Neurological:      Mental Status: She is alert and oriented to person, place, and time. Coordination: Coordination normal.   Psychiatric:         Behavior: Behavior normal.         Thought Content: Thought content normal.         Judgment: Judgment normal.        Assessment:      Sunday Bonnie is an extremely pleasant 31 y.o. female with FH of breast cancer in paternal aunt in her early 52's (currently late 52's).   She has a history of US core biopsy proven left breast fibroadenoma done at [facility] in 2011. She presents today with C/O of increasing nodularity, tender, upper outer quadrant right breast.  Only change in activity has been increasing weight lifting, about 3 times a week.       Bilateral breast US 03/14/2018 @ [facility]     Left Ultrasound       HISTORY:   The patient has no personal history of cancer. The patient has the following family history of breast cancer:  paternal aunt, at age 48. 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       ULTRASOUND TECHNIQUE:   High-resolution real-time ultrasound scanning was performed. Additional elastography and doppler color flow analysis of any finding was also obtained.       COMPARISON:   No prior imaging studies are available for comparison.       ULTRASOUND FINDINGS:           Finding 1:   Sonography was performed in the left breast using a radial and anti-radial approach. There is an oval solid mass with partially defined margins measuring 13 x 6 x 8 mm seen in the left breast at 8 o'clock located 3 centimeters from the nipple. Internal    echogenicity is hypoechoic.       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       It contains a clip which correlates with history of prior biopsy.       No sonographic evidence of suspicious solid or cystic mass lesions.  No focal areas of suspicious atypical echogenicity.       IMPRESSION:   Solid mass in the left breast is benign.       Screening mammogram at age 38 is recommended.       =======================================   BI-RADS Category 2:  Benign   =======================================      Right Ultrasound   HISTORY:   The patient has no personal history of cancer. The patient has the following family history of breast cancer:  paternal aunt, at age 48.  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       ULTRASOUND TECHNIQUE:   High-resolution real-time ultrasound scanning was performed. Additional elastography and doppler color flow analysis of any finding was also obtained.       COMPARISON:   No prior imaging studies are available for comparison.       ULTRASOUND FINDINGS:   Finding 1:   Sonography was performed in the right breast using a radial and anti-radial approach. No sonographic evidence of suspicious solid or cystic mass lesions. No focal areas of suspicious atypical echogenicity.       IMPRESSION:   There is no sonographic evidence of malignancy.       Screening mammogram at age 38 is recommended.       =======================================   BI-RADS Category 1:  Negative        9/24/18, Left breast ultrasound, [facility]:     ULTRASOUND FINDINGS:           Finding 1:   Sonography was performed in the left breast using a radial and anti-radial approach. There is a stable oval well circumscribed solid mass measuring 18 x 8 x 11 mm seen in the left breast at 8 o'clock located 5 centimeters from the nipple. Internal    echogenicity is hypoechoic.       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       No sonographic evidence of suspicious solid or cystic mass lesions.  No focal areas of suspicious atypical echogenicity.       IMPRESSION:   Stable solid mass in the left breast is benign.       Screening mammogram at age 38 is recommended.       =======================================   BI-RADS Category 2:  Benign       9/24/18, Right breast ultrasound, [facility]:     ULTRASOUND FINDINGS:   US with color only.           Finding 1:   Sonography was performed in the right breast using a radial and anti-radial approach. No sonographic evidence of suspicious solid or cystic mass lesions.  No focal areas of suspicious atypical echogenicity.       IMPRESSION:   There is no sonographic evidence of malignancy.       Screening mammogram at age 38 is recommended.       =======================================   BI-RADS Category 1:  Negative 1/24/19 - CT Soft Tissue Neck [facility]:     FINDINGS:   The parotid and submandibular glands appear normal in size and   attenuation with no focal lesions seen. The oropharynx and nasopharynx   appear normal. There is no evidence of tonsillar swelling. The   parapharyngeal spaces are intact. The epiglottis and aryepiglottic   folds appear normal. The thyroid gland is normal in size and enhances   homogeneously. The airway is patent. No thickening or nodularity is   seen in the region of the vocal cords.       There are scattered small bilateral cervical lymph nodes. There is a   digastric node on the right that measures 11 mm and a digastric node   on the left that measures 15 mm. There is a second node in the the   submandibular region between the submandibular gland and the left   sternocleidomastoid muscle measuring 10 mm. On the left there is a   similar node measuring 8 mm. All other nodes within the neck measure   less than 1 cm in size.        In the anterior mediastinum/retrosternal area anterior and superior   to the aortic arch there is an ill-defined soft tissue density that   measures 2.7 x 1.8 cm which is most likely residual thymic tissue in   this young patient. . This does not appear to be vascular. No other   enlarged lymph nodes are seen in the visible mediastinum or hilar   areas but these are not optimally evaluated. The cervical vessels are   patent bilaterally. The subcutaneous soft tissue and musculature   appear normal. The visible lung apices are expanded and clear. No   supraclavicular lymphadenopathy is seen.       The osseous structures appear unremarkable. The visible portions of   the brain appear unremarkable. The paranasal sinuses and mastoid air   cells are clear.       CONCLUSION:   1. A few mildly prominent cervical nodes are seen in the anterior   upper neck bilaterally as described above. These are considered   nonspecific. These are probably reactive. In the anterior   mediastinum/posterior to the manubrium sternum there is a 2.7 x 1.8 cm   ill-defined soft tissue density which could represent residual thymic   tissue in this young patient. If there is palpable lymphadenopathy   elsewhere, follow-up CT of the chest, abdomen and pelvis is   recommended for further evaluation. 2. Otherwise negative CT of the neck.           1/31/19 - CT Chest [facility]:  FINDINGS:       Previously seen finding is seen at thoracic inlet posterior to the   manubrium on prior represents normal left brachiocephalic vein. No   evidence of mediastinal mass or evidence of lymphadenopathy. The heart   is normal in size. No airspace opacity or evidence of pleural   effusion. There is no evidence of suspicious lung nodule or evidence   of mass. No evidence of pneumothorax. No axillary lymphadenopathy. View of the upper abdomen shows normal bilateral adrenal glands.           Impression       1. Findings seen on prior examination of neck soft tissue from January 24, 2019 seen posterior to the manubrium represents normal left   brachiocephalic vein.       2. No evidence of mediastinal or hilar lymphadenopathy.       3. Note made of a circumscribed, ovoid nodule seen in medial left   breast measures 17 x 9 mm possibly representing fibroadenoma. Correlation recommended. Ultrasound may be helpful for further   evaluation. 1/20/2020 - Right breast ultrasound - [facility]: ULTRASOUND FINDINGS:  Finding 1:  Sonography was performed in the right breast using a radial and anti-radial approach. No sonographic evidence of suspicious solid or cystic mass lesions. No focal areas of suspicious atypical echogenicity. IMPRESSION:  There is no sonographic evidence of malignancy. Screening mammogram at age 38 is recommended.       10/27/2020 - Right breast ultrasound - [facility]:       ULTRASOUND FINDINGS:              Finding 1:    Sonography was performed in the right breast using a radial and anti-radial approach. No sonographic evidence of suspicious solid or cystic mass lesions. No focal areas of suspicious atypical echogenicity.         IMPRESSION:    There is no sonographic evidence of malignancy.         Screening mammogram at age 38 is recommended.         =======================================    BI-RADS Category 1:  Negative    =======================================            All imaging reviewed and compared. Benign-appearing breast tissue on the right in area of nodularity. Suggested she continue to wear well fitting bras and take nonsteroidal anti-inflammatory agents on a as needed basis. Anticipate breast tenderness will resolve in time. Patient will follow us on a as needed basis and will get screening mammograms at age 38. Questions answered to patient satisfaction. Plan:      1. Continue monthly breast self examination; detailed instructions reviewed today. Bring any changes to your physician's attention. 2. Continue healthy diet and exercise routinely as tolerated. 3. Avoid alcohol or limit alcohol intake to < 3 drinks per week. 4. Limit caffeine intake as possible. 5. Wear a good supportive bra at all times when awake. 6. Mammogram beginning at age 38.  9. Continue follow up with Primary Care and GYN. RTC if symptoms worsen or if new symptoms develop. OV PRN. Jesica Duron MD [facility]  October 27, 2020

## 2020-10-27 ENCOUNTER — HOSPITAL ENCOUNTER (OUTPATIENT)
Dept: GENERAL RADIOLOGY | Age: 30
Discharge: HOME | End: 2020-10-29
Payer: COMMERCIAL

## 2020-10-27 ENCOUNTER — OFFICE VISIT (OUTPATIENT)
Dept: BREAST CENTER | Age: 30
End: 2020-10-27
Payer: COMMERCIAL

## 2020-10-27 VITALS
TEMPERATURE: 97 F | SYSTOLIC BLOOD PRESSURE: 102 MMHG | HEIGHT: 63 IN | HEART RATE: 70 BPM | OXYGEN SATURATION: 99 % | DIASTOLIC BLOOD PRESSURE: 64 MMHG | WEIGHT: 171 LBS | BODY MASS INDEX: 30.3 KG/M2 | RESPIRATION RATE: 18 BRPM

## 2020-10-27 PROCEDURE — 76642 ULTRASOUND BREAST LIMITED: CPT

## 2020-10-27 PROCEDURE — 99213 OFFICE O/P EST LOW 20 MIN: CPT | Performed by: SURGERY

## 2020-10-27 PROCEDURE — 99214 OFFICE O/P EST MOD 30 MIN: CPT | Performed by: SURGERY

## 2020-10-27 NOTE — PATIENT INSTRUCTIONS
RELEASE OF RESULTS AND RECORDS  As of October 1, 2020, all results (x-ray reports, labwork, pathology reports) will be released through [address] in real time per federal law. Once your test results are final, they will be automatically released to your electronic medical record [facility] where you will be able to see those results and any clinical notes associated with that result. In some cases, you may see those results and notes before your provider or the staff have had a chance to review. We will make every effort to contact you, especially about any abnormal or confusing results. If you view a result before we have contacted you, please wait up to one business day for us to reach you before calling with questions. If anything in your notes seems inaccurate, please message us through [facility] with potential corrections. If you see a term or language in your clinical note that doesn't make sense, please use the online health library reference tool in your MyChart (under the Health tab)  to help you interpret the note.       Patient to follow up as needed

## 2020-11-13 RX ORDER — LEVOTHYROXINE SODIUM 0.07 MG/1
TABLET ORAL
Qty: 30 TABLET | Refills: 3 | Status: SHIPPED
Start: 2020-11-13 | End: 2021-03-15

## 2020-11-13 RX ORDER — CLONAZEPAM 0.5 MG/1
TABLET ORAL
Qty: 60 TABLET | Refills: 1 | Status: SHIPPED
Start: 2020-11-13 | End: 2021-04-30

## 2020-11-18 ENCOUNTER — OFFICE VISIT (OUTPATIENT)
Dept: PRIMARY CARE CLINIC | Age: 30
End: 2020-11-18
Payer: COMMERCIAL

## 2020-11-18 VITALS
HEIGHT: 63 IN | TEMPERATURE: 98.1 F | RESPIRATION RATE: 16 BRPM | BODY MASS INDEX: 31.18 KG/M2 | SYSTOLIC BLOOD PRESSURE: 116 MMHG | OXYGEN SATURATION: 98 % | DIASTOLIC BLOOD PRESSURE: 70 MMHG | WEIGHT: 176 LBS | HEART RATE: 88 BPM

## 2020-11-18 DIAGNOSIS — E78.2 MIXED HYPERLIPIDEMIA: ICD-10-CM

## 2020-11-18 DIAGNOSIS — E55.9 VITAMIN D DEFICIENCY: ICD-10-CM

## 2020-11-18 DIAGNOSIS — R53.83 FATIGUE, UNSPECIFIED TYPE: ICD-10-CM

## 2020-11-18 DIAGNOSIS — E03.9 ACQUIRED HYPOTHYROIDISM: ICD-10-CM

## 2020-11-18 PROBLEM — J30.2 SEASONAL ALLERGIES: Status: ACTIVE | Noted: 2020-11-18

## 2020-11-18 LAB
ALBUMIN SERPL-MCNC: 4.3 G/DL (ref 3.5–5.2)
ALP BLD-CCNC: 70 U/L (ref 35–104)
ALT SERPL-CCNC: 18 U/L (ref 0–32)
ANION GAP SERPL CALCULATED.3IONS-SCNC: 8 MMOL/L (ref 7–16)
AST SERPL-CCNC: 21 U/L (ref 0–31)
BILIRUB SERPL-MCNC: 0.3 MG/DL (ref 0–1.2)
BUN BLDV-MCNC: 14 MG/DL (ref 6–20)
CALCIUM SERPL-MCNC: 9.3 MG/DL (ref 8.6–10.2)
CHLORIDE BLD-SCNC: 101 MMOL/L (ref 98–107)
CHOLESTEROL, TOTAL: 214 MG/DL (ref 0–199)
CO2: 29 MMOL/L (ref 22–29)
CREAT SERPL-MCNC: 0.7 MG/DL (ref 0.5–1)
FOLATE: >20 NG/ML (ref 4.8–24.2)
GFR AFRICAN AMERICAN: >60
GFR NON-AFRICAN AMERICAN: >60 ML/MIN/1.73
GLUCOSE BLD-MCNC: 91 MG/DL (ref 74–99)
HCT VFR BLD CALC: 38.8 % (ref 34–48)
HDLC SERPL-MCNC: 40 MG/DL
HEMOGLOBIN: 12.2 G/DL (ref 11.5–15.5)
LDL CHOLESTEROL CALCULATED: 146 MG/DL (ref 0–99)
MCH RBC QN AUTO: 28.6 PG (ref 26–35)
MCHC RBC AUTO-ENTMCNC: 31.4 % (ref 32–34.5)
MCV RBC AUTO: 90.9 FL (ref 80–99.9)
PDW BLD-RTO: 12.2 FL (ref 11.5–15)
PLATELET # BLD: 214 E9/L (ref 130–450)
PMV BLD AUTO: 11.1 FL (ref 7–12)
POTASSIUM SERPL-SCNC: 4.5 MMOL/L (ref 3.5–5)
RBC # BLD: 4.27 E12/L (ref 3.5–5.5)
SODIUM BLD-SCNC: 138 MMOL/L (ref 132–146)
TOTAL PROTEIN: 7.7 G/DL (ref 6.4–8.3)
TRIGL SERPL-MCNC: 138 MG/DL (ref 0–149)
TSH SERPL DL<=0.05 MIU/L-ACNC: 0.91 UIU/ML (ref 0.27–4.2)
VITAMIN B-12: 891 PG/ML (ref 211–946)
VITAMIN D 25-HYDROXY: 38 NG/ML (ref 30–100)
VLDLC SERPL CALC-MCNC: 28 MG/DL
WBC # BLD: 6.2 E9/L (ref 4.5–11.5)

## 2020-11-18 PROCEDURE — 99214 OFFICE O/P EST MOD 30 MIN: CPT | Performed by: FAMILY MEDICINE

## 2020-11-18 RX ORDER — OMEPRAZOLE 20 MG/1
CAPSULE, DELAYED RELEASE ORAL
COMMUNITY
Start: 2020-10-08 | End: 2021-12-08

## 2020-11-18 ASSESSMENT — ENCOUNTER SYMPTOMS
WHEEZING: 0
COUGH: 0
APNEA: 0
EYE ITCHING: 0
VOMITING: 0
CHEST TIGHTNESS: 0
BACK PAIN: 0
SINUS PRESSURE: 0
EYE REDNESS: 0
SORE THROAT: 0
EYE PAIN: 0
NAUSEA: 0
SHORTNESS OF BREATH: 0
BLOOD IN STOOL: 0
ABDOMINAL PAIN: 0
COLOR CHANGE: 0
RHINORRHEA: 0
CONSTIPATION: 0
DIARRHEA: 0

## 2020-11-18 NOTE — PROGRESS NOTES
Chief Complaint:     Chief Complaint   Patient presents with    Swelling     in hands    Otalgia     right sided, states water in ear    Hyperlipidemia    Fatigue         Otalgia    There is pain in both ears. This is a new problem. The current episode started in the past 7 days. The problem has been unchanged. There has been no fever. The pain is mild. Associated symptoms include hearing loss. Pertinent negatives include no abdominal pain, coughing, diarrhea, ear discharge, headaches, neck pain, rash, rhinorrhea, sore throat or vomiting. She has tried nothing for the symptoms. The treatment provided no relief. Hyperlipidemia   This is a recurrent problem. The current episode started more than 1 month ago. Exacerbating diseases include hypothyroidism. She has no history of diabetes or obesity. There are no known factors aggravating her hyperlipidemia. Pertinent negatives include no chest pain, myalgias or shortness of breath. She is currently on no antihyperlipidemic treatment. The current treatment provides significant improvement of lipids. There are no compliance problems. Risk factors for coronary artery disease include stress. Fatigue   This is a recurrent problem. The current episode started more than 1 month ago. The problem occurs intermittently. The problem has been waxing and waning. Associated symptoms include fatigue. Pertinent negatives include no abdominal pain, arthralgias, chest pain, congestion, coughing, fever, headaches, myalgias, nausea, neck pain, numbness, rash, sore throat, vomiting or weakness. Nothing aggravates the symptoms. She has tried nothing for the symptoms. The treatment provided no relief.        Patient Active Problem List   Diagnosis    Fatigue    Acquired hypothyroidism    Vitamin D deficiency    Gastroesophageal reflux disease without esophagitis    S/P tonsillectomy    Myalgia    Anxiety    Breast mass, left    Seasonal allergies    Mixed hyperlipidemia Allergies   Allergen Reactions    Cefdinir Hives     Had reaction as a child  Other reaction(s): AOF    Lomedia 24 Fe [Norethin Ace-Eth Estrad-Fe] Hives    Nutritional Supplements      Other reaction(s): AOF    Sulfa Antibiotics Hives     Joint swell       Social History     Socioeconomic History    Marital status:      Spouse name: None    Number of children: None    Years of education: None    Highest education level: None   Occupational History     Employer: [employer]     Employer: [employer]   Social Needs    Financial resource strain: None    Food insecurity     Worry: None     Inability: None    Transportation needs     Medical: None     Non-medical: None   Tobacco Use    Smoking status: Never Smoker    Smokeless tobacco: Never Used   Substance and Sexual Activity    Alcohol use: Yes     Comment: wine once in a while    Drug use: No    Sexual activity: None   Lifestyle    Physical activity     Days per week: None     Minutes per session: None    Stress: None   Relationships    Social connections     Talks on phone: None     Gets together: None     Attends Mor[employer] service: None     Active member of club or organization: None     Attends meetings of clubs or organizations: None     Relationship status: None    Intimate partner violence     Fear of current or ex partner: None     Emotionally abused: None     Physically abused: None     Forced sexual activity: None   Other Topics Concern    None   Social History Narrative    None       Family History   Problem Relation Age of Onset    Hypertension Mother         pulmonary hypertension     High Blood Pressure Mother     Other Mother     Heart Disease Father     Kidney Disease Father     Hypertension Father         IBS & polyps    Other Sister         gastric ulcer & IBS    Cancer Paternal Aunt 46        breast          Review of Systems   Constitutional: Positive for fatigue.  Negative for activity change, appetite change and fever. HENT: Positive for ear pain and hearing loss. Negative for congestion, ear discharge, nosebleeds, rhinorrhea, sinus pressure and sore throat. Eyes: Negative for pain, redness, itching and visual disturbance. Respiratory: Negative for apnea, cough, chest tightness, shortness of breath and wheezing. Cardiovascular: Negative for chest pain, palpitations and leg swelling. Gastrointestinal: Negative for abdominal pain, blood in stool, constipation, diarrhea, nausea and vomiting. Endocrine: Negative. Genitourinary: Negative for decreased urine volume, difficulty urinating, dysuria, frequency, hematuria and urgency. Musculoskeletal: Negative for arthralgias, back pain, gait problem, myalgias and neck pain. Skin: Negative for color change and rash. Allergic/Immunologic: Negative for environmental allergies and food allergies. Neurological: Negative for dizziness, weakness, light-headedness, numbness and headaches. Hematological: Negative for adenopathy. Does not bruise/bleed easily. Psychiatric/Behavioral: Negative for behavioral problems, dysphoric mood and sleep disturbance. The patient is not nervous/anxious and is not hyperactive. All other systems reviewed and are negative. /70   Pulse 88   Temp 98.1 °F (36.7 °C)   Resp 16   Ht 5' 3\" (1.6 m)   Wt 176 lb (79.8 kg)   SpO2 98%   BMI 31.18 kg/m²     Physical Exam  Vitals signs and nursing note reviewed. Constitutional:       General: She is not in acute distress. Appearance: Normal appearance. She is well-developed. HENT:      Head: Normocephalic and atraumatic. Right Ear: Hearing, tympanic membrane and external ear normal. No tenderness. No middle ear effusion. Left Ear: Hearing, tympanic membrane and external ear normal. No tenderness. No middle ear effusion. Nose: Nose normal. No congestion or rhinorrhea. Right Turbinates: Not enlarged.       Left Turbinates: Not enlarged. Mouth/Throat:      Mouth: Mucous membranes are moist.      Tongue: No lesions. Pharynx: Oropharynx is clear. No oropharyngeal exudate or posterior oropharyngeal erythema. Eyes:      General: No scleral icterus. Conjunctiva/sclera: Conjunctivae normal.      Pupils: Pupils are equal, round, and reactive to light. Neck:      Musculoskeletal: Normal range of motion and neck supple. No neck rigidity or muscular tenderness. Thyroid: No thyromegaly. Cardiovascular:      Rate and Rhythm: Normal rate and regular rhythm. Heart sounds: Normal heart sounds. No murmur. Pulmonary:      Effort: Pulmonary effort is normal. No respiratory distress. Breath sounds: Normal breath sounds. No wheezing or rales. Abdominal:      General: Bowel sounds are normal. There is no distension. Palpations: Abdomen is soft. Tenderness: There is no abdominal tenderness. Musculoskeletal: Normal range of motion. General: No tenderness. Lymphadenopathy:      Cervical: No cervical adenopathy. Skin:     General: Skin is warm and dry. Findings: No erythema or rash. Neurological:      General: No focal deficit present. Mental Status: She is alert and oriented to person, place, and time. Cranial Nerves: No cranial nerve deficit. Deep Tendon Reflexes: Reflexes are normal and symmetric. Reflexes normal.   Psychiatric:         Mood and Affect: Mood normal.                                 ASSESSMENT/PLAN:    Patient Active Problem List   Diagnosis    Fatigue    Acquired hypothyroidism    Vitamin D deficiency    Gastroesophageal reflux disease without esophagitis    S/P tonsillectomy    Myalgia    Anxiety    Breast mass, left    Seasonal allergies    Mixed hyperlipidemia       Garret was seen today for swelling, otalgia, hyperlipidemia and fatigue.     Diagnoses and all orders for this visit:    Acquired hypothyroidism  -     TSH without Reflex; Future    Fatigue, unspecified type  -     Vitamin B12 & Folate; Future    Vitamin D deficiency  -     Vitamin D 25 Hydroxy; Future    Dysfunction of both eustachian tubes    Seasonal allergies    Mixed hyperlipidemia  -     CBC; Future  -     Comprehensive Metabolic Panel; Future  -     Lipid Panel; Future  -     TSH without Reflex; Future          Return in about 6 months (around 5/18/2021) for [address] labs. I spent 25 min with this patient. I spent greater than 50% of the time counseling this patient.         Fito Salguero DO  11/18/2020  9:03 AM

## 2020-11-19 RX ORDER — ROSUVASTATIN CALCIUM 5 MG/1
5 TABLET, COATED ORAL DAILY
Qty: 90 TABLET | Refills: 1 | Status: SHIPPED
Start: 2020-11-19 | End: 2021-03-26 | Stop reason: ALTCHOICE

## 2020-12-15 ENCOUNTER — VIRTUAL VISIT (OUTPATIENT)
Dept: PRIMARY CARE CLINIC | Age: 30
End: 2020-12-15
Payer: COMMERCIAL

## 2020-12-15 PROCEDURE — 99214 OFFICE O/P EST MOD 30 MIN: CPT | Performed by: FAMILY MEDICINE

## 2020-12-15 RX ORDER — PHENTERMINE HYDROCHLORIDE 37.5 MG/1
37.5 TABLET ORAL
Qty: 30 TABLET | Refills: 0 | Status: SHIPPED | OUTPATIENT
Start: 2020-12-15 | End: 2021-01-14

## 2020-12-15 ASSESSMENT — ENCOUNTER SYMPTOMS
COUGH: 0
EYE REDNESS: 0
ABDOMINAL PAIN: 0
VOMITING: 0
CONSTIPATION: 0
SORE THROAT: 0
RHINORRHEA: 0
EYE ITCHING: 0
BLOOD IN STOOL: 0
SHORTNESS OF BREATH: 0
SINUS PRESSURE: 0
NAUSEA: 0
APNEA: 0
DIARRHEA: 0
EYE PAIN: 0
COLOR CHANGE: 0
CHEST TIGHTNESS: 0
WHEEZING: 0
BACK PAIN: 0

## 2020-12-15 NOTE — PROGRESS NOTES
This is a recurrent problem. The current episode started more than 1 month ago. Exacerbating diseases include hypothyroidism. She has no history of diabetes or obesity. There are no known factors aggravating her hyperlipidemia. Pertinent negatives include no chest pain, myalgias or shortness of breath. She is currently on no antihyperlipidemic treatment. The current treatment provides significant improvement of lipids. There are no compliance problems. Risk factors for coronary artery disease include stress. Fatigue  This is a recurrent problem. The current episode started more than 1 month ago. The problem occurs intermittently. The problem has been waxing and waning. Associated symptoms include fatigue. Pertinent negatives include no abdominal pain, arthralgias, chest pain, congestion, coughing, fever, headaches, myalgias, nausea, neck pain, numbness, rash, sore throat, vomiting or weakness. Nothing aggravates the symptoms. She has tried nothing for the symptoms. The treatment provided no relief.        Patient Active Problem List   Diagnosis    Fatigue    Acquired hypothyroidism    Vitamin D deficiency    Gastroesophageal reflux disease without esophagitis    S/P tonsillectomy    Myalgia    Anxiety    Breast mass, left    Seasonal allergies    Mixed hyperlipidemia       Past Medical History:   Diagnosis Date    Asthma     excersise induced and allergy induced    Enlarged lymph node     at left neck, to have a bx 2018    Environmental allergies     IBS (irritable bowel syndrome)     Mitral valve prolapse     no regurgitation, with anxiety or caffeine developes palpitations,followed with Dr. Jelani Herron, has appt with Dr. Nereida Sanders 2018    Palpitations 2014    caffeine induced    Sore throat     frequent    Thyroid disease        Past Surgical History:   Procedure Laterality Date     SECTION      2 children     COLONOSCOPY  16   [facility] DENTAL SURGERY      extractions  PA BIOPSY OROPHARYNX Left 11/29/2018    LEFT TONGUE  BIOPSY performed by Geovanny Erwin MD at [facility] 61 OF TONSILS,12+ Y/O N/A 11/29/2018    TONSILLECTOMY performed by Geovanny Erwin MD at [facility] N/A 12/6/2018    TONSILLECTOMY POSTOP HEMORRHAGE CONTROL performed by Geovanny Erwin MD at [facility] OR       Current Outpatient Medications   Medication Sig Dispense Refill    phentermine (ADIPEX-P) 37.5 MG tablet Take 1 tablet by mouth every morning (before breakfast) for 30 days. 30 tablet 0    rosuvastatin (CRESTOR) 5 MG tablet Take 1 tablet by mouth daily 90 tablet 1    omeprazole (PRILOSEC) 20 MG delayed release capsule TAKE 2 CAPSULES BY MOUTH ONCE DAILY IN THE MORNING      levothyroxine (SYNTHROID) 75 MCG tablet take 1 tablet by mouth once daily 30 tablet 3    citalopram (CELEXA) 40 MG tablet Take 1 tablet by mouth daily 90 tablet 1    Cholecalciferol 100 MCG (4000 UT) CAPS Take by mouth      Ascorbic Acid (VITAMIN C) 1000 MG tablet Take 1,000 mg by mouth daily      famotidine (PEPCID) 20 MG tablet Take 1 tablet by mouth 2 times daily 60 tablet 0    albuterol sulfate HFA (PROAIR HFA) 108 (90 Base) MCG/ACT inhaler Inhale 2 puffs into the lungs every 6 hours as needed for Wheezing 1 Inhaler 3    loratadine (CLARITIN) 10 MG tablet Take 10 mg by mouth daily      clonazePAM (KLONOPIN) 0.5 MG tablet take 1 tablet by mouth twice a day if needed for anxiety 60 tablet 1     No current facility-administered medications for this visit.         Allergies   Allergen Reactions    Cefdinir Hives     Had reaction as a child  Other reaction(s): AOF    Lomedia 24 Fe [Norethin Ace-Eth Estrad-Fe] Hives    Nutritional Supplements      Other reaction(s): AOF    Sulfa Antibiotics Hives     Joint swell       Social History     Socioeconomic History    Marital status:      Spouse name: None    Number of children: None    Years of education: None  Highest education level: None   Occupational History     Employer: [employer]     Employer: [employer]   Social Needs    Financial resource strain: None    Food insecurity     Worry: None     Inability: None    Transportation needs     Medical: None     Non-medical: None   Tobacco Use    Smoking status: Never Smoker    Smokeless tobacco: Never Used   Substance and Sexual Activity    Alcohol use: Yes     Comment: wine once in a while    Drug use: No    Sexual activity: None   Lifestyle    Physical activity     Days per week: None     Minutes per session: None    Stress: None   Relationships    Social connections     Talks on phone: None     Gets together: None     Attends Protestant service: None     Active member of club or organization: None     Attends meetings of clubs or organizations: None     Relationship status: None    Intimate partner violence     Fear of current or ex partner: None     Emotionally abused: None     Physically abused: None     Forced sexual activity: None   Other Topics Concern    None   Social History Narrative    None       Family History   Problem Relation Age of Onset    Hypertension Mother         pulmonary hypertension     High Blood Pressure Mother     Other Mother     Heart Disease Father     Kidney Disease Father     Hypertension Father         IBS & polyps    Other Sister         gastric ulcer & IBS    Cancer Paternal Aunt 46        breast          Review of Systems   Constitutional: Positive for fatigue. Negative for activity change, appetite change and fever. HENT: Positive for ear pain and hearing loss. Negative for congestion, ear discharge, nosebleeds, rhinorrhea, sinus pressure and sore throat. Eyes: Negative for pain, redness, itching and visual disturbance. Respiratory: Negative for apnea, cough, chest tightness, shortness of breath and wheezing. Cardiovascular: Negative for chest pain, palpitations and leg swelling. Fatigue, unspecified type    Acquired hypothyroidism    Class 1 obesity due to excess calories without serious comorbidity with body mass index (BMI) of 31.0 to 31.9 in adult  -     phentermine (ADIPEX-P) 37.5 MG tablet; Take 1 tablet by mouth every morning (before breakfast) for 30 days. Counseled on meds  Counseled on diet, exercise  Discussed \"MyFitnessPal\" anita for calorie tracking    Return in about 4 weeks (around 1/12/2021) for Recheck Meds. I spent 25 minutes with this patient. I spent greater than 50% of the time counseling this patient.         Claudette Palumbo,   12/15/2020  11:23 AM

## 2020-12-22 ENCOUNTER — TELEPHONE (OUTPATIENT)
Dept: ADMINISTRATIVE | Age: 30
End: 2020-12-22

## 2020-12-22 ENCOUNTER — VIRTUAL VISIT (OUTPATIENT)
Dept: PRIMARY CARE CLINIC | Age: 30
End: 2020-12-22
Payer: COMMERCIAL

## 2020-12-22 PROCEDURE — 99213 OFFICE O/P EST LOW 20 MIN: CPT | Performed by: FAMILY MEDICINE

## 2020-12-22 RX ORDER — FLUCONAZOLE 100 MG/1
100 TABLET ORAL DAILY
Qty: 3 TABLET | Refills: 0 | Status: SHIPPED | OUTPATIENT
Start: 2020-12-22 | End: 2020-12-25

## 2020-12-22 RX ORDER — DOXYCYCLINE HYCLATE 100 MG
100 TABLET ORAL 2 TIMES DAILY
Qty: 20 TABLET | Refills: 0 | Status: SHIPPED | OUTPATIENT
Start: 2020-12-22 | End: 2021-01-01

## 2020-12-22 ASSESSMENT — ENCOUNTER SYMPTOMS
SHORTNESS OF BREATH: 0
EYE ITCHING: 0
APNEA: 0
SWOLLEN GLANDS: 0
WHEEZING: 0
DIARRHEA: 0
NAUSEA: 0
BLOOD IN STOOL: 0
COUGH: 0
RHINORRHEA: 0
SINUS PRESSURE: 1
VOMITING: 0
CONSTIPATION: 0
SORE THROAT: 0
COLOR CHANGE: 0
EYE PAIN: 0
EYE REDNESS: 0
CHEST TIGHTNESS: 0
BACK PAIN: 0
ABDOMINAL PAIN: 0

## 2020-12-22 NOTE — PROGRESS NOTES
TeleMedicine Video Visit    This visit was performed as a virtual video visit using a synchronous, two-way, audio-video telehealth technology platform. Patient identification was verified at the start of the visit, including the patient's telephone number and physical location. I discussed with the patient the nature of our telehealth visits, that:     1. Due to the nature of an audio- video modality, the only components of a physical exam that could be done are the elements supported by direct observation. 2. I would evaluate the patient and recommend diagnostics and treatments based on my assessment. 3. If it was felt that the patient should be evaluated in clinic or an emergency room setting, then they would be directed there. 4. Our sessions are not being recorded and that personal health information is protected. 5. Our team would provide follow up care in person if/when the patient needs it. Patient does agree to proceed with telemedicine consultation. Patient's location: home address in [facility]  Physician  location other address in [facility] other people involved in call n/a      Time spent: Greater than 15    This visit was completed virtually using Doxy. me        Chief Complaint:     Chief Complaint   Patient presents with    Sinusitis    Drainage    Headache     sinus headache yesterday         Sinusitis  This is a new problem. The current episode started in the past 7 days. The problem has been waxing and waning since onset. There has been no fever. Associated symptoms include congestion, headaches and sinus pressure. Pertinent negatives include no coughing, ear pain, neck pain, shortness of breath, sneezing, sore throat or swollen glands. Past treatments include nothing. The treatment provided no relief.        Patient Active Problem List   Diagnosis    Fatigue    Acquired hypothyroidism    Vitamin D deficiency    Gastroesophageal reflux disease without esophagitis    S/P tonsillectomy  Myalgia    Anxiety    Breast mass, left    Seasonal allergies    Mixed hyperlipidemia       Past Medical History:   Diagnosis Date    Asthma     excersise induced and allergy induced    Enlarged lymph node     at left neck, to have a bx 2018    Environmental allergies     IBS (irritable bowel syndrome)     Mitral valve prolapse     no regurgitation, with anxiety or caffeine developes palpitations,followed with Dr. Rohan Wright, has appt with Dr. Antonella Cool 2018    Palpitations 2014    caffeine induced    Sore throat     frequent    Thyroid disease        Past Surgical History:   Procedure Laterality Date     SECTION      2 children     COLONOSCOPY  16    DENTAL SURGERY      extractions    ME BIOPSY OROPHARYNX Left 2018    LEFT TONGUE  BIOPSY performed by Geovanny Erwin MD at [place] [address] OF TONSILS,12+ Y/O N/A 2018    TONSILLECTOMY performed by Geovanny Erwin MD at [address] [place] [address] N/A 2018    TONSILLECTOMY POSTOP HEMORRHAGE CONTROL performed [facility] MD at [facility] OR       Current Outpatient Medications   Medication Sig Dispense Refill    doxycycline hyclate (VIBRA-TABS) 100 MG tablet Take 1 tablet by mouth 2 times daily for 10 days 20 tablet 0    fluconazole (DIFLUCAN) 100 MG tablet Take 1 tablet by mouth daily for 3 days 3 tablet 0    phentermine (ADIPEX-P) 37.5 MG tablet Take 1 tablet by mouth every morning (before breakfast) for 30 days.  30 tablet 0    rosuvastatin (CRESTOR) 5 MG tablet Take 1 tablet by mouth daily 90 tablet 1    omeprazole (PRILOSEC) 20 MG delayed release capsule TAKE 2 CAPSULES BY MOUTH ONCE DAILY IN THE MORNING      levothyroxine (SYNTHROID) 75 MCG tablet take 1 tablet by mouth once daily 30 tablet 3    citalopram (CELEXA) 40 MG tablet Take 1 tablet by mouth daily 90 tablet 1    Cholecalciferol 100 MCG (4000 UT) CAPS Take by mouth  Ascorbic Acid (VITAMIN C) 1000 MG tablet Take 1,000 mg by mouth daily      famotidine (PEPCID) 20 MG tablet Take 1 tablet by mouth 2 times daily 60 tablet 0    albuterol sulfate HFA (PROAIR HFA) 108 (90 Base) MCG/ACT inhaler Inhale 2 puffs into the lungs every 6 hours as needed for Wheezing 1 Inhaler 3    loratadine (CLARITIN) 10 MG tablet Take 10 mg by mouth daily      clonazePAM (KLONOPIN) 0.5 MG tablet take 1 tablet by mouth twice a day if needed for anxiety 60 tablet 1     No current facility-administered medications for this visit.         Allergies   Allergen Reactions    Cefdinir Hives     Had reaction as a child  Other reaction(s): AOF    Lomedia 24 Fe [Norethin Ace-Eth Estrad-Fe] Hives    Nutritional Supplements      Other reaction(s): AOF    Sulfa Antibiotics Hives     Joint swell       Social History     Socioeconomic History    Marital status:      Spouse name: None    Number of children: None    Years of education: None    Highest education level: None   Occupational History     Employer: [employer]     Employer: [employer]   Social Needs    Financial resource strain: None    Food insecurity     Worry: None     Inability: None    Transportation needs     Medical: None     Non-medical: None   Tobacco Use    Smoking status: Never Smoker    Smokeless tobacco: Never Used   Substance and Sexual Activity    Alcohol use: Yes     Comment: wine once in a while    Drug use: No    Sexual activity: None   Lifestyle    Physical activity     Days per week: None     Minutes per session: None    Stress: None   Relationships    Social connections     Talks on phone: None     Gets together: None     Attends Jainism service: None     Active member of club or organization: None     Attends meetings of clubs or organizations: None     Relationship status: None    Intimate partner violence     Fear of current or ex partner: None     Emotionally abused: None Physically abused: None     Forced sexual activity: None   Other Topics Concern    None   Social History Narrative    None       Family History   Problem Relation Age of Onset    Hypertension Mother         pulmonary hypertension     High Blood Pressure Mother     Other Mother     Heart Disease Father     Kidney Disease Father     Hypertension Father         IBS & polyps    Other Sister         gastric ulcer & IBS    Cancer Paternal Aunt 46        breast          Review of Systems   Constitutional: Negative for activity change, appetite change, fatigue and fever. HENT: Positive for congestion and sinus pressure. Negative for ear pain, hearing loss, nosebleeds, rhinorrhea, sneezing and sore throat. Eyes: Negative for pain, redness, itching and visual disturbance. Respiratory: Negative for apnea, cough, chest tightness, shortness of breath and wheezing. Cardiovascular: Negative for chest pain, palpitations and leg swelling. Gastrointestinal: Negative for abdominal pain, blood in stool, constipation, diarrhea, nausea and vomiting. Endocrine: Negative. Genitourinary: Negative for decreased urine volume, difficulty urinating, dysuria, frequency, hematuria and urgency. Musculoskeletal: Negative for arthralgias, back pain, gait problem, myalgias and neck pain. Skin: Negative for color change and rash. Allergic/Immunologic: Negative for environmental allergies and food allergies. Neurological: Positive for headaches. Negative for dizziness, weakness, light-headedness and numbness. Hematological: Negative for adenopathy. Does not bruise/bleed easily. Psychiatric/Behavioral: Negative for behavioral problems, dysphoric mood and sleep disturbance. The patient is not nervous/anxious and is not hyperactive. All other systems reviewed and are negative.       Patient-Reported Vitals 12/22/2020   Patient-Reported Weight 176lb   Patient-Reported Height 5 3 There were no vitals taken for this visit. Physical Exam  Vitals signs and nursing note reviewed. Constitutional:       General: She is not in acute distress. Appearance: Normal appearance. She is not toxic-appearing. Pulmonary:      Effort: Pulmonary effort is normal. No respiratory distress. Neurological:      Mental Status: She is alert and oriented to person, place, and time. Psychiatric:         Attention and Perception: Attention normal.         Mood and Affect: Mood and affect normal.         Speech: Speech normal.                                 ASSESSMENT/PLAN:    Patient Active Problem List   Diagnosis    Fatigue    Acquired hypothyroidism    Vitamin D deficiency    Gastroesophageal reflux disease without esophagitis    S/P tonsillectomy    Myalgia    Anxiety    Breast mass, left    Seasonal allergies    Mixed hyperlipidemia       Garret was seen today for sinusitis, drainage and headache. Diagnoses and all orders for this visit:    Acute non-recurrent maxillary sinusitis    Other orders  -     doxycycline hyclate (VIBRA-TABS) 100 MG tablet; Take 1 tablet by mouth 2 times daily for 10 days  -     fluconazole (DIFLUCAN) 100 MG tablet; Take 1 tablet by mouth daily for 3 days          Return if symptoms worsen or fail to improve. I spent 15 minutes with this patient. I spent greater than 50% of the time counseling this patient.         Torri Gill DO  12/22/2020  11:49 AM

## 2020-12-22 NOTE — TELEPHONE ENCOUNTER
Pt states she still continues to have facial pain/drainage and her throat feels irritated. Transferred to office.

## 2021-01-12 ENCOUNTER — VIRTUAL VISIT (OUTPATIENT)
Dept: PRIMARY CARE CLINIC | Age: 31
End: 2021-01-12
Payer: COMMERCIAL

## 2021-01-12 DIAGNOSIS — E78.2 MIXED HYPERLIPIDEMIA: ICD-10-CM

## 2021-01-12 DIAGNOSIS — E03.9 ACQUIRED HYPOTHYROIDISM: Primary | ICD-10-CM

## 2021-01-12 DIAGNOSIS — R53.83 FATIGUE, UNSPECIFIED TYPE: ICD-10-CM

## 2021-01-12 PROCEDURE — 99213 OFFICE O/P EST LOW 20 MIN: CPT | Performed by: FAMILY MEDICINE

## 2021-01-12 ASSESSMENT — PATIENT HEALTH QUESTIONNAIRE - PHQ9
SUM OF ALL RESPONSES TO PHQ QUESTIONS 1-9: 0
SUM OF ALL RESPONSES TO PHQ QUESTIONS 1-9: 0
2. FEELING DOWN, DEPRESSED OR HOPELESS: 0

## 2021-01-12 ASSESSMENT — ENCOUNTER SYMPTOMS
CHEST TIGHTNESS: 0
CONSTIPATION: 0
SORE THROAT: 0
COLOR CHANGE: 0
ABDOMINAL PAIN: 0
SHORTNESS OF BREATH: 0
EYE REDNESS: 0
APNEA: 0
BACK PAIN: 0
RHINORRHEA: 0
WHEEZING: 0
BLOOD IN STOOL: 0
VOMITING: 0
EYE ITCHING: 0
NAUSEA: 0
COUGH: 0
DIARRHEA: 0
EYE PAIN: 0
SINUS PRESSURE: 0

## 2021-01-12 NOTE — PROGRESS NOTES
TeleMedicine Video Visit    This visit was performed as a virtual video visit using a synchronous, two-way, audio-video telehealth technology platform. Patient identification was verified at the start of the visit, including the patient's telephone number and physical location. I discussed with the patient the nature of our telehealth visits, that:     1. Due to the nature of an audio- video modality, the only components of a physical exam that could be done are the elements supported by direct observation. 2. I would evaluate the patient and recommend diagnostics and treatments based on my assessment. 3. If it was felt that the patient should be evaluated in clinic or an emergency room setting, then they would be directed there. 4. Our sessions are not being recorded and that personal health information is protected. 5. Our team would provide follow up care in person if/when the patient needs it. Patient does agree to proceed with telemedicine consultation. Patient's location: home address in [facility]  Physician  location other address in [facility] other people involved in call n/a      Time spent: Greater than 15    This visit was completed virtually using Doxy. me        Chief Complaint:     Chief Complaint   Patient presents with    Hypothyroidism    Fatigue    Other         Fatigue  This is a recurrent problem. The current episode started more than 1 month ago. The problem occurs intermittently. The problem has been waxing and waning. Associated symptoms include fatigue. Pertinent negatives include no abdominal pain, arthralgias, chest pain, congestion, coughing, fever, headaches, myalgias, nausea, neck pain, numbness, rash, sore throat, vomiting or weakness. Nothing aggravates the symptoms. She has tried nothing for the symptoms. The treatment provided no relief. Other  Associated symptoms include fatigue.  Pertinent negatives include no abdominal pain, arthralgias, chest pain, congestion, coughing, fever, headaches, myalgias, nausea, neck pain, numbness, rash, sore throat, vomiting or weakness. Hyperlipidemia  This is a recurrent problem. The current episode started more than 1 month ago. Exacerbating diseases include hypothyroidism. She has no history of diabetes or obesity. There are no known factors aggravating her hyperlipidemia. Pertinent negatives include no chest pain, myalgias or shortness of breath. She is currently on no antihyperlipidemic treatment. The current treatment provides significant improvement of lipids. There are no compliance problems. Risk factors for coronary artery disease include stress.        Patient Active Problem List   Diagnosis    Fatigue    Acquired hypothyroidism    Vitamin D deficiency    Gastroesophageal reflux disease without esophagitis    S/P tonsillectomy    Myalgia    Anxiety    Breast mass, left    Seasonal allergies    Mixed hyperlipidemia       Past Medical History:   Diagnosis Date    Asthma     excersise induced and allergy induced    Enlarged lymph node     at left neck, to have a bx 2018    Environmental allergies     IBS (irritable bowel syndrome)     Mitral valve prolapse     no regurgitation, with anxiety or caffeine developes palpitations,followed with Dr. Charan Suarez, has appt with Dr. Romana Oconnell 2018    Palpitations 2014    caffeine induced    Sore throat     frequent    Thyroid disease        Past Surgical History:   Procedure Laterality Date     SECTION      2 children     COLONOSCOPY  16    DENTAL SURGERY      extractions    MA BIOPSY OROPHARYNX Left 2018    LEFT TONGUE  BIOPSY performed by Renaldo Mae MD at [facility] 61 OF TONSILS,12+ Y/O N/A 2018    TONSILLECTOMY performed by Renaldo Mae MD at [facility] 57 N/A 2018    TONSILLECTOMY POSTOP HEMORRHAGE CONTROL performed by [facility] at [facility] OR       Current Outpatient Medications Medication Sig Dispense Refill    phentermine (ADIPEX-P) 37.5 MG tablet Take 1 tablet by mouth every morning (before breakfast) for 30 days. 30 tablet 0    rosuvastatin (CRESTOR) 5 MG tablet Take 1 tablet by mouth daily 90 tablet 1    omeprazole (PRILOSEC) 20 MG delayed release capsule TAKE 2 CAPSULES BY MOUTH ONCE DAILY IN THE MORNING      levothyroxine (SYNTHROID) 75 MCG tablet take 1 tablet by mouth once daily 30 tablet 3    citalopram (CELEXA) 40 MG tablet Take 1 tablet by mouth daily 90 tablet 1    Cholecalciferol 100 MCG (4000 UT) CAPS Take by mouth      Ascorbic Acid (VITAMIN C) 1000 MG tablet Take 1,000 mg by mouth daily      famotidine (PEPCID) 20 MG tablet Take 1 tablet by mouth 2 times daily 60 tablet 0    albuterol sulfate HFA (PROAIR HFA) 108 (90 Base) MCG/ACT inhaler Inhale 2 puffs into the lungs every 6 hours as needed for Wheezing 1 Inhaler 3    loratadine (CLARITIN) 10 MG tablet Take 10 mg by mouth daily      clonazePAM (KLONOPIN) 0.5 MG tablet take 1 tablet by mouth twice a day if needed for anxiety 60 tablet 1     No current facility-administered medications for this visit.         Allergies   Allergen Reactions    Cefdinir Hives     Had reaction as a child  Other reaction(s): AOF    Lomedia 24 Fe [Norethin Ace-Eth Estrad-Fe] Hives    Nutritional Supplements      Other reaction(s): AOF    Sulfa Antibiotics Hives     Joint swell       Social History     Socioeconomic History    Marital status:      Spouse name: None    Number of children: None    Years of education: None    Highest education level: None   Occupational History     Employer: [address] [place] [address]     Employer: [employer]   Social Needs    Financial resource strain: None    Food insecurity     Worry: None     Inability: None    Transportation needs     Medical: None     Non-medical: None   Tobacco Use    Smoking status: Never Smoker    Smokeless tobacco: Never Used   Substance and Sexual Activity    Alcohol use: Yes     Comment: wine once in a while    Drug use: No    Sexual activity: None   Lifestyle    Physical activity     Days per week: None     Minutes per session: None    Stress: None   Relationships    Social connections     Talks on phone: None     Gets together: None     Attends Zoroastrian service: None     Active member of club or organization: None     Attends meetings of clubs or organizations: None     Relationship status: None    Intimate partner violence     Fear of current or ex partner: None     Emotionally abused: None     Physically abused: None     Forced sexual activity: None   Other Topics Concern    None   Social History Narrative    None       Family History   Problem Relation Age of Onset    Hypertension Mother         pulmonary hypertension     High Blood Pressure Mother     Other Mother     Heart Disease Father     Kidney Disease Father     Hypertension Father         IBS & polyps    Other Sister         gastric ulcer & IBS    Cancer Paternal Aunt 46        breast          Review of Systems   Constitutional: Positive for fatigue. Negative for activity change, appetite change and fever. HENT: Negative for congestion, ear pain, hearing loss, nosebleeds, rhinorrhea, sinus pressure and sore throat. Eyes: Negative for pain, redness, itching and visual disturbance. Respiratory: Negative for apnea, cough, chest tightness, shortness of breath and wheezing. Cardiovascular: Negative for chest pain, palpitations and leg swelling. Gastrointestinal: Negative for abdominal pain, blood in stool, constipation, diarrhea, nausea and vomiting. Endocrine: Negative. Genitourinary: Negative for decreased urine volume, difficulty urinating, dysuria, frequency, hematuria and urgency. Musculoskeletal: Negative for arthralgias, back pain, gait problem, myalgias and neck pain. Skin: Negative for color change and rash.    Allergic/Immunologic: Negative for environmental allergies and food allergies. Neurological: Negative for dizziness, weakness, light-headedness, numbness and headaches. Hematological: Negative for adenopathy. Does not bruise/bleed easily. Psychiatric/Behavioral: Negative for behavioral problems, dysphoric mood and sleep disturbance. The patient is not nervous/anxious and is not hyperactive. All other systems reviewed and are negative. Patient-Reported Vitals 12/22/2020   Patient-Reported Weight 176lb   Patient-Reported Height 5 3      There were no vitals taken for this visit. Physical Exam  Vitals signs and nursing note reviewed. Constitutional:       General: She is not in acute distress. Appearance: Normal appearance. She is not toxic-appearing. Pulmonary:      Effort: Pulmonary effort is normal. No respiratory distress. Neurological:      Mental Status: She is alert and oriented to person, place, and time. Psychiatric:         Attention and Perception: Attention normal.         Mood and Affect: Mood and affect normal.         Speech: Speech normal.                                 ASSESSMENT/PLAN:    Patient Active Problem List   Diagnosis    Fatigue    Acquired hypothyroidism    Vitamin D deficiency    Gastroesophageal reflux disease without esophagitis    S/P tonsillectomy    Myalgia    Anxiety    Breast mass, left    Seasonal allergies    Mixed hyperlipidemia       Garret was seen today for hypothyroidism, fatigue and other. Diagnoses and all orders for this visit:    Acquired hypothyroidism  -     TSH without Reflex; Future    Fatigue, unspecified type    Mixed hyperlipidemia      Continue same meds, except Adipex. Did not tolerate. Continue diet, exercise management    Return in about 3 months (around 4/12/2021) for Recheck labs, Recheck Meds. I spent 15 minutes with this patient. I spent greater than 50% of the time counseling this patient.         Mee Salamanca DO  1/12/2021  11:18 AM

## 2021-01-14 RX ORDER — CITALOPRAM 40 MG/1
TABLET ORAL
Qty: 90 TABLET | Refills: 1 | Status: SHIPPED
Start: 2021-01-14 | End: 2021-08-02

## 2021-02-12 ENCOUNTER — TELEPHONE (OUTPATIENT)
Dept: PRIMARY CARE CLINIC | Age: 31
End: 2021-02-12

## 2021-02-12 NOTE — TELEPHONE ENCOUNTER
You put the pt on phentermine 37.5 mg tablet, she took it for about a week and then went off it because it was giving her palpitations and she just didn't like the way it made her feel.  She is calling to ask if she could break the tablet in half and take it that way

## 2021-02-15 NOTE — TELEPHONE ENCOUNTER
Patient opted to take half a tab Adipex she is not having the adverse reactions she was previously experiencing. Wanting to run this by you. Started taking half dose on Friday. Will follow up in one month as suggested.

## 2021-03-15 RX ORDER — LEVOTHYROXINE SODIUM 0.07 MG/1
TABLET ORAL
Qty: 30 TABLET | Refills: 3 | Status: SHIPPED
Start: 2021-03-15 | End: 2021-07-14

## 2021-03-24 ENCOUNTER — TELEPHONE (OUTPATIENT)
Dept: PRIMARY CARE CLINIC | Age: 31
End: 2021-03-24

## 2021-03-24 RX ORDER — VALACYCLOVIR HYDROCHLORIDE 1 G/1
1000 TABLET, FILM COATED ORAL 2 TIMES DAILY
Qty: 4 TABLET | Refills: 1 | Status: SHIPPED
Start: 2021-03-24 | Stop reason: SDUPTHER

## 2021-03-26 ENCOUNTER — OFFICE VISIT (OUTPATIENT)
Dept: FAMILY MEDICINE CLINIC | Age: 31
End: 2021-03-26
Payer: COMMERCIAL

## 2021-03-26 VITALS
SYSTOLIC BLOOD PRESSURE: 128 MMHG | WEIGHT: 170 LBS | BODY MASS INDEX: 30.12 KG/M2 | OXYGEN SATURATION: 100 % | HEIGHT: 63 IN | TEMPERATURE: 97.3 F | RESPIRATION RATE: 18 BRPM | DIASTOLIC BLOOD PRESSURE: 80 MMHG | HEART RATE: 85 BPM

## 2021-03-26 DIAGNOSIS — B00.1 COLD SORE: ICD-10-CM

## 2021-03-26 DIAGNOSIS — J45.21 MILD INTERMITTENT ASTHMA WITH ACUTE EXACERBATION: ICD-10-CM

## 2021-03-26 DIAGNOSIS — Z76.0 MEDICATION REFILL: ICD-10-CM

## 2021-03-26 DIAGNOSIS — R05.9 COUGH: Primary | ICD-10-CM

## 2021-03-26 DIAGNOSIS — R21 RASH AND NONSPECIFIC SKIN ERUPTION: ICD-10-CM

## 2021-03-26 PROCEDURE — 99214 OFFICE O/P EST MOD 30 MIN: CPT | Performed by: PHYSICIAN ASSISTANT

## 2021-03-26 PROCEDURE — 3006F CXR DOC REV: CPT | Performed by: PHYSICIAN ASSISTANT

## 2021-03-26 RX ORDER — MONTELUKAST SODIUM 10 MG/1
10 TABLET ORAL DAILY
Qty: 30 TABLET | Refills: 0 | Status: SHIPPED
Start: 2021-03-26 | End: 2021-12-08

## 2021-03-26 RX ORDER — ALBUTEROL SULFATE 90 UG/1
2 AEROSOL, METERED RESPIRATORY (INHALATION) 4 TIMES DAILY PRN
Qty: 1 INHALER | Refills: 0 | Status: SHIPPED
Start: 2021-03-26 | End: 2021-07-06 | Stop reason: SDUPTHER

## 2021-03-26 NOTE — PROGRESS NOTES
3/26/21  Garret Rondon : 1990 Sex: female  Age 31 y.o. Subjective:  Chief Complaint   Patient presents with    Asthma     pt states asthma flare          HPI:   Mary Ann Veras , 31 y.o. female presents to express care for evaluation of asthma exacerbation, medication refill, cold sore. The patient states that she is having a little bit of an asthma flare. The patient recently had her COVID-19 vaccination. The patient states that she is also developed a cold sore. The patient had antivirals called in for her. She has some crusting around the nares. The patient is also had a deep cough with some wheezing associated. The patient is out of her albuterol inhaler and out of her Singulair. ROS:   Unless otherwise stated in this report the patient's positive and negative responses for review of systems for constitutional, eyes, ENT, cardiovascular, respiratory, gastrointestinal, neurological, , musculoskeletal, and integument systems and related systems to the presenting problem are either stated in the history of present illness or were not pertinent or were negative for the symptoms and/or complaints related to the presenting medical problem. Positives and pertinent negatives as per HPI. All others reviewed and are negative.       PMH:     Past Medical History:   Diagnosis Date    Asthma     excersise induced and allergy induced    Enlarged lymph node     at left neck, to have a bx 2018    Environmental allergies     IBS (irritable bowel syndrome)     Mitral valve prolapse     no regurgitation, with anxiety or caffeine developes palpitations,followed with Dr. Anuja Contreras, has appt with Dr. Hafsa Sanches 2018    Palpitations 2014    caffeine induced    Sore throat     frequent    Thyroid disease        Past Surgical History:   Procedure Laterality Date     SECTION      2 children     COLONOSCOPY  16    DENTAL SURGERY      extractions    MI BIOPSY OROPHARYNX Left 11/29/2018    LEFT TONGUE  [facility]OPSY performed by Neno Kwok MD at [facility] 59 OF TONSILS,12+ Y/O N/A 11/29/2018    TONSILLECTOMY performed by Neno Kwok MD at [address] [place] [address] N/A 12/6/2018    TONSILLECTOMY POSTOP HEMORRHAGE CONTROL performed by [facility]eno Kwok MD at [facility] OR       Family History   Problem Relation Age of Onset    Hypertension Mother         pulmonary hypertension     High Blood Pressure Mother     Other Mother     Heart Disease Father     Kidney Disease Father     Hypertension Father         IBS & polyps    Other Sister         gastric ulcer & IBS    Cancer Paternal Aunt 46        breast       Medications:     Current Outpatient Medications:     montelukast (SINGULAIR) 10 MG tablet, Take 1 tablet by mouth daily, Disp: 30 tablet, Rfl: 0    albuterol sulfate  (90 Base) MCG/ACT inhaler, Inhale 2 puffs into the lungs 4 times daily as needed for Wheezing, Disp: 1 Inhaler, Rfl: 0    mupirocin (BACTROBAN) 2 % ointment, Apply 3 times daily. , Disp: 30 g, Rfl: 0    valACYclovir (VALTREX) 1 g tablet, Take 1 tablet by mouth 2 times daily for 2 days, Disp: 4 tablet, Rfl: 1    levothyroxine (SYNTHROID) 75 MCG tablet, take 1 tablet by mouth once daily, Disp: 30 tablet, Rfl: 3    citalopram (CELEXA) 40 MG tablet, take 1 tablet by mouth once daily, Disp: 90 tablet, Rfl: 1    omeprazole (PRILOSEC) 20 MG delayed release capsule, TAKE 2 CAPSULES BY MOUTH ONCE DAILY IN THE MORNING, Disp: , Rfl:     Cholecalciferol 100 MCG (4000 UT) CAPS, Take by mouth, Disp: , Rfl:     Ascorbic Acid (VITAMIN C) 1000 MG tablet, Take 1,000 mg by mouth daily, Disp: , Rfl:     famotidine (PEPCID) 20 MG tablet, Take 1 tablet by mouth 2 times daily, Disp: 60 tablet, Rfl: 0    loratadine (CLARITIN) 10 MG tablet, Take 10 mg by mouth daily, Disp: , Rfl:     clonazePAM (KLONOPIN) 0.5 MG tablet, take 1 tablet by mouth twice a day if needed for anxiety, Disp: 60 tablet, Rfl: 1    Allergies: Allergies   Allergen Reactions    Cefdinir Hives     Had reaction as a child  Other reaction(s): AOF    Lomedia 24 Fe [Norethin Ace-Eth Estrad-Fe] Hives    Nutritional Supplements      Other reaction(s): AOF    Sulfa Antibiotics Hives     Joint swell       Social History:     Social History     Tobacco Use    Smoking status: Never Smoker    Smokeless tobacco: Never Used   Substance Use Topics    Alcohol use: Yes     Comment: wine once in a while    Drug use: No       Patient lives at home. Physical Exam:     Vitals:    03/26/21 1440   BP: 128/80   Pulse: 85   Resp: 18   Temp: 97.3 °F (36.3 °C)   SpO2: 100%   Weight: 170 lb (77.1 kg)   Height: 5' 3\" (1.6 m)       Exam:  Physical Exam   Nurse's notes and vital signs reviewed. The patient is not hypoxic. ? General: Alert, no acute distress, patient resting comfortably Patient is not toxic or lethargic. Skin: Warm, intact, no pallor noted. There is no evidence of rash at this time. Head: Normocephalic, atraumatic  Eye: Normal conjunctiva  Ears, Nose, Throat: Right tympanic membrane clear, left tympanic membrane clear. No drainage or discharge noted. No pre- or post-auricular tenderness, erythema, or swelling noted. No rhinorrhea or congestion noted. There is evidence of some crusting noted to the inferior portion of the nares in the midline, it does seem to be of concern for impetigo  Posterior oropharynx shows no erythema, tonsillar hypertrophy, or exudate. the uvula is midline. No trismus or drooling is noted. Moist mucous membranes. Neck: No anterior/posterior lymphadenopathy noted. No erythema, no masses, no fluctuance or induration noted. No meningeal signs. Cardiovascular: Regular Rate and Rhythm  Respiratory: No acute distress, faint wheezing noted bilateral. No stridor or retractions are noted.   Neurological: A&O x4, normal speech  Psychiatric: Cooperative         Testing:     Xr Chest Standard (2 Vw) Result Date: 3/26/2021  EXAMINATION: TWO XRAY VIEWS OF THE CHEST 3/26/2021 1:59 pm COMPARISON: August 30, 2014 HISTORY: ORDERING SYSTEM PROVIDED HISTORY: Cough TECHNOLOGIST PROVIDED HISTORY: Reason for exam:->cough FINDINGS: The lungs are without acute focal process. There is no effusion or pneumothorax. The cardiomediastinal silhouette is without acute process. The osseous structures are without acute process. No acute process. Medical Decision Making:     Vital signs reviewed    Past medical history reviewed. Allergies reviewed. Medications reviewed. Patient on arrival does not appear to be in any apparent distress or discomfort. The patient has been seen and evaluated. The patient does not appear to be toxic or lethargic. The patient was sent for chest x-ray. Chest x-ray was reviewed and I did not see any evidence of acute cardiopulmonary process. Radiology report is pending at this time. The patient really has no evidence of w rhonchi with only minimal expiratory wheezing. The patient will be given a refill of her Singulair and of her albuterol inhaler. The patient will also be given Bactroban ointment to be applied to the nares. The patient was educated on the proper dosage of motrin and tylenol and the appropriate intervals of each. The patient is to increase fluid intake over the next several days. The patient is to use OTC decongestant as needed. The patient is to return to express care or go directly to the emergency department should any of the signs or symptoms worsen. The patient is to followup with primary care physician in 2-3 days for repeat evaluation. The patient has no other questions or concerns at this time the patient will be discharged home. Clinical Impression:   Ciarra Willoughby was seen today for asthma. Diagnoses and all orders for this visit:    Cough  -     XR CHEST STANDARD (2 VW);  Future    Mild intermittent asthma with acute exacerbation Cold sore    Rash and nonspecific skin eruption    Medication refill    Other orders  -     montelukast (SINGULAIR) 10 MG tablet; Take 1 tablet by mouth daily  -     albuterol sulfate  (90 Base) MCG/ACT inhaler; Inhale 2 puffs into the lungs 4 times daily as needed for Wheezing  -     mupirocin (BACTROBAN) 2 % ointment; Apply 3 times daily. The patient is to call for any concerns or return if any of the signs or symptoms worsen. The patient is to follow-up with PCP in the next 2-3 days for repeat evaluation repeat assessment or go directly to the emergency department.      SIGNATURE: Sue Antonio III, PA-C

## 2021-03-27 ENCOUNTER — HOSPITAL ENCOUNTER (EMERGENCY)
Dept: HOSPITAL 83 - ED | Age: 31
LOS: 1 days | Discharge: HOME | End: 2021-03-28
Payer: COMMERCIAL

## 2021-03-27 VITALS — HEIGHT: 55 IN

## 2021-03-27 DIAGNOSIS — J45.901: Primary | ICD-10-CM

## 2021-03-27 DIAGNOSIS — Z79.899: ICD-10-CM

## 2021-03-27 DIAGNOSIS — Z98.890: ICD-10-CM

## 2021-03-27 DIAGNOSIS — Z88.8: ICD-10-CM

## 2021-03-28 LAB
ALBUMIN SERPL-MCNC: 3.6 GM/DL (ref 3.1–4.5)
ALP SERPL-CCNC: 81 U/L (ref 45–117)
ALT SERPL W P-5'-P-CCNC: 45 U/L (ref 12–78)
AST SERPL-CCNC: 32 IU/L (ref 3–35)
BASOPHILS # BLD AUTO: 0.1 10*3/UL (ref 0–0.1)
BASOPHILS NFR BLD AUTO: 0.6 % (ref 0–1)
BUN SERPL-MCNC: 10 MG/DL (ref 7–24)
CHLORIDE SERPL-SCNC: 106 MMOL/L (ref 98–107)
CREAT SERPL-MCNC: 0.62 MG/DL (ref 0.55–1.02)
EOSINOPHIL # BLD AUTO: 0.2 10*3/UL (ref 0–0.4)
EOSINOPHIL # BLD AUTO: 2.2 % (ref 1–4)
ERYTHROCYTE [DISTWIDTH] IN BLOOD BY AUTOMATED COUNT: 11.9 % (ref 0–14.5)
HCT VFR BLD AUTO: 37.4 % (ref 37–47)
LYMPHOCYTES # BLD AUTO: 1.8 10*3/UL (ref 1.3–4.4)
LYMPHOCYTES NFR BLD AUTO: 22.2 % (ref 27–41)
MCH RBC QN AUTO: 28.8 PG (ref 27–31)
MCHC RBC AUTO-ENTMCNC: 32.9 G/DL (ref 33–37)
MCV RBC AUTO: 87.6 FL (ref 81–99)
MONOCYTES # BLD AUTO: 0.9 10*3/UL (ref 0.1–1)
MONOCYTES NFR BLD MANUAL: 10.8 % (ref 3–9)
NEUT #: 5.3 10*3/UL (ref 2.3–7.9)
NEUT %: 64.1 % (ref 47–73)
NRBC BLD QL AUTO: 0 % (ref 0–0)
PLATELET # BLD AUTO: 206 10*3/UL (ref 130–400)
PMV BLD AUTO: 10.6 FL (ref 9.6–12.3)
POTASSIUM SERPL-SCNC: 3.7 MMOL/L (ref 3.5–5.1)
PROT SERPL-MCNC: 7.6 GM/DL (ref 6.4–8.2)
RBC # BLD AUTO: 4.27 10*6/UL (ref 4.1–5.1)
SODIUM SERPL-SCNC: 137 MMOL/L (ref 136–145)
WBC NRBC COR # BLD AUTO: 8.2 10*3/UL (ref 4.8–10.8)

## 2021-03-30 ENCOUNTER — VIRTUAL VISIT (OUTPATIENT)
Dept: PRIMARY CARE CLINIC | Age: 31
End: 2021-03-30
Payer: COMMERCIAL

## 2021-03-30 DIAGNOSIS — E03.9 ACQUIRED HYPOTHYROIDISM: ICD-10-CM

## 2021-03-30 DIAGNOSIS — R53.83 FATIGUE, UNSPECIFIED TYPE: ICD-10-CM

## 2021-03-30 DIAGNOSIS — E55.9 VITAMIN D DEFICIENCY: ICD-10-CM

## 2021-03-30 DIAGNOSIS — J40 BRONCHITIS: Primary | ICD-10-CM

## 2021-03-30 PROCEDURE — 99213 OFFICE O/P EST LOW 20 MIN: CPT | Performed by: FAMILY MEDICINE

## 2021-03-30 RX ORDER — PREDNISONE 20 MG/1
20 TABLET ORAL 2 TIMES DAILY
Qty: 10 TABLET | Refills: 0 | Status: SHIPPED | OUTPATIENT
Start: 2021-03-30 | End: 2021-04-04

## 2021-03-30 RX ORDER — AZITHROMYCIN 250 MG/1
250 TABLET, FILM COATED ORAL SEE ADMIN INSTRUCTIONS
Qty: 6 TABLET | Refills: 0 | Status: SHIPPED | OUTPATIENT
Start: 2021-03-30 | End: 2021-04-04

## 2021-03-30 ASSESSMENT — ENCOUNTER SYMPTOMS
COLOR CHANGE: 0
WHEEZING: 0
SORE THROAT: 0
CONSTIPATION: 0
SINUS PRESSURE: 0
NAUSEA: 0
EYE PAIN: 0
EYE ITCHING: 0
ABDOMINAL PAIN: 0
EYE REDNESS: 0
DIARRHEA: 0
SHORTNESS OF BREATH: 0
RHINORRHEA: 0
COUGH: 1
APNEA: 0
CHEST TIGHTNESS: 0
BACK PAIN: 0
DIFFICULTY BREATHING: 1
BLOOD IN STOOL: 0
VOMITING: 0

## 2021-03-30 NOTE — PROGRESS NOTES
TeleMedicine Video Visit    Mee Hazel, was evaluated through a synchronous (real-time) audio-video encounter. The patient (or guardian if applicable) is aware that this is a billable service. Verbal consent to proceed has been obtained within the past 12 months. The visit was conducted pursuant to the emergency declaration under the [facility], [address] [place] [address] and the [employer] and [employer] General Act. Patient identification was verified, and a caregiver was present when appropriate. The patient was located in a state where the provider was credentialed to provide care. Patient identification was verified at the start of the visit, including the patient's telephone number and physical location. I discussed with the patient the nature of our telehealth visits, that:     1. Due to the nature of an audio- video modality, the only components of a physical exam that could be done are the elements supported by direct observation. 2. I would evaluate the patient and recommend diagnostics and treatments based on my assessment. 3. If it was felt that the patient should be evaluated in clinic or an emergency room setting, then they would be directed there. 4. Our sessions are not being recorded and that personal health information is protected. 5. Our team would provide follow up care in person if/when the patient needs it. Patient's location: home address in [facility]  Physician  location other address in [facility] other people involved in call  n/a      On this date 3/30/2021 I have spent 20 minutes reviewing previous notes, test results and face to face (virtual) with the patient discussing the diagnosis and importance of compliance with the treatment plan as well as documenting on the day of the visit. This visit was completed virtually using D[employer]. me        Chief Complaint:     Chief Complaint   Patient presents with   Robert Schultz Asthma     was told she has accute bronchitis, did get covid shot. Asthma  She complains of cough and difficulty breathing (exercise induced). There is no shortness of breath or wheezing. This is a recurrent problem. The current episode started in the past 7 days. The problem occurs daily. The problem has been unchanged. Pertinent negatives include no appetite change, chest pain, ear pain, fever, headaches, myalgias, rhinorrhea or sore throat. Her past medical history is significant for asthma.        Patient Active Problem List   Diagnosis    Fatigue    Acquired hypothyroidism    Vitamin D deficiency    Gastroesophageal reflux disease without esophagitis    S/P tonsillectomy    Myalgia    Anxiety    Breast mass, left    Seasonal allergies    Mixed hyperlipidemia       Past Medical History:   Diagnosis Date    Asthma     excersise induced and allergy induced    Enlarged lymph node     at left neck, to have a bx 2018    Environmental allergies     IBS (irritable bowel syndrome)     Mitral valve prolapse     no regurgitation, with anxiety or caffeine developes palpitations,followed with Dr. Tracey Nino, has appt with Dr. Alessandra Martinez 2018    Palpitations 2014    caffeine induced    Sore throat     frequent    Thyroid disease        Past Surgical History:   Procedure Laterality Date     SECTION      2 children     COLONOSCOPY  16    DENTAL SURGERY      extractions    MI BIOPSY OROPHARYNX Left 2018    LEFT TONGUE  BIOPSY performed by Gloria Russo MD at [facility] 59 OF TONSILS,12+ Y/O N/A 2018    TONSILLECTOMY performed by Gloria Russo MD at [address] [place] [address] N/A 2018    TONSILLECTOMY POSTOP HEMORRHAGE CONTROL performed by Gloria Russo MD at [facility] OR       Current Outpatient Medications   Medication Sig Dispense Refill    predniSONE (DELTASONE) 20 MG tablet Take 1 tablet by mouth 2 times daily for 5 days 10 tablet 0    azithromycin (ZITHROMAX) 250 MG tablet Take 1 tablet by mouth See Admin Instructions for 5 days 500mg on day 1 followed by 250mg on days 2 - 5 6 tablet 0    montelukast (SINGULAIR) 10 MG tablet Take 1 tablet by mouth daily 30 tablet 0    albuterol sulfate  (90 Base) MCG/ACT inhaler Inhale 2 puffs into the lungs 4 times daily as needed for Wheezing 1 Inhaler 0    mupirocin (BACTROBAN) 2 % ointment Apply 3 times daily. 30 g 0    levothyroxine (SYNTHROID) 75 MCG tablet take 1 tablet by mouth once daily 30 tablet 3    citalopram (CELEXA) 40 MG tablet take 1 tablet by mouth once daily 90 tablet 1    omeprazole (PRILOSEC) 20 MG delayed release capsule TAKE 2 CAPSULES BY MOUTH ONCE DAILY IN THE MORNING      Cholecalciferol 100 MCG (4000 UT) CAPS Take by mouth      Ascorbic Acid (VITAMIN C) 1000 MG tablet Take 1,000 mg by mouth daily      famotidine (PEPCID) 20 MG tablet Take 1 tablet by mouth 2 times daily 60 tablet 0    loratadine (CLARITIN) 10 MG tablet Take 10 mg by mouth daily      clonazePAM (KLONOPIN) 0.5 MG tablet take 1 tablet by mouth twice a day if needed for anxiety 60 tablet 1     No current facility-administered medications for this visit.         Allergies   Allergen Reactions    Cefdinir Hives     Had reaction as a child  Other reaction(s): AOF    Lomedia 24 Fe [Norethin Ace-Eth Estrad-Fe] Hives    Nutritional Supplements      Other reaction(s): AOF    Sulfa Antibiotics Hives     Joint swell       Social History     Socioeconomic History    Marital status:      Spouse name: None    Number of children: None    Years of education: None    Highest education level: None   Occupational History     Employer: [employer]     Employer: [employer]   Social Needs    Financial resource strain: None    Food insecurity     Worry: None     Inability: None    Transportation needs     Medical: None     Non-medical: None   Tobacco Use    Smoking status: Never Smoker    Smokeless tobacco: Never Used   Substance and Sexual Activity    Alcohol use: Yes     Comment: wine once in a while    Drug use: No    Sexual activity: None   Lifestyle    Physical activity     Days per week: None     Minutes per session: None    Stress: None   Relationships    Social connections     Talks on phone: None     Gets together: None     Attends Oriental orthodox service: None     Active member of club or organization: None     Attends meetings of clubs or organizations: None     Relationship status: None    Intimate partner violence     Fear of current or ex partner: None     Emotionally abused: None     Physically abused: None     Forced sexual activity: None   Other Topics Concern    None   Social History Narrative    None       Family History   Problem Relation Age of Onset    Hypertension Mother         pulmonary hypertension     High Blood Pressure Mother     Other Mother     Heart Disease Father     Kidney Disease Father     Hypertension Father         IBS & polyps    Other Sister         gastric ulcer & IBS    Cancer Paternal Aunt 46        breast          Review of Systems   Constitutional: Negative for activity change, appetite change, fatigue and fever. HENT: Negative for congestion, ear pain, hearing loss, nosebleeds, rhinorrhea, sinus pressure and sore throat. Eyes: Negative for pain, redness, itching and visual disturbance. Respiratory: Positive for cough. Negative for apnea, chest tightness, shortness of breath and wheezing. Cardiovascular: Negative for chest pain, palpitations and leg swelling. Gastrointestinal: Negative for abdominal pain, blood in stool, constipation, diarrhea, nausea and vomiting. Endocrine: Negative. Genitourinary: Negative for decreased urine volume, difficulty urinating, dysuria, frequency, hematuria and urgency. Musculoskeletal: Negative for arthralgias, back pain, gait problem, myalgias and neck pain.    Skin: Negative for color change and rash. Allergic/Immunologic: Negative for environmental allergies and food allergies. Neurological: Negative for dizziness, weakness, light-headedness, numbness and headaches. Hematological: Negative for adenopathy. Does not bruise/bleed easily. Psychiatric/Behavioral: Negative for behavioral problems, dysphoric mood and sleep disturbance. The patient is not nervous/anxious and is not hyperactive. All other systems reviewed and are negative. Patient-Reported Vitals 12/22/2020   Patient-Reported Weight 176lb   Patient-Reported Height 5 3      There were no vitals taken for this visit. Physical Exam  Vitals signs and nursing note reviewed. Constitutional:       General: She is not in acute distress. Appearance: Normal appearance. She is not toxic-appearing. Pulmonary:      Effort: Pulmonary effort is normal. No respiratory distress. Neurological:      Mental Status: She is alert and oriented to person, place, and time. Psychiatric:         Attention and Perception: Attention normal.         Mood and Affect: Mood and affect normal.         Speech: Speech normal.                                 ASSESSMENT/PLAN:    Patient Active Problem List   Diagnosis    Fatigue    Acquired hypothyroidism    Vitamin D deficiency    Gastroesophageal reflux disease without esophagitis    S/P tonsillectomy    Myalgia    Anxiety    Breast mass, left    Seasonal allergies    Mixed hyperlipidemia       Garret was seen today for asthma. Diagnoses and all orders for this visit:    Bronchitis  -     azithromycin (ZITHROMAX) 250 MG tablet; Take 1 tablet by mouth See Admin Instructions for 5 days 500mg on day 1 followed by 250mg on days 2 - 5    Acquired hypothyroidism  -     TSH without Reflex; Future  -     T4, Free; Future    Fatigue, unspecified type  -     TSH without Reflex; Future  -     T4, Free; Future  -     Vitamin B12 & Folate;  Future    Vitamin D deficiency  - Vitamin D 25 Hydroxy; Future    Other orders  -     predniSONE (DELTASONE) 20 MG tablet; Take 1 tablet by mouth 2 times daily for 5 days          Return if symptoms worsen or fail to improve. I spent 15 minutes with this patient. I spent greater than 50% of the time counseling this patient.         Aide Silva DO  3/30/2021  9:07 AM

## 2021-04-02 DIAGNOSIS — E55.9 VITAMIN D DEFICIENCY: ICD-10-CM

## 2021-04-02 DIAGNOSIS — E03.9 ACQUIRED HYPOTHYROIDISM: ICD-10-CM

## 2021-04-02 DIAGNOSIS — R53.83 FATIGUE, UNSPECIFIED TYPE: ICD-10-CM

## 2021-04-02 LAB
FOLATE: >20 NG/ML (ref 4.8–24.2)
T4 FREE: 1.22 NG/DL (ref 0.93–1.7)
TSH SERPL DL<=0.05 MIU/L-ACNC: 1.26 UIU/ML (ref 0.27–4.2)
VITAMIN B-12: 1056 PG/ML (ref 211–946)
VITAMIN D 25-HYDROXY: 34 NG/ML (ref 30–100)

## 2021-04-20 ENCOUNTER — TELEPHONE (OUTPATIENT)
Dept: PRIMARY CARE CLINIC | Age: 31
End: 2021-04-20

## 2021-04-20 RX ORDER — FLUCONAZOLE 100 MG/1
100 TABLET ORAL DAILY
Qty: 3 TABLET | Refills: 0 | Status: SHIPPED | OUTPATIENT
Start: 2021-04-20 | End: 2021-04-23

## 2021-04-20 NOTE — TELEPHONE ENCOUNTER
You placed the pt on an antibiotic at the end of March, and now she has a yeast infection.  She is calling to see if you could send her a script for diflucan over to the pharmacy for her

## 2021-04-21 ENCOUNTER — TELEPHONE (OUTPATIENT)
Dept: BREAST CENTER | Age: 31
End: 2021-04-21

## 2021-04-21 NOTE — TELEPHONE ENCOUNTER
Patient called to let us know she noticed a tiny amount of bilateral clear/milky nipple discharge starting yesterday. She states she is currently ovulating, her breasts are engorged and tender. No other breast changes noted. Discussed options of monitoring her discharge and if it continues into her next cycle we can order imaging and a clinical follow up, or bring her in now for imaging and a follow up if she feels better doing so. Patient agrees to continue monitoring and call us if anything changes or continues into her next cycle. Will notify Dr. Chao Burdick of this update to see if there are any other recommendations at this time.

## 2021-04-28 DIAGNOSIS — N64.52 NIPPLE DISCHARGE: Primary | ICD-10-CM

## 2021-04-28 ASSESSMENT — ENCOUNTER SYMPTOMS
DIARRHEA: 0
WHEEZING: 0
SORE THROAT: 0
RHINORRHEA: 0
VOMITING: 0
BLOOD IN STOOL: 0
BACK PAIN: 0
NAUSEA: 0
ABDOMINAL PAIN: 0
COUGH: 0
CHOKING: 0
EYE ITCHING: 0
SINUS PAIN: 0
TROUBLE SWALLOWING: 0
CHEST TIGHTNESS: 0
VOICE CHANGE: 0
SHORTNESS OF BREATH: 0
ABDOMINAL DISTENTION: 0
CONSTIPATION: 0
EYE DISCHARGE: 0
SINUS PRESSURE: 0

## 2021-04-28 NOTE — TELEPHONE ENCOUNTER
Patient called with concerns that her bilateral nipple discharge is still present and seems like it's increased in amount. Bilateral ultrasounds with office visit scheduled Friday 4/30/21. Patient agrees with plan.

## 2021-04-28 NOTE — PROGRESS NOTES
Subjective:  Stable oval solid mass with partially defined margins measuring 17 x 6 x 9 mm on recent CT scan seen in the left breast at 8 o'clock located 3 centimeters from the nipple. This note was copied forward from the last encounter. Essential components for this patient record were reviewed and verified on this visit including:  recent hospitalizations, recent imaging, PMH, PSH, FH, SOC HX, Allergies, and Medications were reviewed and updated as appropriate. In addition, the assessment and plan were copied from prior office note and updated accordingly. Patient ID: Candis Perales is a 31 y.o. female. HPI     Date: 2021    [facility] AT THE [facility] presents for evaluation of new bilateral milky nipple discharge. History of right breast upper outer quadrant nodularity that has changed in size.     PCP: Brad Reich DO. Gynecologist: Dr. Rikki Storey     History of fibroadenoma of the breast.  C/o milky nipple discharge over the past 1-2 weeks. Reports it is spontaneous and bilateral.  Patient does routinely do self breast exams. Patient has  previous breast biopsy  @ [facility] (Benign). Patient denies a personal history of breast cancer. Breast cancer risk factors include Paternal aunt with breast cancer in her early 52's;  Currently age late 52's   Age of menarche was 8. Patient is . Age of first live birth was 23. Patient did breast feed.     Estimated body mass index is 29.41 kg/m² as calculated from the following:    Height as of 18: 5' 3\" (1.6 m). Weight as of 18: 166 lb (75.3 kg). Bra Size: 38D        Bilateral breast US 2018 @ [address] [place] [address] Dr Mariano  Left Ultrasound   HISTORY:   The patient has no personal history of cancer. The patient has the following family history of breast cancer:  paternal aunt, at age 48.  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       ULTRASOUND TECHNIQUE:   COMPARISON:   No prior imaging studies are available for comparison.       ULTRASOUND FINDINGS:   Finding 1:   Sonography was performed in the left breast using a radial and anti-radial approach. There is an oval solid mass with partially defined margins measuring 13 x 6 x 8 mm seen in the left breast at 8 o'clock located 3 centimeters from the nipple. Internal    echogenicity is hypoechoic.       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       It contains a clip which correlates with history of prior biopsy.       No sonographic evidence of suspicious solid or cystic mass lesions.  No focal areas of suspicious atypical echogenicity.       IMPRESSION:   Solid mass in the left breast is benign.       Screening mammogram at age 38 is recommended.       =======================================   BI-RADS Category 2:  Benign   =======================================      -03/14/2018 right breast US:  Negative, BI-RADS 1.  -09/24/2018 left breast ultrasound noted stable solid mass left breast at the 8 o'clock position and 5 cm from the nipple consistent with fibroadenoma. BI-RADS Category 2.  -09/24/18, Right breast ultrasound, [facility]: Negative, BI-RADS 1.    1/24/19 - CT Soft Tissue Neck [facility]:     CONCLUSION:   1. A few mildly prominent cervical nodes are seen in the anterior   upper neck bilaterally as described above. These are considered   nonspecific. These are probably reactive. In the anterior   mediastinum/posterior to the manubrium sternum there is a 2.7 x 1.8 cm   ill-defined soft tissue density which could represent residual thymic   tissue in this young patient. If there is palpable lymphadenopathy   elsewhere, follow-up CT of the chest, abdomen and pelvis is   recommended for further evaluation. 2. Otherwise negative CT of the neck.         -01/31/19 - CT [facility]:     Impression       1.  Findings seen on prior examination of neck soft tissue from January 24, 2019 seen posterior to the manubrium represents normal left   brachiocephalic vein.       2. No evidence of mediastinal or hilar lymphadenopathy.       3. Note made of a circumscribed, ovoid nodule seen in medial left   breast measures 17 x 9 mm possibly representing fibroadenoma. Correlation recommended. Ultrasound may be helpful for further   evaluation. 18 tonsillectomy, tongue biopsy; benign. Diagnosis:  A.  Bilateral tonsils, tonsillectomy: Tonsillar tissue with reactive  follicular lymphoid hyperplasia. Aggregates of bacteria morphologically compatible with Actinomyces  species are present within tonsillar crypts of one tonsil. B.  Left tongue, biopsy: Lingual tonsil tissue with reactive follicular  lymphoid hyperplasia. Complicated by re-bleed X2.     -2020 - Right breast ultrasound - [facility]: Negative for malignancy. -10/27/20 right breast US: Negative.      Past Medical History:   Diagnosis Date    Asthma     excersise induced and allergy induced    Enlarged lymph node     at left neck, to have a bx 2018    Environmental allergies     IBS (irritable bowel syndrome)     Mitral valve prolapse     no regurgitation, with anxiety or caffeine developes palpitations,followed with Dr. Eusebia Lopez, has appt with Dr. Erasto Bowles 2018    Palpitations 2014    caffeine induced    Sore throat     frequent    Thyroid disease      Past Surgical History:   Procedure Laterality Date     SECTION      2 children     COLONOSCOPY  16    DENTAL SURGERY      extractions    [place] BIOPSY OROPHARYNX Left 2018    LEFT TONGUE  BIOPSY performed by Romel Peterson MD at [facility] 61 OF TONSILS,12+ Y/O N/A 2018    TONSILLECTOMY performed by Romel Peterson MD at [facility] 57 N/A 2018    TONSILLECTOMY POSTOP HEMORRHAGE CONTROL performed by Romel Peterson MD at [facility] History     Socioeconomic History    Marital status:      Spouse name: Not on file    Number of children: Not on file    Years of education: Not on file    Highest education level: Not on file   Occupational History     Employer: [employer]     Employer: [employer] Financial resource strain: Not on file    Food insecurity     Worry: Not on file     Inability: Not on file    Transportation needs     Medical: Not on file     Non-medical: Not on file   Tobacco Use    Smoking status: Never Smoker    Smokeless tobacco: Never Used   Substance and Sexual Activity    Alcohol use: Yes     Comment: wine once in a while    Drug use: No    Sexual activity: Not on file   Lifestyle    Physical activity     Days per week: Not on file     Minutes per session: Not on file    Stress: Not on file   Relationships    Social connections     Talks on phone: Not on file     Gets together: Not on file     Attends [employer] service: Not on file     Active member of club or organization: Not on file     Attends meetings of clubs or organizations: Not on file     Relationship status: Not on file    Intimate partner violence     Fear of current or ex partner: Not on file     Emotionally abused: Not on file     Physically abused: Not on file     Forced sexual activity: Not on file   Other Topics Concern    Not on file   Social History Narrative    Not on file     Allergies   Allergen Reactions    Cefdinir Hives     Had reaction as a child  Other reaction(s): AOF    Lomedia 24 Fe [Norethin Ace-Eth Estrad-Fe] Hives    Nutritional Supplements      Other reaction(s): AOF    Sulfa Antibiotics Hives     Joint swell     Current Outpatient Medications on File Prior to Visit   Medication Sig Dispense Refill    montelukast (SINGULAIR) 10 MG tablet Take 1 tablet by mouth daily 30 tablet 0    albuterol sulfate  (90 Base) MCG/ACT inhaler Inhale 2 puffs into the lungs 4 times daily as needed for Wheezing 1 Inhaler 0    mupirocin (BACTROBAN) 2 % ointment Apply 3 times daily. 30 g 0    levothyroxine (SYNTHROID) 75 MCG tablet take 1 tablet by mouth once daily 30 tablet 3    citalopram (CELEXA) 40 MG tablet take 1 tablet by mouth once daily 90 tablet 1    omeprazole (PRILOSEC) 20 MG delayed release capsule TAKE 2 CAPSULES BY MOUTH ONCE DAILY IN THE MORNING      clonazePAM (KLONOPIN) 0.5 MG tablet take 1 tablet by mouth twice a day if needed for anxiety 60 tablet 1    Cholecalciferol 100 MCG (4000 UT) CAPS Take by mouth      Ascorbic Acid (VITAMIN C) 1000 MG tablet Take 1,000 mg by mouth daily      famotidine (PEPCID) 20 MG tablet Take 1 tablet by mouth 2 times daily 60 tablet 0    loratadine (CLARITIN) 10 MG tablet Take 10 mg by mouth daily       No current facility-administered medications on file prior to visit. Review of Systems   Constitutional: Negative for activity change, appetite change, chills, fatigue, fever and unexpected weight change. Doing well; c/o bilateral milky nipple discharge. HENT: Negative for congestion, postnasal drip, rhinorrhea, sinus pressure, sinus pain, sore throat, trouble swallowing and voice change. Eyes: Negative for discharge, itching and visual disturbance. Respiratory: Negative for cough, choking, chest tightness, shortness of breath and wheezing. Cardiovascular: Negative for chest pain, palpitations and leg swelling. Gastrointestinal: Negative for abdominal distention, abdominal pain, blood in stool, constipation, diarrhea, nausea and vomiting. Endocrine: Negative for cold intolerance and heat intolerance. Genitourinary: Negative for difficulty urinating, dysuria, frequency and hematuria. Musculoskeletal: Negative for arthralgias, back pain, gait problem, joint swelling, myalgias, neck pain and neck stiffness. Allergic/Immunologic: Negative for environmental allergies and food allergies.    Neurological: Negative for dizziness, seizures, syncope, speech difficulty, weakness, light-headedness and headaches. Hematological: Negative for adenopathy. Does not bruise/bleed easily. Psychiatric/Behavioral: Negative for agitation, confusion and decreased concentration. The patient is not nervous/anxious. Objective:   Physical Exam  Vitals signs and nursing note reviewed. Constitutional:       General: She is not in acute distress. Appearance: She is well-developed. She is not diaphoretic. Comments: ECOG 0   HENT:      Head: Normocephalic and atraumatic. Mouth/Throat:      Pharynx: No oropharyngeal exudate. Eyes:      General: No scleral icterus. Right eye: No discharge. Left eye: No discharge. Conjunctiva/sclera: Conjunctivae normal.   Neck:      Musculoskeletal: Normal range of motion and neck supple. Thyroid: No thyromegaly. Vascular: No JVD. Trachea: No tracheal deviation. Cardiovascular:      Rate and Rhythm: Normal rate and regular rhythm. Heart sounds: No murmur. No friction rub. No gallop. Pulmonary:      Effort: Pulmonary effort is normal. No respiratory distress or retractions. Breath sounds: Normal breath sounds. No stridor. No wheezing or rales. Chest:      Chest wall: No mass, lacerations, deformity, swelling, tenderness or edema. Breasts: Breasts are symmetrical.         Right: Mass present. No inverted nipple, nipple discharge, skin change or tenderness. Left: No inverted nipple, mass, nipple discharge, skin change or tenderness. Comments: Breasts are with age-appropriate density bilaterally. No skin dimpling or puckering. No nipple discharge appreciated on this exam with moderate pressure. No clinically suspicious lumps nodules or masses appreciated. No axillary lymphadenopathy. Abdominal:      General: There is no distension. Palpations: Abdomen is soft. Tenderness: There is no abdominal tenderness. There is no guarding or rebound. Musculoskeletal: Normal range of motion. General: No tenderness or deformity. Right shoulder: Normal.      Left shoulder: Normal.   Lymphadenopathy:      Cervical: No cervical adenopathy. Right cervical: No superficial, deep or posterior cervical adenopathy. Left cervical: No superficial, deep or posterior cervical adenopathy. Upper Body:      Right upper body: No pectoral adenopathy. Left upper body: No pectoral adenopathy. Skin:     General: Skin is warm and dry. Coloration: Skin is not pale. Findings: No erythema or rash. Neurological:      Mental Status: She is alert and oriented to person, place, and time. Coordination: Coordination normal.   Psychiatric:         Behavior: Behavior normal.         Thought Content: Thought content normal.         Judgment: Judgment normal.        Assessment:      Iran Jeans is an extremely pleasant 31 y.o. female with FH of breast cancer in paternal aunt in her early 52's (currently late 52's). She has a history of US core biopsy proven left breast fibroadenoma done at [facility] in 2011.        -03/14/2018 left breast ultrasound@ [facility]: Oval solid mass with partially defined margins measuring 13 x 6 x 8 mm in the left breast 8 o'clock position and 3 cm from the nipple. Finding most consistent with fibroadenoma. Benign, BI-RADS 2.     -03/14/2018 right breast ultrasound: Negative BI-RADS 1.    -09/24/18, Left breast ultrasound, [facility]: Stable solid mass measuring 18 x 8 x 11 mm in the left breast 8 o'clock position, 5 cm from the nipple. Most consistent with fibroadenoma.    [facility].      1/24/19 - CT Soft Tissue Neck [facility]:  CONCLUSION:   1. A few mildly prominent cervical nodes are seen in the anterior   upper neck bilaterally as described above. These are considered   nonspecific. These are probably reactive.  In the anterior   mediastinum/posterior to the manubrium sternum there is a 2.7 x 1.8 cm   ill-defined soft tissue density which could represent residual thymic   tissue in this young patient. If there is palpable lymphadenopathy   elsewhere, follow-up CT of the chest, abdomen and pelvis is   recommended for further evaluation. 2. Otherwise negative CT of the neck.           -01/31/19 - CT Chest - [facility]: Findings noted on prior exam of neck soft tissue from January 24, 2020 2019 posterior to the manubrium represent normal brachiocephalic vein; no evidence of mediastinal or hilar lymphadenopathy; oval nodule in medial left breast measuring 17 x 9 mm possibly representing fibroadenoma.    -01/20/2020 - Right breast ultrasound - [facility]: Negative, BI-RADS 1.    -10/27/2020 - Right breast ultrasound - [facility]: Negative, BI-RADS 1  -04/30/2021 presents for bilateral milky nipple discharge.    -Labs drawn on 4/29/2021, Essentially normal for her current premenstrual state: Prolactin 17.93 (4.79-23.3 ng/mL); LH4.3; FSH 4.8; TSH 0.701 (0.270-4.200).    -04/30/2021 - Bilateral breast ultrasound - [facility]: Negative, BI-RADS 1.  -04/30/2021 clinical follow-up with stable clinical exam.  There was no nipple discharge appreciated on this exam.  We reviewed imaging, including BI-RADS result. We also reviewed labs drawn on 4/29/2020 which were essentially normal.  Most likely her benign galactorrhea is a result of her intense diet and work-out regimen combined with lack of a supportive bra. There is no indication for surgical intervention or additional work-up at this time. Her TC score is 16%, placing her at slightly above average, but not high risk. Reviewed her risk score as well as NCCN guidelines for breast imaging including, mammogram at age 38. Reviewed importance of good supportive bra. Plan:       Continue monthly breast self examination; detailed instructions reviewed today. Bring any changes to your physician's attention.   1. Continue healthy diet and exercise routinely as tolerated. 2. Avoid alcohol or limit alcohol intake to < 3 drinks per week. 3. Limit caffeine intake. 4. Avoid manual compression of the nipple. 5. Wear good support bra at all times when awake. Discussed resources for purchasing supportive bra. 6. Report spontaneous or bloody nipple discharge. 7. Report nipple discharge if it occurs on one side only. 8. Repeat mammogram at age 38. .  9. Return for new or worsening symptoms. During today's visit, face-to-face time 25 minutes, greater than 50% in counseling education and coordination of care. All questions were answered to her apparent satisfaction, and she is agreeable to the plan as outlined above. Payam Watts, RN, MSN, APRN-CNP, [address] [place]  Advanced Oncology Certified Nurse Practitioner  Department of Breast Surgery  [facility]/  [facility] in collaboration with Dr. Karlene Finnegan.  Juani/Dr. Kelley Pretty APRN-CNP

## 2021-04-29 ENCOUNTER — OFFICE VISIT (OUTPATIENT)
Dept: PRIMARY CARE CLINIC | Age: 31
End: 2021-04-29
Payer: COMMERCIAL

## 2021-04-29 VITALS
HEIGHT: 63 IN | DIASTOLIC BLOOD PRESSURE: 64 MMHG | WEIGHT: 180.8 LBS | OXYGEN SATURATION: 98 % | RESPIRATION RATE: 16 BRPM | BODY MASS INDEX: 32.04 KG/M2 | TEMPERATURE: 97.6 F | HEART RATE: 100 BPM | SYSTOLIC BLOOD PRESSURE: 122 MMHG

## 2021-04-29 DIAGNOSIS — N64.52 DISCHARGE FROM BREAST: Primary | ICD-10-CM

## 2021-04-29 DIAGNOSIS — N64.52 DISCHARGE FROM BREAST: ICD-10-CM

## 2021-04-29 LAB
ALBUMIN SERPL-MCNC: 4.1 G/DL (ref 3.5–5.2)
ALP BLD-CCNC: 60 U/L (ref 35–104)
ALT SERPL-CCNC: 16 U/L (ref 0–32)
ANION GAP SERPL CALCULATED.3IONS-SCNC: 12 MMOL/L (ref 7–16)
AST SERPL-CCNC: 23 U/L (ref 0–31)
BILIRUB SERPL-MCNC: 0.2 MG/DL (ref 0–1.2)
BUN BLDV-MCNC: 18 MG/DL (ref 6–20)
CALCIUM SERPL-MCNC: 9 MG/DL (ref 8.6–10.2)
CHLORIDE BLD-SCNC: 104 MMOL/L (ref 98–107)
CO2: 24 MMOL/L (ref 22–29)
CREAT SERPL-MCNC: 0.6 MG/DL (ref 0.5–1)
FOLLICLE STIMULATING HORMONE: 4.8 MIU/ML
GFR AFRICAN AMERICAN: >60
GFR NON-AFRICAN AMERICAN: >60 ML/MIN/1.73
GLUCOSE BLD-MCNC: 89 MG/DL (ref 74–99)
LUTEINIZING HORMONE: 4.3 MIU/ML
POTASSIUM SERPL-SCNC: 4.2 MMOL/L (ref 3.5–5)
PROLACTIN: 17.93 NG/ML
SODIUM BLD-SCNC: 140 MMOL/L (ref 132–146)
TOTAL PROTEIN: 7.5 G/DL (ref 6.4–8.3)
TSH SERPL DL<=0.05 MIU/L-ACNC: 0.7 UIU/ML (ref 0.27–4.2)

## 2021-04-29 PROCEDURE — 99213 OFFICE O/P EST LOW 20 MIN: CPT | Performed by: FAMILY MEDICINE

## 2021-04-29 ASSESSMENT — ENCOUNTER SYMPTOMS
APNEA: 0
SINUS PRESSURE: 0
RHINORRHEA: 0
SORE THROAT: 0
VISUAL CHANGE: 0
EYE REDNESS: 0
CONSTIPATION: 0
CHEST TIGHTNESS: 0
EYE PAIN: 0
DIARRHEA: 0
ABDOMINAL PAIN: 0
NAUSEA: 0
BLOOD IN STOOL: 0
COUGH: 0
COLOR CHANGE: 0
EYE ITCHING: 0
BACK PAIN: 0
SHORTNESS OF BREATH: 0
VOMITING: 0
WHEEZING: 0

## 2021-04-29 NOTE — PROGRESS NOTES
Dispense Refill    montelukast (SINGULAIR) 10 MG tablet Take 1 tablet by mouth daily 30 tablet 0    albuterol sulfate  (90 Base) MCG/ACT inhaler Inhale 2 puffs into the lungs 4 times daily as needed for Wheezing 1 Inhaler 0    mupirocin (BACTROBAN) 2 % ointment Apply 3 times daily. 30 g 0    levothyroxine (SYNTHROID) 75 MCG tablet take 1 tablet by mouth once daily 30 tablet 3    citalopram (CELEXA) 40 MG tablet take 1 tablet by mouth once daily 90 tablet 1    omeprazole (PRILOSEC) 20 MG delayed release capsule TAKE 2 CAPSULES BY MOUTH ONCE DAILY IN THE MORNING      Cholecalciferol 100 MCG (4000 UT) CAPS Take by mouth      Ascorbic Acid (VITAMIN C) 1000 MG tablet Take 1,000 mg by mouth daily      famotidine (PEPCID) 20 MG tablet Take 1 tablet by mouth 2 times daily 60 tablet 0    loratadine (CLARITIN) 10 MG tablet Take 10 mg by mouth daily      clonazePAM (KLONOPIN) 0.5 MG tablet take 1 tablet by mouth twice a day if needed for anxiety 60 tablet 1     No current facility-administered medications for this visit.         Allergies   Allergen Reactions    Cefdinir Hives     Had reaction as a child  Other reaction(s): AOF    Lomedia 24 Fe [Norethin Ace-Eth Estrad-Fe] Hives    Nutritional Supplements      Other reaction(s): AOF    Sulfa Antibiotics Hives     Joint swell       Social History     Socioeconomic History    Marital status:      Spouse name: None    Number of children: None    Years of education: None    Highest education level: None   Occupational History     Employer: [employer]     Employer: [employer]   Social Needs    Financial resource strain: None    Food insecurity     Worry: None     Inability: None    Transportation needs     Medical: None     Non-medical: None   Tobacco Use    Smoking status: Never Smoker    Smokeless tobacco: Never Used   Substance and Sexual Activity    Alcohol use: Yes     Comment: wine once in a while    Drug use: No    Sexual activity: None   Lifestyle    Physical activity     Days per week: None     Minutes per session: None    Stress: None   Relationships    Social connections     Talks on phone: None     Gets together: None     Attends Religion service: None     Active member of club or organization: None     Attends meetings of clubs or organizations: None     Relationship status: None    Intimate partner violence     Fear of current or ex partner: None     Emotionally abused: None     Physically abused: None     Forced sexual activity: None   Other Topics Concern    None   Social History Narrative    None       Family History   Problem Relation Age of Onset    Hypertension Mother         pulmonary hypertension     High Blood Pressure Mother     Other Mother     Heart Disease Father     Kidney Disease Father     Hypertension Father         IBS & polyps    Other Sister         gastric ulcer & IBS    Cancer Paternal Aunt 46        breast          Review of Systems   Constitutional: Negative for activity change, appetite change, chills, diaphoresis, fatigue and fever. HENT: Negative for congestion, ear pain, hearing loss, nosebleeds, rhinorrhea, sinus pressure and sore throat. Eyes: Negative for pain, redness, itching and visual disturbance. Respiratory: Negative for apnea, cough, chest tightness, shortness of breath and wheezing. Cardiovascular: Negative for chest pain, palpitations and leg swelling. Gastrointestinal: Negative for abdominal pain, blood in stool, constipation, diarrhea, nausea and vomiting. Endocrine: Negative. Genitourinary: Negative for decreased urine volume, difficulty urinating, dysuria, frequency, hematuria and urgency. Musculoskeletal: Negative for arthralgias, back pain, gait problem, myalgias and neck pain. Skin: Negative for color change and rash. Allergic/Immunologic: Negative for environmental allergies and food allergies.    Neurological: Negative for dizziness, tenderness. Musculoskeletal: Normal range of motion. General: No tenderness. Lymphadenopathy:      Cervical: No cervical adenopathy. Skin:     General: Skin is warm and dry. Findings: No erythema or rash. Neurological:      General: No focal deficit present. Mental Status: She is alert and oriented to person, place, and time. Cranial Nerves: No cranial nerve deficit. Deep Tendon Reflexes: Reflexes are normal and symmetric. Reflexes normal.   Psychiatric:         Mood and Affect: Mood normal.                                 ASSESSMENT/PLAN:    Patient Active Problem List   Diagnosis    Fatigue    Acquired hypothyroidism    Vitamin D deficiency    Gastroesophageal reflux disease without esophagitis    S/P tonsillectomy    Myalgia    Anxiety    Breast mass, left    Seasonal allergies    Mixed hyperlipidemia       Garret was seen today for other and discuss labs. Diagnoses and all orders for this visit:    Discharge from breast  -     Comprehensive Metabolic Panel; Future  -     TSH without Reflex; Future  -     Follicle Stimulating Hormone; Future  -     Luteinizing Hormone; Future  -     PROLACTIN; Future      Has F/U with Dr. Rizo Kit breasts tomorrow    Return if symptoms worsen or fail to improve. I spent 15 minutes with this patient. I spent greater than 50% of the time counseling this patient.         Renard Guevara DO  4/29/2021  1:51 PM

## 2021-04-30 ENCOUNTER — HOSPITAL ENCOUNTER (OUTPATIENT)
Dept: GENERAL RADIOLOGY | Age: 31
Discharge: HOME | End: 2021-05-02
Payer: COMMERCIAL

## 2021-04-30 ENCOUNTER — OFFICE VISIT (OUTPATIENT)
Dept: BREAST CENTER | Age: 31
End: 2021-04-30
Payer: COMMERCIAL

## 2021-04-30 VITALS
SYSTOLIC BLOOD PRESSURE: 132 MMHG | WEIGHT: 180 LBS | HEIGHT: 63 IN | DIASTOLIC BLOOD PRESSURE: 76 MMHG | BODY MASS INDEX: 31.89 KG/M2 | RESPIRATION RATE: 18 BRPM | OXYGEN SATURATION: 98 % | TEMPERATURE: 97 F | HEART RATE: 77 BPM

## 2021-04-30 DIAGNOSIS — N64.52 NIPPLE DISCHARGE: ICD-10-CM

## 2021-04-30 PROCEDURE — 76642 ULTRASOUND BREAST LIMITED: CPT

## 2021-04-30 PROCEDURE — 99213 OFFICE O/P EST LOW 20 MIN: CPT | Performed by: NURSE PRACTITIONER

## 2021-05-06 ENCOUNTER — OFFICE VISIT (OUTPATIENT)
Dept: FAMILY MEDICINE CLINIC | Age: 31
End: 2021-05-06
Payer: COMMERCIAL

## 2021-05-06 ENCOUNTER — TELEPHONE (OUTPATIENT)
Dept: PRIMARY CARE CLINIC | Age: 31
End: 2021-05-06

## 2021-05-06 VITALS
HEART RATE: 74 BPM | BODY MASS INDEX: 31.89 KG/M2 | SYSTOLIC BLOOD PRESSURE: 126 MMHG | RESPIRATION RATE: 16 BRPM | WEIGHT: 180 LBS | HEIGHT: 63 IN | OXYGEN SATURATION: 100 % | TEMPERATURE: 97.5 F | DIASTOLIC BLOOD PRESSURE: 70 MMHG

## 2021-05-06 DIAGNOSIS — N76.0 ACUTE VAGINITIS: ICD-10-CM

## 2021-05-06 DIAGNOSIS — R39.15 URINARY URGENCY: Primary | ICD-10-CM

## 2021-05-06 DIAGNOSIS — N39.0 URINARY TRACT INFECTION WITHOUT HEMATURIA, SITE UNSPECIFIED: ICD-10-CM

## 2021-05-06 LAB
BILIRUBIN, POC: NORMAL
BLOOD URINE, POC: NORMAL
CLARITY, POC: CLEAR
COLOR, POC: YELLOW
GLUCOSE URINE, POC: NORMAL
KETONES, POC: NORMAL
LEUKOCYTE EST, POC: NORMAL
NITRITE, POC: NORMAL
PH, POC: 7
PROTEIN, POC: NORMAL
SPECIFIC GRAVITY, POC: 1.01
UROBILINOGEN, POC: 2

## 2021-05-06 PROCEDURE — 99213 OFFICE O/P EST LOW 20 MIN: CPT | Performed by: FAMILY MEDICINE

## 2021-05-06 PROCEDURE — 81002 URINALYSIS NONAUTO W/O SCOPE: CPT | Performed by: FAMILY MEDICINE

## 2021-05-06 RX ORDER — NITROFURANTOIN 25; 75 MG/1; MG/1
100 CAPSULE ORAL 2 TIMES DAILY
Qty: 20 CAPSULE | Refills: 0 | Status: SHIPPED | OUTPATIENT
Start: 2021-05-06 | End: 2021-05-16

## 2021-05-06 RX ORDER — FLUCONAZOLE 100 MG/1
100 TABLET ORAL DAILY
Qty: 3 TABLET | Refills: 0 | Status: SHIPPED | OUTPATIENT
Start: 2021-05-06 | End: 2021-05-09

## 2021-05-06 ASSESSMENT — ENCOUNTER SYMPTOMS
EYE ITCHING: 0
ABDOMINAL PAIN: 0
BACK PAIN: 0
DIARRHEA: 0
RHINORRHEA: 0
COUGH: 0
SHORTNESS OF BREATH: 0
COLOR CHANGE: 0
WHEEZING: 0
CONSTIPATION: 0
VOMITING: 0
NAUSEA: 0
SINUS PRESSURE: 0
BLOOD IN STOOL: 0
SORE THROAT: 0
EYE REDNESS: 0
CHEST TIGHTNESS: 0
EYE PAIN: 0
APNEA: 0

## 2021-05-06 NOTE — PROGRESS NOTES
Chief Complaint:     Chief Complaint   Patient presents with    Urinary Tract Infection    Vaginitis     had called and got medication a few weeks ago. then called gyn adn was given a cream last week, did 2 days adn then started period. bad burning         Urinary Tract Infection   This is a new problem. The current episode started in the past 7 days. The problem occurs intermittently. The problem has been unchanged. The quality of the pain is described as burning. The pain is mild. She is sexually active. There is no history of pyelonephritis. Associated symptoms include frequency and urgency. Pertinent negatives include no discharge, flank pain, hematuria, nausea or vomiting. She has tried nothing for the symptoms. The treatment provided no relief.        Patient Active Problem List   Diagnosis    Fatigue    Acquired hypothyroidism    Vitamin D deficiency    Gastroesophageal reflux disease without esophagitis    S/P tonsillectomy    Myalgia    Anxiety    Breast mass, left    Seasonal allergies    Mixed hyperlipidemia       Past Medical History:   Diagnosis Date    Asthma     excersise induced and allergy induced    Enlarged lymph node     at left neck, to have a bx 2018    Environmental allergies     IBS (irritable bowel syndrome)     Mitral valve prolapse     no regurgitation, with anxiety or caffeine developes palpitations,followed with Dr. Celi Villegas, has appt with Dr. Bandar Latham 2018    Palpitations 2014    caffeine induced    Sore throat     frequent    Thyroid disease        Past Surgical History:   Procedure Laterality Date     SECTION      2 children     COLONOSCOPY  16    DENTAL SURGERY      extractions    [place] BIOPSY OROPHARYNX Left 2018    LEFT TONGUE  BIOPSY performed by Estela Quiroz MD at [facility] 61 OF TONSILS,12+ Y/O N/A 2018    TONSILLECTOMY performed by Estela Quiroz MD at [facility] 57 N/A 2018 TONSILLECTOMY POSTOP HEMORRHAGE CONTROL performed by Lolis Douglas MD at [facility] OR       Current Outpatient Medications   Medication Sig Dispense Refill    fluconazole (DIFLUCAN) 100 MG tablet Take 1 tablet by mouth daily for 3 days 3 tablet 0    miconazole (MICOTIN) 200 MG vaginal suppository Place 1 suppository vaginally nightly for 7 days 7 suppository 0    nitrofurantoin, macrocrystal-monohydrate, (MACROBID) 100 MG capsule Take 1 capsule by mouth 2 times daily for 10 days 20 capsule 0    montelukast (SINGULAIR) 10 MG tablet Take 1 tablet by mouth daily 30 tablet 0    albuterol sulfate  (90 Base) MCG/ACT inhaler Inhale 2 puffs into the lungs 4 times daily as needed for Wheezing 1 Inhaler 0    mupirocin (BACTROBAN) 2 % ointment Apply 3 times daily. 30 g 0    levothyroxine (SYNTHROID) 75 MCG tablet take 1 tablet by mouth once daily 30 tablet 3    citalopram (CELEXA) 40 MG tablet take 1 tablet by mouth once daily 90 tablet 1    omeprazole (PRILOSEC) 20 MG delayed release capsule TAKE 2 CAPSULES BY MOUTH ONCE DAILY IN THE MORNING      Cholecalciferol 100 MCG (4000 UT) CAPS Take by mouth      Ascorbic Acid (VITAMIN C) 1000 MG tablet Take 1,000 mg by mouth daily      famotidine (PEPCID) 20 MG tablet Take 1 tablet by mouth 2 times daily 60 tablet 0    loratadine (CLARITIN) 10 MG tablet Take 10 mg by mouth daily       No current facility-administered medications for this visit.         Allergies   Allergen Reactions    Cefdinir Hives     Had reaction as a child  Other reaction(s): AOF    Lomedia 24 Fe [Norethin Ace-Eth Estrad-Fe] Hives    Nutritional Supplements      Other reaction(s): AOF    Sulfa Antibiotics Hives     Joint swell       Social History     Socioeconomic History    Marital status:      Spouse name: None    Number of children: None    Years of education: None    Highest education level: None   Occupational History     Employer: [employer] Positive for frequency and urgency. Negative for decreased urine volume, difficulty urinating, dysuria, flank pain and hematuria. Musculoskeletal: Negative for arthralgias, back pain, gait problem, myalgias and neck pain. Skin: Negative for color change and rash. Allergic/Immunologic: Negative for environmental allergies and food allergies. Neurological: Negative for dizziness, weakness, light-headedness, numbness and headaches. Hematological: Negative for adenopathy. Does not bruise/bleed easily. Psychiatric/Behavioral: Negative for behavioral problems, dysphoric mood and sleep disturbance. The patient is not nervous/anxious and is not hyperactive. All other systems reviewed and are negative. /70   Pulse 74   Temp 97.5 °F (36.4 °C)   Resp 16   Ht 5' 3\" (1.6 m)   Wt 180 lb (81.6 kg)   LMP 04/16/2021   SpO2 100%   BMI 31.89 kg/m²     Physical Exam  Vitals signs and nursing note reviewed. Constitutional:       General: She is not in acute distress. Appearance: Normal appearance. She is well-developed. HENT:      Head: Normocephalic and atraumatic. Right Ear: Hearing, tympanic membrane and external ear normal. No tenderness. No middle ear effusion. Left Ear: Hearing, tympanic membrane and external ear normal. No tenderness. No middle ear effusion. Nose: Nose normal. No congestion or rhinorrhea. Right Turbinates: Not enlarged. Left Turbinates: Not enlarged. Mouth/Throat:      Mouth: Mucous membranes are moist.      Tongue: No lesions. Pharynx: Oropharynx is clear. No oropharyngeal exudate or posterior oropharyngeal erythema. Eyes:      General: No scleral icterus. Conjunctiva/sclera: Conjunctivae normal.      Pupils: Pupils are equal, round, and reactive to light. Neck:      Musculoskeletal: Normal range of motion and neck supple. No neck rigidity or muscular tenderness. Thyroid: No thyromegaly.    Cardiovascular:      Rate and Rhythm: Normal rate and regular rhythm. Heart sounds: Normal heart sounds. No murmur. Pulmonary:      Effort: Pulmonary effort is normal. No respiratory distress. Breath sounds: Normal breath sounds. No wheezing or rales. Abdominal:      General: Bowel sounds are normal. There is no distension. Palpations: Abdomen is soft. Tenderness: There is no abdominal tenderness. Musculoskeletal: Normal range of motion. General: No tenderness. Lymphadenopathy:      Cervical: No cervical adenopathy. Skin:     General: Skin is warm and dry. Findings: No erythema or rash. Neurological:      General: No focal deficit present. Mental Status: She is alert and oriented to person, place, and time. Cranial Nerves: No cranial nerve deficit. Deep Tendon Reflexes: Reflexes are normal and symmetric. Reflexes normal.   Psychiatric:         Mood and Affect: Mood normal.                                 ASSESSMENT/PLAN:    Patient Active Problem List   Diagnosis    Fatigue    Acquired hypothyroidism    Vitamin D deficiency    Gastroesophageal reflux disease without esophagitis    S/P tonsillectomy    Myalgia    Anxiety    Breast mass, left    Seasonal allergies    Mixed hyperlipidemia       Garret was seen today for urinary tract infection and vaginitis. Diagnoses and all orders for this visit:    Urinary urgency  -     POCT Urinalysis no Micro    Urinary tract infection without hematuria, site unspecified  -     Culture, Urine; Future    Acute vaginitis    Other orders  -     fluconazole (DIFLUCAN) 100 MG tablet; Take 1 tablet by mouth daily for 3 days  -     miconazole (MICOTIN) 200 MG vaginal suppository; Place 1 suppository vaginally nightly for 7 days  -     nitrofurantoin, macrocrystal-monohydrate, (MACROBID) 100 MG capsule; Take 1 capsule by mouth 2 times daily for 10 days          Return if symptoms worsen or fail to improve.       I spent 15 minutes with this patient. I spent greater than 50% of the time counseling this patient.         Nurys Alvarado DO  5/6/2021  8:35 AM

## 2021-05-06 NOTE — TELEPHONE ENCOUNTER
Received call from [facility], Miconazole 200 mg is a 3 day treatment. If you want her use it for 7 days, it will need changed to 100 mg.

## 2021-05-08 LAB — URINE CULTURE, ROUTINE: NORMAL

## 2021-05-18 ENCOUNTER — TELEPHONE (OUTPATIENT)
Dept: PRIMARY CARE CLINIC | Age: 31
End: 2021-05-18

## 2021-05-26 RX ORDER — VALACYCLOVIR HYDROCHLORIDE 1 G/1
1000 TABLET, FILM COATED ORAL 2 TIMES DAILY
Qty: 20 TABLET | Refills: 1 | Status: SHIPPED
Start: 2021-05-26 | End: 2022-09-13 | Stop reason: SDUPTHER

## 2021-06-02 ENCOUNTER — TELEPHONE (OUTPATIENT)
Dept: PRIMARY CARE CLINIC | Age: 31
End: 2021-06-02

## 2021-06-02 NOTE — TELEPHONE ENCOUNTER
Patient calling states she has been on 3 antibiotics since january. On Sunday she started with loose stools (not liquid) no more than 3 per day. Also has stomach cramping. Wanting you to be aware not sure if she should be concerned with c-diff.

## 2021-06-02 NOTE — TELEPHONE ENCOUNTER
Advise her to take a probiotic. Can eat a regular diet as tolerated.  If stool is not watery, I would not be concerned about CDiff

## 2021-06-08 ENCOUNTER — TELEPHONE (OUTPATIENT)
Dept: PRIMARY CARE CLINIC | Age: 31
End: 2021-06-08

## 2021-06-08 RX ORDER — SEMAGLUTIDE 1.34 MG/ML
1 INJECTION, SOLUTION SUBCUTANEOUS WEEKLY
Qty: 2 PEN | Refills: 2 | Status: SHIPPED
Start: 2021-06-08 | End: 2021-12-08

## 2021-06-08 NOTE — TELEPHONE ENCOUNTER
Pt calling asking fi she could get on saxenda or ozempic because she struggles to lose weight with her PCOS and thyroid issues. Said she had tried adipex in the past and has side effects from it.

## 2021-06-09 ENCOUNTER — TELEPHONE (OUTPATIENT)
Dept: PRIMARY CARE CLINIC | Age: 31
End: 2021-06-09

## 2021-06-09 NOTE — TELEPHONE ENCOUNTER
The pt is calling because the pharmacy is telling her that the script for Ozempic needs prior authorized

## 2021-06-18 ENCOUNTER — TELEPHONE (OUTPATIENT)
Dept: PRIMARY CARE CLINIC | Age: 31
End: 2021-06-18

## 2021-06-18 NOTE — TELEPHONE ENCOUNTER
Pt calling and states that her insurance will not cover the previous meds called in. She ound a coupon online for [employer] and is asking if this can be called into pharmacy instead. Please advise.

## 2021-06-21 RX ORDER — LIRAGLUTIDE 6 MG/ML
INJECTION, SOLUTION SUBCUTANEOUS
Qty: 2 PEN | Refills: 1 | Status: SHIPPED
Start: 2021-06-21 | End: 2021-12-08

## 2021-07-06 RX ORDER — ALBUTEROL SULFATE 90 UG/1
2 AEROSOL, METERED RESPIRATORY (INHALATION) 4 TIMES DAILY PRN
Qty: 1 INHALER | Refills: 0 | Status: SHIPPED | OUTPATIENT
Start: 2021-07-06

## 2021-07-14 RX ORDER — LEVOTHYROXINE SODIUM 0.07 MG/1
TABLET ORAL
Qty: 30 TABLET | Refills: 3 | Status: SHIPPED
Start: 2021-07-14 | End: 2021-11-17

## 2021-07-15 ENCOUNTER — OFFICE VISIT (OUTPATIENT)
Dept: PRIMARY CARE CLINIC | Age: 31
End: 2021-07-15
Payer: COMMERCIAL

## 2021-07-15 VITALS
SYSTOLIC BLOOD PRESSURE: 124 MMHG | HEIGHT: 63 IN | BODY MASS INDEX: 32.43 KG/M2 | WEIGHT: 183 LBS | OXYGEN SATURATION: 98 % | HEART RATE: 83 BPM | DIASTOLIC BLOOD PRESSURE: 70 MMHG | RESPIRATION RATE: 16 BRPM

## 2021-07-15 DIAGNOSIS — R53.83 FATIGUE, UNSPECIFIED TYPE: ICD-10-CM

## 2021-07-15 DIAGNOSIS — E55.9 VITAMIN D DEFICIENCY: ICD-10-CM

## 2021-07-15 DIAGNOSIS — E03.9 ACQUIRED HYPOTHYROIDISM: Primary | ICD-10-CM

## 2021-07-15 DIAGNOSIS — F41.9 ANXIETY: ICD-10-CM

## 2021-07-15 DIAGNOSIS — E03.9 ACQUIRED HYPOTHYROIDISM: ICD-10-CM

## 2021-07-15 DIAGNOSIS — E66.09 CLASS 1 OBESITY DUE TO EXCESS CALORIES WITHOUT SERIOUS COMORBIDITY WITH BODY MASS INDEX (BMI) OF 32.0 TO 32.9 IN ADULT: ICD-10-CM

## 2021-07-15 PROBLEM — E66.811 CLASS 1 OBESITY DUE TO EXCESS CALORIES WITHOUT SERIOUS COMORBIDITY WITH BODY MASS INDEX (BMI) OF 32.0 TO 32.9 IN ADULT: Status: ACTIVE | Noted: 2021-07-15

## 2021-07-15 LAB
ALBUMIN SERPL-MCNC: 4.3 G/DL (ref 3.5–5.2)
ALP BLD-CCNC: 72 U/L (ref 35–104)
ALT SERPL-CCNC: 16 U/L (ref 0–32)
ANION GAP SERPL CALCULATED.3IONS-SCNC: 11 MMOL/L (ref 7–16)
AST SERPL-CCNC: 21 U/L (ref 0–31)
BASOPHILS ABSOLUTE: 0.05 E9/L (ref 0–0.2)
BASOPHILS RELATIVE PERCENT: 0.7 % (ref 0–2)
BILIRUB SERPL-MCNC: 0.3 MG/DL (ref 0–1.2)
BUN BLDV-MCNC: 10 MG/DL (ref 6–20)
CALCIUM SERPL-MCNC: 9.1 MG/DL (ref 8.6–10.2)
CHLORIDE BLD-SCNC: 103 MMOL/L (ref 98–107)
CHOLESTEROL, TOTAL: 207 MG/DL (ref 0–199)
CO2: 23 MMOL/L (ref 22–29)
CORTISOL TOTAL: 13.15 MCG/DL (ref 2.68–18.4)
CREAT SERPL-MCNC: 0.6 MG/DL (ref 0.5–1)
EOSINOPHILS ABSOLUTE: 0.24 E9/L (ref 0.05–0.5)
EOSINOPHILS RELATIVE PERCENT: 3.4 % (ref 0–6)
FOLATE: >20 NG/ML (ref 4.8–24.2)
GFR AFRICAN AMERICAN: >60
GFR NON-AFRICAN AMERICAN: >60 ML/MIN/1.73
GLUCOSE BLD-MCNC: 87 MG/DL (ref 74–99)
HCT VFR BLD CALC: 39.8 % (ref 34–48)
HDLC SERPL-MCNC: 42 MG/DL
HEMOGLOBIN: 12.8 G/DL (ref 11.5–15.5)
IMMATURE GRANULOCYTES #: 0.02 E9/L
IMMATURE GRANULOCYTES %: 0.3 % (ref 0–5)
LDL CHOLESTEROL CALCULATED: 143 MG/DL (ref 0–99)
LYMPHOCYTES ABSOLUTE: 1.55 E9/L (ref 1.5–4)
LYMPHOCYTES RELATIVE PERCENT: 21.9 % (ref 20–42)
MCH RBC QN AUTO: 29.1 PG (ref 26–35)
MCHC RBC AUTO-ENTMCNC: 32.2 % (ref 32–34.5)
MCV RBC AUTO: 90.5 FL (ref 80–99.9)
MONOCYTES ABSOLUTE: 0.78 E9/L (ref 0.1–0.95)
MONOCYTES RELATIVE PERCENT: 11 % (ref 2–12)
NEUTROPHILS ABSOLUTE: 4.44 E9/L (ref 1.8–7.3)
NEUTROPHILS RELATIVE PERCENT: 62.7 % (ref 43–80)
PDW BLD-RTO: 11.9 FL (ref 11.5–15)
PLATELET # BLD: 234 E9/L (ref 130–450)
PMV BLD AUTO: 11 FL (ref 7–12)
POTASSIUM SERPL-SCNC: 4.3 MMOL/L (ref 3.5–5)
RBC # BLD: 4.4 E12/L (ref 3.5–5.5)
SODIUM BLD-SCNC: 137 MMOL/L (ref 132–146)
T4 FREE: 0.85 NG/DL (ref 0.93–1.7)
TOTAL PROTEIN: 7.5 G/DL (ref 6.4–8.3)
TRIGL SERPL-MCNC: 110 MG/DL (ref 0–149)
TSH SERPL DL<=0.05 MIU/L-ACNC: 1.52 UIU/ML (ref 0.27–4.2)
VITAMIN B-12: 767 PG/ML (ref 211–946)
VITAMIN D 25-HYDROXY: 34 NG/ML (ref 30–100)
VLDLC SERPL CALC-MCNC: 22 MG/DL
WBC # BLD: 7.1 E9/L (ref 4.5–11.5)

## 2021-07-15 PROCEDURE — 99214 OFFICE O/P EST MOD 30 MIN: CPT | Performed by: FAMILY MEDICINE

## 2021-07-15 RX ORDER — NALTREXONE HYDROCHLORIDE 50 MG/1
25 TABLET, FILM COATED ORAL 2 TIMES DAILY
Qty: 60 TABLET | Refills: 3 | Status: SHIPPED
Start: 2021-07-15 | End: 2021-12-08

## 2021-07-15 RX ORDER — BUPROPION HYDROCHLORIDE 100 MG/1
100 TABLET, EXTENDED RELEASE ORAL 2 TIMES DAILY
Qty: 60 TABLET | Refills: 3 | Status: SHIPPED
Start: 2021-07-15 | End: 2021-09-08

## 2021-07-15 RX ORDER — NALTREXONE HYDROCHLORIDE AND BUPROPION HYDROCHLORIDE 8; 90 MG/1; MG/1
2 TABLET, EXTENDED RELEASE ORAL 2 TIMES DAILY
Qty: 120 TABLET | Refills: 2 | Status: SHIPPED
Start: 2021-07-15 | End: 2021-09-08

## 2021-07-15 ASSESSMENT — ENCOUNTER SYMPTOMS
RHINORRHEA: 0
SHORTNESS OF BREATH: 0
COUGH: 0
EYE PAIN: 0
NAUSEA: 0
SINUS PRESSURE: 0
APNEA: 0
COLOR CHANGE: 0
CONSTIPATION: 0
CHEST TIGHTNESS: 0
WHEEZING: 0
SORE THROAT: 0
DIARRHEA: 0
VOMITING: 0
BACK PAIN: 0
BLOOD IN STOOL: 0
EYE REDNESS: 0
EYE ITCHING: 0
ABDOMINAL PAIN: 0

## 2021-07-15 NOTE — PROGRESS NOTES
Chief Complaint:     Chief Complaint   Patient presents with    Hypothyroidism    Weight Gain     discuss, has tried adipex before and couldnt take it. Fatigue  This is a recurrent problem. The current episode started more than 1 month ago. The problem occurs intermittently. The problem has been waxing and waning. Associated symptoms include fatigue. Pertinent negatives include no abdominal pain, arthralgias, chest pain, congestion, coughing, fever, headaches, myalgias, nausea, neck pain, numbness, rash, sore throat, vomiting or weakness. Nothing aggravates the symptoms. She has tried nothing for the symptoms. The treatment provided no relief. Other  Associated symptoms include fatigue. Pertinent negatives include no abdominal pain, arthralgias, chest pain, congestion, coughing, fever, headaches, myalgias, nausea, neck pain, numbness, rash, sore throat, vomiting or weakness. Hyperlipidemia  This is a recurrent problem. The current episode started more than 1 month ago. Exacerbating diseases include hypothyroidism. She has no history of diabetes or obesity. There are no known factors aggravating her hyperlipidemia. Pertinent negatives include no chest pain, myalgias or shortness of breath. She is currently on no antihyperlipidemic treatment. The current treatment provides significant improvement of lipids. There are no compliance problems. Risk factors for coronary artery disease include stress.        Patient Active Problem List   Diagnosis    Fatigue    Acquired hypothyroidism    Vitamin D deficiency    Gastroesophageal reflux disease without esophagitis    S/P tonsillectomy    Myalgia    Anxiety    Breast mass, left    Seasonal allergies    Mixed hyperlipidemia    Class 1 obesity due to excess calories without serious comorbidity with body mass index (BMI) of 32.0 to 32.9 in adult       Past Medical History:   Diagnosis Date    Asthma     excersise induced and allergy induced    Enlarged lymph node     at left neck, to have a bx 2018    Environmental allergies     IBS (irritable bowel syndrome)     Mitral valve prolapse     no regurgitation, with anxiety or caffeine developes palpitations,followed with Dr. Jerad Egan, has appt with Dr. Ruffin Antis 2018    Palpitations 2014    caffeine induced    Sore throat     frequent    Thyroid disease        Past Surgical History:   Procedure Laterality Date     SECTION      2 children     COLONOSCOPY  18    DENTAL SURGERY      extractions    [place] BIOPSY OROPHARYNX Left 2018    LEFT TONGUE  BIOPSY performed by Sreekanth Cross MD at [facility] 61 OF TONSILS,12+ Y/O N/A 2018    TONSILLECTOMY performed by Sreekanth Cross MD at [facility] 57 N/A 2018    TONSILLECTOMY POSTOP HEMORRHAGE CONTROL performed by Sreekanth Cross MD at [facility] OR       Current Outpatient Medications   Medication Sig Dispense Refill    naltrexone-buPROPion (CONTRAVE) 8-90 MG per extended release tablet Take 2 tablets by mouth 2 times daily 120 tablet 2    levothyroxine (SYNTHROID) 75 MCG tablet take 1 tablet by mouth once daily 30 tablet 3    albuterol sulfate  (90 Base) MCG/ACT inhaler Inhale 2 puffs into the lungs 4 times daily as needed for Wheezing 1 Inhaler 0    SAXENDA 18 MG/3ML SOPN Start at 0.6 mg daily and titrate dose every 7 days until dose gets up to 3 mg daily 2 pen 1    Semaglutide, 1 MG/DOSE, (OZEMPIC, 1 MG/DOSE,) 2 MG/1.5ML SOPN Inject 1 mg into the skin once a week 2 pen 2    valACYclovir (VALTREX) 1 g tablet Take 1 tablet by mouth 2 times daily 20 tablet 1    montelukast (SINGULAIR) 10 MG tablet Take 1 tablet by mouth daily 30 tablet 0    mupirocin (BACTROBAN) 2 % ointment Apply 3 times daily.  30 g 0    citalopram (CELEXA) 40 MG tablet take 1 tablet by mouth once daily 90 tablet 1    omeprazole (PRILOSEC) 20 MG delayed release capsule TAKE 2 CAPSULES BY MOUTH ONCE Organization Meetings:     Marital Status:    Intimate Partner Violence:     Fear of Current or Ex-Partner:     Emotionally Abused:     Physically Abused:     Sexually Abused:        Family History   Problem Relation Age of Onset    Hypertension Mother         pulmonary hypertension     High Blood Pressure Mother     Other Mother     Heart Disease Father     Kidney Disease Father     Hypertension Father         IBS & polyps    Other Sister         gastric ulcer & IBS    Cancer Paternal Aunt 46        breast          Review of Systems   Constitutional: Positive for fatigue. Negative for activity change, appetite change and fever. HENT: Negative for congestion, ear pain, hearing loss, nosebleeds, rhinorrhea, sinus pressure and sore throat. Eyes: Negative for pain, redness, itching and visual disturbance. Respiratory: Negative for apnea, cough, chest tightness, shortness of breath and wheezing. Cardiovascular: Negative for chest pain, palpitations and leg swelling. Gastrointestinal: Negative for abdominal pain, blood in stool, constipation, diarrhea, nausea and vomiting. Endocrine: Negative. Genitourinary: Negative for decreased urine volume, difficulty urinating, dysuria, frequency, hematuria and urgency. Musculoskeletal: Negative for arthralgias, back pain, gait problem, myalgias and neck pain. Skin: Negative for color change and rash. Allergic/Immunologic: Negative for environmental allergies and food allergies. Neurological: Negative for dizziness, weakness, light-headedness, numbness and headaches. Hematological: Negative for adenopathy. Does not bruise/bleed easily. Psychiatric/Behavioral: Negative for behavioral problems, dysphoric mood and sleep disturbance. The patient is not nervous/anxious and is not hyperactive. All other systems reviewed and are negative.         /70   Pulse 83   Resp 16   Ht 5' 3\" (1.6 m)   Wt 183 lb (83 kg)   SpO2 98%   BMI 32.42 kg/m²     Physical Exam  Vitals and nursing note reviewed. Constitutional:       General: She is not in acute distress. Appearance: Normal appearance. She is well-developed. HENT:      Head: Normocephalic and atraumatic. Right Ear: Hearing, tympanic membrane and external ear normal. No tenderness. No middle ear effusion. Left Ear: Hearing, tympanic membrane and external ear normal. No tenderness. No middle ear effusion. Nose: Nose normal. No congestion or rhinorrhea. Right Turbinates: Not enlarged. Left Turbinates: Not enlarged. Mouth/Throat:      Mouth: Mucous membranes are moist.      Tongue: No lesions. Pharynx: Oropharynx is clear. No oropharyngeal exudate or posterior oropharyngeal erythema. Eyes:      General: No scleral icterus. Conjunctiva/sclera: Conjunctivae normal.      Pupils: Pupils are equal, round, and reactive to light. Neck:      Thyroid: No thyromegaly. Cardiovascular:      Rate and Rhythm: Normal rate and regular rhythm. Heart sounds: Normal heart sounds. No murmur heard. Pulmonary:      Effort: Pulmonary effort is normal. No respiratory distress. Breath sounds: Normal breath sounds. No wheezing or rales. Abdominal:      General: Bowel sounds are normal. There is no distension. Palpations: Abdomen is soft. Tenderness: There is no abdominal tenderness. Musculoskeletal:         General: No tenderness. Normal range of motion. Cervical back: Normal range of motion and neck supple. No rigidity. No muscular tenderness. Lymphadenopathy:      Cervical: No cervical adenopathy. Skin:     General: Skin is warm and dry. Findings: No erythema or rash. Neurological:      General: No focal deficit present. Mental Status: She is alert and oriented to person, place, and time. Cranial Nerves: No cranial nerve deficit. Deep Tendon Reflexes: Reflexes are normal and symmetric.  Reflexes normal.

## 2021-08-02 RX ORDER — CITALOPRAM 40 MG/1
TABLET ORAL
Qty: 90 TABLET | Refills: 1 | Status: SHIPPED
Start: 2021-08-02 | End: 2021-12-06 | Stop reason: SDUPTHER

## 2021-09-07 ENCOUNTER — E-VISIT (OUTPATIENT)
Dept: PRIMARY CARE CLINIC | Age: 31
End: 2021-09-07
Payer: COMMERCIAL

## 2021-09-07 DIAGNOSIS — E03.9 ACQUIRED HYPOTHYROIDISM: Primary | ICD-10-CM

## 2021-09-07 DIAGNOSIS — E66.09 CLASS 1 OBESITY DUE TO EXCESS CALORIES WITHOUT SERIOUS COMORBIDITY WITH BODY MASS INDEX (BMI) OF 32.0 TO 32.9 IN ADULT: ICD-10-CM

## 2021-09-07 PROCEDURE — 99423 OL DIG E/M SVC 21+ MIN: CPT | Performed by: FAMILY MEDICINE

## 2021-09-08 ENCOUNTER — TELEPHONE (OUTPATIENT)
Dept: PRIMARY CARE CLINIC | Age: 31
End: 2021-09-08

## 2021-09-08 DIAGNOSIS — E66.09 CLASS 1 OBESITY DUE TO EXCESS CALORIES WITHOUT SERIOUS COMORBIDITY WITH BODY MASS INDEX (BMI) OF 32.0 TO 32.9 IN ADULT: Primary | ICD-10-CM

## 2021-09-08 DIAGNOSIS — E03.9 ACQUIRED HYPOTHYROIDISM: ICD-10-CM

## 2021-09-08 NOTE — PROGRESS NOTES
Chief Complaint:     No chief complaint on file. Fatigue  This is a recurrent problem. The current episode started more than 1 month ago. The problem occurs intermittently. The problem has been waxing and waning. Associated symptoms include fatigue. Pertinent negatives include no abdominal pain, arthralgias, chest pain, congestion, coughing, fever, headaches, myalgias, nausea, neck pain, numbness, rash, sore throat, vomiting or weakness. Nothing aggravates the symptoms. She has tried nothing for the symptoms. The treatment provided no relief. Other  Associated symptoms include fatigue. Pertinent negatives include no abdominal pain, arthralgias, chest pain, congestion, coughing, fever, headaches, myalgias, nausea, neck pain, numbness, rash, sore throat, vomiting or weakness. Hyperlipidemia  This is a recurrent problem. The current episode started more than 1 month ago. Exacerbating diseases include hypothyroidism. She has no history of diabetes or obesity. There are no known factors aggravating her hyperlipidemia. Pertinent negatives include no chest pain, myalgias or shortness of breath. She is currently on no antihyperlipidemic treatment. The current treatment provides significant improvement of lipids. There are no compliance problems. Risk factors for coronary artery disease include stress.        Patient Active Problem List   Diagnosis    Fatigue    Acquired hypothyroidism    Vitamin D deficiency    Gastroesophageal reflux disease without esophagitis    S/P tonsillectomy    Myalgia    Anxiety    Breast mass, left    Seasonal allergies    Mixed hyperlipidemia    Class 1 obesity due to excess calories without serious comorbidity with body mass index (BMI) of 32.0 to 32.9 in adult       Past Medical History:   Diagnosis Date    Asthma     excersise induced and allergy induced    Enlarged lymph node     at left neck, to have a bx 11/23/2018    Environmental allergies     IBS (irritable bowel syndrome)     Mitral valve prolapse     no regurgitation, with anxiety or caffeine developes palpitations,followed with Dr. Davida Tipton, has appt with Dr. Rina Whitehead 2018    Palpitations 2014    caffeine induced    Sore throat     frequent    Thyroid disease        Past Surgical History:   Procedure Laterality Date     SECTION      2 children     COLONOSCOPY  16    DENTAL SURGERY      extractions    [place] BIOPSY OROPHARYNX Left 2018    LEFT TONGUE  BIOPSY performed by Maximo Orr MD at [facility] 59 OF TONSILS,12+ Y/O N/A 2018    TONSILLECTOMY performed by Maximo Orr MD at [facility] N/A 2018    TONSILLECTOMY POSTOP HEMORRHAGE CONTROL performed by Maximo Orr MD at [facility] OR       Current Outpatient Medications   Medication Sig Dispense Refill    citalopram (CELEXA) 40 MG tablet take 1 tablet by mouth once daily 90 tablet 1    naltrexone-buPROPion (CONTRAVE) 8-90 MG per extended release tablet Take 2 tablets by mouth 2 times daily 120 tablet 2    naltrexone (DEPADE) 50 MG tablet Take 0.5 tablets by mouth 2 times daily 60 tablet 3    buPROPion (WELLBUTRIN SR) 100 MG extended release tablet Take 1 tablet by mouth 2 times daily 60 tablet 3    levothyroxine (SYNTHROID) 75 MCG tablet take 1 tablet by mouth once daily 30 tablet 3    albuterol sulfate  (90 Base) MCG/ACT inhaler Inhale 2 puffs into the lungs 4 times daily as needed for Wheezing 1 Inhaler 0    SAXENDA 18 MG/3ML SOPN Start at 0.6 mg daily and titrate dose every 7 days until dose gets up to 3 mg daily 2 pen 1    Semaglutide, 1 MG/DOSE, (OZEMPIC, 1 MG/DOSE,) 2 MG/1.5ML SOPN Inject 1 mg into the skin once a week 2 pen 2    valACYclovir (VALTREX) 1 g tablet Take 1 tablet by mouth 2 times daily 20 tablet 1    montelukast (SINGULAIR) 10 MG tablet Take 1 tablet by mouth daily 30 tablet 0    mupirocin (BACTROBAN) 2 % ointment Apply 3 times daily.  30 g 0  omeprazole (PRILOSEC) 20 MG delayed release capsule TAKE 2 CAPSULES BY MOUTH ONCE DAILY IN THE MORNING      Cholecalciferol 100 MCG (4000 UT) CAPS Take by mouth      Ascorbic Acid (VITAMIN C) 1000 MG tablet Take 1,000 mg by mouth daily      famotidine (PEPCID) 20 MG tablet Take 1 tablet by mouth 2 times daily 60 tablet 0    loratadine (CLARITIN) 10 MG tablet Take 10 mg by mouth daily       No current facility-administered medications for this visit. Allergies   Allergen Reactions    Cefdinir Hives     Had reaction as a child  Other reaction(s): AOF    Lomedia 24 Fe [Norethin Ace-Eth Estrad-Fe] Hives    Nutritional Supplements      Other reaction(s): AOF    Sulfa Antibiotics Hives     Joint swell       Social History     Socioeconomic History    Marital status:      Spouse name: Not on file    Number of children: Not on file    Years of education: Not on file    Highest education level: Not on file   Occupational History     Employer: [employer]     Employer: [employer]   Tobacco Use    Smoking status: Never Smoker    Smokeless tobacco: Never Used   Vaping Use    Vaping Use: Never used   Substance and Sexual Activity    Alcohol use: Yes     Comment: wine once in a while    Drug use: No    Sexual activity: Not on file   Other Topics Concern    Not on file   Social History Narrative    Not on file     Social Determinants of Health     Financial Resource Strain:     Difficulty of Paying Living Expenses:    Food Insecurity:     Worried About Running Out of Food in the Last Year:     Ran Out of Food in the Last Year:    Transportation Needs:     Lack of Transportation (Medical):      Lack of Transportation (Non-Medical):    Physical Activity:     Days of Exercise per Week:     Minutes of Exercise per Session:    Stress:     Feeling of Stress :    Social Connections:     Frequency of Communication with Friends and Family:     Frequency of Social Gatherings with Friends and Family:     Attends Evangelical Services:     Active Member of Clubs or Organizations:     Attends Club or Organization Meetings:     Marital Status:    Intimate Partner Violence:     Fear of Current or Ex-Partner:     Emotionally Abused:     Physically Abused:     Sexually Abused:        Family History   Problem Relation Age of Onset    Hypertension Mother         pulmonary hypertension     High Blood Pressure Mother     Other Mother     Heart Disease Father     Kidney Disease Father     Hypertension Father         IBS & polyps    Other Sister         gastric ulcer & IBS    Cancer Paternal Aunt 46        breast          Review of Systems   Constitutional: Positive for fatigue. Negative for activity change, appetite change and fever. HENT: Negative for congestion, ear pain, hearing loss, nosebleeds, rhinorrhea, sinus pressure and sore throat. Eyes: Negative for pain, redness, itching and visual disturbance. Respiratory: Negative for apnea, cough, chest tightness, shortness of breath and wheezing. Cardiovascular: Negative for chest pain, palpitations and leg swelling. Gastrointestinal: Negative for abdominal pain, blood in stool, constipation, diarrhea, nausea and vomiting. Endocrine: Negative. Genitourinary: Negative for decreased urine volume, difficulty urinating, dysuria, frequency, hematuria and urgency. Musculoskeletal: Negative for arthralgias, back pain, gait problem, myalgias and neck pain. Skin: Negative for color change and rash. Allergic/Immunologic: Negative for environmental allergies and food allergies. Neurological: Negative for dizziness, weakness, light-headedness, numbness and headaches. Hematological: Negative for adenopathy. Does not bruise/bleed easily. Psychiatric/Behavioral: Negative for behavioral problems, dysphoric mood and sleep disturbance. The patient is not nervous/anxious and is not hyperactive.     All other systems reviewed and are negative. ASSESSMENT/PLAN:    Patient Active Problem List   Diagnosis    Fatigue    Acquired hypothyroidism    Vitamin D deficiency    Gastroesophageal reflux disease without esophagitis    S/P tonsillectomy    Myalgia    Anxiety    Breast mass, left    Seasonal allergies    Mixed hyperlipidemia    Class 1 obesity due to excess calories without serious comorbidity with body mass index (BMI) of 32.0 to 32.9 in adult       Diagnoses and all orders for this visit:    Acquired hypothyroidism  -     TSH without Reflex; Future  -     T4, Free; Future  -     Katie Baker MD, B[facility], [facility]    Class 1 obesity due to excess calories without serious comorbidity with body mass index (BMI) of 32.0 to 32.9 in adult  -     TSH without Reflex; Future  -     T4, Free; Future  -     Katie Baker MD, B[facility]rics, [facility]          No follow-ups on file. I spent 30 minutes with this patient. I spent greater than 50% of the time counseling this patient.         Jess Zamarripa DO  9/8/2021  9:35 AM

## 2021-09-08 NOTE — TELEPHONE ENCOUNTER
Dr Joey Lopez office is calling because you placed a referral for the pt for Dr. Karlie Angel for bariatrics.  They spoke to the pt and the pt says she wants to see Dr. Gabe Bennett for endocrinology not bariatrics, they will need a new referral saying endocrinology not bariatrics

## 2021-09-22 ENCOUNTER — PATIENT MESSAGE (OUTPATIENT)
Dept: PRIMARY CARE CLINIC | Age: 31
End: 2021-09-22

## 2021-09-22 ENCOUNTER — OFFICE VISIT (OUTPATIENT)
Dept: ENDOCRINOLOGY | Age: 31
End: 2021-09-22
Payer: COMMERCIAL

## 2021-09-22 VITALS
RESPIRATION RATE: 18 BRPM | SYSTOLIC BLOOD PRESSURE: 113 MMHG | BODY MASS INDEX: 33.13 KG/M2 | OXYGEN SATURATION: 97 % | HEIGHT: 63 IN | WEIGHT: 187 LBS | HEART RATE: 73 BPM | DIASTOLIC BLOOD PRESSURE: 77 MMHG

## 2021-09-22 DIAGNOSIS — E04.1 THYROID NODULE: Primary | ICD-10-CM

## 2021-09-22 DIAGNOSIS — E03.9 HYPOTHYROIDISM, UNSPECIFIED TYPE: ICD-10-CM

## 2021-09-22 DIAGNOSIS — L68.0 HIRSUTISM: ICD-10-CM

## 2021-09-22 DIAGNOSIS — E28.2 PCOS (POLYCYSTIC OVARIAN SYNDROME): Primary | ICD-10-CM

## 2021-09-22 PROCEDURE — 99204 OFFICE O/P NEW MOD 45 MIN: CPT | Performed by: INTERNAL MEDICINE

## 2021-09-22 RX ORDER — DEXAMETHASONE 1 MG
1 TABLET ORAL ONCE
Qty: 1 TABLET | Refills: 0 | Status: SHIPPED | OUTPATIENT
Start: 2021-09-22 | End: 2021-09-22

## 2021-09-22 NOTE — PROGRESS NOTES
[address] [place] [address] Department of Endocrinology Diabetes and Metabolism   [address] [facility] [address]   Phone: [phone]  Fax: [phone]    Date of Service: 9/22/2021  Primary Care Physician: Emma Menchaca DO  Referring physician: Jaydon Reynaga DO  Provider: Isabella Doyle MD        Reason for the visit:  Primary Hypothyroidism    History of Present Illness: The history is provided by the patient. No  was used. Accuracy of the patient data is excellent. Rhonda Lomeli is a very pleasant 36 y.o. female seen today for management of hypothyroidism     The patient was diagnosed with hypothyroidism 2015 and since then has been on thyroid hormones replacement. The patient is currently on Levothyroxine 75 mcg daily. Patient takes levothyroxine in the morning at empty stomach, wait one hour before eating , avoid multivitamins containing calcium  or iron with it     Lab Results   Component Value Date/Time    TSH 1.520 07/15/2021 09:39 AM    T4FREE 0.85 (L) 07/15/2021 09:39 AM    J3GWQBE 6.0 12/25/2014 03:04 PM     Patient denied any history of  swelling in the area of the thyroid gland, weight loss, change in appetite, nervousness, anxiety, irritability, tremor, sweating, heat intolerance, changes in bowel habits, muscle weakness or difficulty sleeping. Patient also denied any h/o unexplained weight gain, new fatigue, increased sensitivity to cold, dry skin, brittle fingernails, brittle hair, facial swelling/puffiness, or depressed mood.     Mother with hyperthyroidism     PAST MEDICAL HISTORY   Past Medical History:   Diagnosis Date    Asthma     excersise induced and allergy induced    Enlarged lymph node     at left neck, to have a bx 11/23/2018    Environmental allergies     IBS (irritable bowel syndrome)     Mitral valve prolapse     no regurgitation, with anxiety or caffeine developes palpitations,followed with Dr. Jelani Herron, has appt with Dr. Antonella Cool 2018    Palpitations 2014    caffeine induced    Sore throat     frequent    Thyroid disease        PAST SURGICAL HISTORY   Past Surgical History:   Procedure Laterality Date     SECTION      2 children     COLONOSCOPY  16    DENTAL SURGERY      extractions    [place] BIOPSY OROPHARYNX Left 2018    LEFT TONGUE  BIOPSY performed by Geovanny Erwin MD at [place] 61 OF TONSILS,12+ Y/O N/A 2018    TONSILLECTOMY performed by Geovanny Erwin MD at [address] [facility] N/A 2018    TONSILLECTOMY POSTOP HEMORRHAGE CONTROL performed by Geovanny Erwin MD at [address]   Tobacco:   reports that she has never smoked. She has never used smokeless tobacco.  Alcohol:   reports current alcohol use. Drugs:   reports no history of drug use.     FAMILY HISTORY   Family History   Problem Relation Age of Onset    Hypertension Mother         pulmonary hypertension     High Blood Pressure Mother     Other Mother     Heart Disease Father     Kidney Disease Father     Hypertension Father         IBS & polyps    Other Sister         gastric ulcer & IBS    Cancer Paternal Aunt 46        breast       ALLERGIES AND DRUG REACTIONS   Allergies   Allergen Reactions    Cefdinir Hives     Had reaction as a child  Other reaction(s): AOF    Lomedia 24 Fe [Norethin Ace-Eth Estrad-Fe] Hives    Nutritional Supplements      Other reaction(s): AOF    Sulfa Antibiotics Hives     Joint swell       CURRENT MEDICATIONS   Current Outpatient Medications   Medication Sig Dispense Refill    buPROPion (WELLBUTRIN SR) 200 MG extended release tablet Take 1 tablet by mouth 2 times daily 180 tablet 1    citalopram (CELEXA) 40 MG tablet take 1 tablet by mouth once daily 90 tablet 1    naltrexone (DEPADE) 50 MG tablet Take 0.5 tablets by mouth 2 times daily 60 tablet 3    levothyroxine (SYNTHROID) 75 MCG tablet take 1 tablet by mouth once daily 30 tablet 3    albuterol sulfate  (90 Base) MCG/ACT inhaler Inhale 2 puffs into the lungs 4 times daily as needed for Wheezing 1 Inhaler 0    SAXENDA 18 MG/3ML SOPN Start at 0.6 mg daily and titrate dose every 7 days until dose gets up to 3 mg daily 2 pen 1    Semaglutide, 1 MG/DOSE, (OZEMPIC, 1 MG/DOSE,) 2 MG/1.5ML SOPN Inject 1 mg into the skin once a week 2 pen 2    valACYclovir (VALTREX) 1 g tablet Take 1 tablet by mouth 2 times daily 20 tablet 1    montelukast (SINGULAIR) 10 MG tablet Take 1 tablet by mouth daily 30 tablet 0    mupirocin (BACTROBAN) 2 % ointment Apply 3 times daily. 30 g 0    omeprazole (PRILOSEC) 20 MG delayed release capsule TAKE 2 CAPSULES BY MOUTH ONCE DAILY IN THE MORNING      Cholecalciferol 100 MCG (4000 UT) CAPS Take by mouth      Ascorbic Acid (VITAMIN C) 1000 MG tablet Take 1,000 mg by mouth daily      famotidine (PEPCID) 20 MG tablet Take 1 tablet by mouth 2 times daily 60 tablet 0    loratadine (CLARITIN) 10 MG tablet Take 10 mg by mouth daily       No current facility-administered medications for this visit. Review of Systems  Constitutional: No fever, no chills, no diaphoresis, no generalized weakness. HEENT: No blurred vision, No sore throat, no ear pain, no hair loss  Neck: denied any neck swelling, difficulty swallowing,   Cadrdiopulomary: No CP, SOB or palpitation, No orthopnea or PND. No cough or wheezing. GI: No N/V/D, no constipation, No abdominal pain, no melena or hematochezia   : Denied any dysuria, hematuria, flank pain, discharge, or incontinence. Skin: denied any rash, ulcer, Hirsute, or hyperpigmentation. MSK: denied any joint deformity, joint pain/swelling, muscle pain, or back pain.   Neuro: no numbess, no tingling, no weakness,     OBJECTIVE    /77   Pulse 73   Resp 18   Ht 5' 3\" (1.6 m)   Wt 187 lb (84.8 kg)   SpO2 97%   BMI 33.13 kg/m²   BP Readings from Last 4 Encounters:   09/22/21 113/77   07/15/21 124/70   05/06/21 126/70   04/30/21 132/76     Wt Readings from Last 6 Encounters:   09/22/21 187 lb (84.8 kg)   07/15/21 183 lb (83 kg)   05/06/21 180 lb (81.6 kg)   04/30/21 180 lb (81.6 kg)   04/29/21 180 lb 12.8 oz (82 kg)   03/26/21 170 lb (77.1 kg)       Physical examination:  General: awake alert, oriented x3, no abnormal position or movements. HEENT: normocephalic non traumatic  Neck: supple, no LN enlargement, no thyromegaly, no thyroid tenderness, no JVD. Pulm: Clear equal air entry no added sounds, no wheezing or rhonchi    CVS: S1 + S2, no murmur, no heave. Dorsalis pedis pulse palpable   Abd: soft lax, no tenderness, no organomegaly, audible bowel sounds. Skin: warm, no lesions, no rash.   Musculoskeletal: No back tenderness, no kyphosis/scoliosis   Neuro: CN intact, sensation normal , muscle power normal.  Psych: normal mood, and affect    Review of Laboratory Data:  I have reviewed the following:  Lab Results   Component Value Date/Time    WBC 7.1 07/15/2021 09:39 AM    RBC 4.40 07/15/2021 09:39 AM    HGB 12.8 07/15/2021 09:39 AM    HCT 39.8 07/15/2021 09:39 AM    MCV 90.5 07/15/2021 09:39 AM    MCH 29.1 07/15/2021 09:39 AM    MCHC 32.2 07/15/2021 09:39 AM    RDW 11.9 07/15/2021 09:39 AM     07/15/2021 09:39 AM    MPV 11.0 07/15/2021 09:39 AM      Lab Results   Component Value Date/Time     07/15/2021 09:39 AM    K 4.3 07/15/2021 09:39 AM    CO2 23 07/15/2021 09:39 AM    BUN 10 07/15/2021 09:39 AM    CREATININE 0.6 07/15/2021 09:39 AM    CALCIUM 9.1 07/15/2021 09:39 AM    LABGLOM >60 07/15/2021 09:39 AM    LABGLOM >60 03/28/2021 12:01 AM    GFRAA >60 07/15/2021 09:39 AM      Lab Results   Component Value Date/Time    TSH 1.520 07/15/2021 09:39 AM    T4FREE 0.85 (L) 07/15/2021 09:39 AM    C2DJQME 6.0 12/25/2014 03:04 PM     Lab Results   Component Value Date    LABA1C 5.3 05/31/2016    GLUCOSE 87 07/15/2021    GLUCOSE 123 03/28/2021     Lab Results   Component Value Date    TRIG 110 07/15/2021    HDL 42 07/15/2021    [address] 07/15/2021    CHOL 207 07/15/2021     Lab Results   Component Value Date    VITD25 34 07/15/2021    VITD25 34 04/02/2021       ASSESSMENT & RECOMMENDATIONS   Garret Rondon, a 36 y.o.-old female seen in for following issues     Primary hypothyroidism   · Currently on levothyroxine 75 mcg daily   · Check TFT now and adjust the dose if needed      Wt gain   · Ordered Referral to wt loss clinic     PCOS   · Pt currently not on OCP  · Will check   Free/Total testosterone quita DHEA-s  · Will evaluate for other cases of Oligomenorrhea by checking Hcg, PRL, TSH, FSH, E2  · Will evaluate for associated metabolic conditions (WTV9R, BG)     I personally reviewed external notes from PCP and other patient's care team providers, and personally interpreted labs associated with the above diagnosis. I also ordered labs to further assess and manage the above addressed medical conditions. Return in about 5 months (around 2/22/2022). The above issues were reviewed with the patient who understood and agreed with the plan. Thank you for allowing us to participate in the care of this patient. Please do not hesitate to contact us with any additional questions. Diagnosis Orders   1. PCOS (polycystic ovarian syndrome)  Testosterone, free, total    Estradiol    Follicle Stimulating Hormone    Luteinizing Hormone    Hemoglobin A1C    17-Hydroxyprogesterone    Comprehensive Metabolic Panel    DHEA-Sulfate   2. Hirsutism  Testosterone, free, total    Estradiol    Follicle Stimulating Hormone    Luteinizing Hormone    Hemoglobin A1C    17-Hydroxyprogesterone    Comprehensive Metabolic Panel    DHEA-Sulfate    dexamethasone (DECADRON) 1 MG tablet    Cortisol Total   3.  Hypothyroidism, unspecified type  TSH without Reflex    T4, Free    T4     Charley Gibson MD  Endocrinologist, [facility] Diabetes Care and Endocrinology   [address] [facility] [address]   Phone: [phone]  Fax: [phone]  ------------------------------  An electronic signature was used to authenticate this note.  Ness Trammell MD on 9/22/2021 at 8:16 AM

## 2021-09-26 ENCOUNTER — TELEPHONE (OUTPATIENT)
Dept: ENDOCRINOLOGY | Age: 31
End: 2021-09-26

## 2021-09-26 DIAGNOSIS — E66.09 CLASS 1 OBESITY DUE TO EXCESS CALORIES WITHOUT SERIOUS COMORBIDITY WITH BODY MASS INDEX (BMI) OF 32.0 TO 32.9 IN ADULT: Primary | ICD-10-CM

## 2021-09-26 NOTE — TELEPHONE ENCOUNTER
Notify pt,  I have reviewed your recent results    All results are excellent and Testosterone level wasn't elevated     I recommend seeing Dr. Lenin Hanson at [facility] loss clinic to help with Wt loss.  Is she is interested, please put the referral

## 2021-10-12 ENCOUNTER — E-VISIT (OUTPATIENT)
Dept: PRIMARY CARE CLINIC | Age: 31
End: 2021-10-12
Payer: COMMERCIAL

## 2021-10-12 DIAGNOSIS — B96.89 ACUTE BACTERIAL SINUSITIS: Primary | ICD-10-CM

## 2021-10-12 DIAGNOSIS — J01.90 ACUTE BACTERIAL SINUSITIS: Primary | ICD-10-CM

## 2021-10-12 PROCEDURE — 99422 OL DIG E/M SVC 11-20 MIN: CPT | Performed by: FAMILY MEDICINE

## 2021-10-12 RX ORDER — AZITHROMYCIN 250 MG/1
250 TABLET, FILM COATED ORAL SEE ADMIN INSTRUCTIONS
Qty: 6 TABLET | Refills: 0 | Status: SHIPPED | OUTPATIENT
Start: 2021-10-12 | End: 2021-10-17

## 2021-10-12 ASSESSMENT — LIFESTYLE VARIABLES: SMOKING_STATUS: NO, I'VE NEVER SMOKED

## 2021-11-17 RX ORDER — LEVOTHYROXINE SODIUM 0.07 MG/1
TABLET ORAL
Qty: 30 TABLET | Refills: 3 | Status: SHIPPED
Start: 2021-11-17 | End: 2021-12-06 | Stop reason: SDUPTHER

## 2021-12-06 RX ORDER — LEVOTHYROXINE SODIUM 0.07 MG/1
75 TABLET ORAL DAILY
Qty: 90 TABLET | Refills: 3 | Status: SHIPPED | OUTPATIENT
Start: 2021-12-06

## 2021-12-06 RX ORDER — CITALOPRAM 40 MG/1
40 TABLET ORAL DAILY
Qty: 90 TABLET | Refills: 1 | Status: SHIPPED
Start: 2021-12-06 | End: 2022-07-11

## 2021-12-08 ENCOUNTER — OFFICE VISIT (OUTPATIENT)
Dept: BARIATRICS/WEIGHT MGMT | Age: 31
End: 2021-12-08
Payer: COMMERCIAL

## 2021-12-08 VITALS
HEART RATE: 75 BPM | TEMPERATURE: 97.3 F | SYSTOLIC BLOOD PRESSURE: 116 MMHG | DIASTOLIC BLOOD PRESSURE: 77 MMHG | HEIGHT: 64 IN | WEIGHT: 184.2 LBS | BODY MASS INDEX: 31.45 KG/M2

## 2021-12-08 DIAGNOSIS — E06.3 HYPOTHYROIDISM DUE TO HASHIMOTO'S THYROIDITIS: Primary | ICD-10-CM

## 2021-12-08 DIAGNOSIS — E66.09 CLASS 1 OBESITY DUE TO EXCESS CALORIES WITHOUT SERIOUS COMORBIDITY WITH BODY MASS INDEX (BMI) OF 30.0 TO 30.9 IN ADULT: ICD-10-CM

## 2021-12-08 DIAGNOSIS — E03.8 HYPOTHYROIDISM DUE TO HASHIMOTO'S THYROIDITIS: Primary | ICD-10-CM

## 2021-12-08 PROCEDURE — 99202 OFFICE O/P NEW SF 15 MIN: CPT

## 2021-12-08 PROCEDURE — 99205 OFFICE O/P NEW HI 60 MIN: CPT | Performed by: INTERNAL MEDICINE

## 2021-12-08 NOTE — PROGRESS NOTES
CC -   Hypothyroidism, Obesity    BACKGROUND -   First visit: 12/08/21     Obesity   Began 2015  Initial BMI 32.1, Wt 187 lbs,   HS Grad wt 130 lbs   Lowest   wt  122 lbs  Highest  wt  198 lbs  Pattern of wt gain: rapid gain during preg's, then grad   Wt change past yr: 8 lbs  Most wt lost: 25 lbs (Calorie counting)  Other diets attempted: Keto, [employer], Vegetarian, Amazing 12 + exercise with , Phentermine (LOPEZ, had to stop), Contrave (nausea)    Desire to weight: 10/10  Prob posed by appetite: 8-9/10    Initial Diet:    Number of meals per day - 2    Number of snacks per day - 2-3    Meal volume - 12\" plate, sometimes seconds    Fast food/convenience store - 0x/week    Restaurants (not fast food) - 3x/week   Sweets - 4d/week (6 oreos)   Chips - 1-2d/week (1 oz)   Crackers/pretzels - 1-2d/week (4 Ritz PB crackers or 2 handfuls pretzels)   Nuts - 2d/week (2 handfuls)   Peanut Butter - 6d/week (two tablespoons)   Popcorn - 2d/week (100 - 350 olivia bag of MW)   Dried fruit - 0d/week   Whole fruit - 7d/week (3-4 servings)   Breakfast cereal - 3d/week (1 bowl Cheerios or Oatmeal)   Granola/Protein/Energy bar - 0d/week   Sugar sweetened beverages - 1 110cal protein coffee by [employer]/d, 1 cup tea, 2-3x/wk, each with 1 Tbs honey   Protein - 1 prot shate ([employer])   Fiber - No supplements     Exercise:    Gym membership - none    Walking - none    Running - none    Resistance - none    Aerobic class - 30 min, 3d/wk ([employer] phone anita)    ______________________    [address] [place] [address] -  Past Medical History:   Diagnosis Date    Asthma     excersise induced and allergy induced    Class 1 obesity due to excess calories without serious comorbidity with body mass index (BMI) of 30.0 to 30.9 in adult     Enlarged lymph node     Bx 2018, neg    Environmental allergies     Hypothyroidism due to Hashimoto's thyroiditis     IBS (irritable bowel syndrome)     Mitral valve prolapse     no regurgitation, with anxiety or caffeine developes palpitations,followed with Dr. Antonia Maya, has appt with Dr. Helena Felty 11/27/2018     Current Outpatient Medications   Medication Sig Dispense Refill    citalopram (CELEXA) 40 MG tablet Take 1 tablet by mouth daily 90 tablet 1    levothyroxine (SYNTHROID) 75 MCG tablet Take 1 tablet by mouth Daily 90 tablet 3    albuterol sulfate  (90 Base) MCG/ACT inhaler Inhale 2 puffs into the lungs 4 times daily as needed for Wheezing 1 Inhaler 0    valACYclovir (VALTREX) 1 g tablet Take 1 tablet by mouth 2 times daily (Patient taking differently: Take 1,000 mg by mouth as needed ) 20 tablet 1    Cholecalciferol 100 MCG (4000 UT) CAPS Take by mouth      Ascorbic Acid (VITAMIN C) 1000 MG tablet Take 1,000 mg by mouth daily      loratadine (CLARITIN) 10 MG tablet Take 10 mg by mouth daily       No current facility-administered medications for this visit. ROS -  Card - no CP  GI - no N/V/D/C    PE -  Gen : /77 (Site: Left Upper Arm, Position: Sitting, Cuff Size: Large Adult)   Pulse 75   Temp 97.3 °F (36.3 °C) (Temporal)   Ht 5' 3.5\" (1.613 m)   Wt 184 lb 3.2 oz (83.6 kg)   BMI 32.12 kg/m²    WN, WD, NAD  Lung: Nml resp effort  Psych: Normal mood   Full affect  Neuro:  Moves all ext well  ______________________    HISTORY & ASSESSMENT/PLAN -     Problem 1  - Hypothyroidism   HPI   - Began in 2015      Symptoms: no hypothyroid or hyperthyroid symptoms; no cold or heat intolerance, + chronic fatigue; chronic C/D      Regimen: LT4 75 mcg daily      9/22/21 TSH 1.23  Assessment  - Controlled  Plan   - Cont with present placement    Problem 2  - Obesity  HPI   - See above Background for description    Weight  Date    184.2 lbs 12/08/21  DEN = 1950 olivia/d = 13,650 olivia/wk  Wt effect of HR foods = Restaurant food 450 olivia/wk + Sweets 1320 + Chip 100 + Nuts/PB 1700 + Fruit 1225 +  = 4,920 olivia/wk = 700 olivia/d = 36% DEN = 70 lbs/yr  Frustrated at lack of weight loss despite determination and effort  Does not want a

## 2021-12-08 NOTE — PATIENT INSTRUCTIONS
Rules:  · Count every calorie every day  · Limit sweets to one day per month  · Limit chips/crackers/pretzels/nuts/popcorn/peanut butter to 100 olivia/day  · Eliminate all sugar sweetened beverages (including fruit juice)  · Limit restaurants (including fast food and food from a convenience store) to one time every two weeks  · Limit fruit to 100 olivia/day    Requirements:  · Make sure protein intake is at least 65 grams per day (do not count protein every day; instead spot check your intake every 2-3 weeks and make sure what you think you are getting is close to accurate; consider using a protein shake if needed; these are in the pharmacy section of the stores, not the grocery section; Premier, Pure Protein and Fairlife are relatively inexpensive and taste good to most patients; other options are Nectar, Boost Max, Ensure Max, BeneProtein and GNC lean (which is lactose-free); Nectar fruit, Premier Protein Clear, IsoPure Protein Drink, and Protein 2 O are water-based options; [employer] (or [employer], which is cheaper and is ordered on Amazon) and the Ang[employer] protein bars can also be used, but have less protein in them )  · Make sure that fiber intake is at least 22 grams per day. Do this by either eating 12 tablespoons of the original, plain Fiber One cereal every day or 4 tablespoons of wheat dextrin powder (Benefiber or a generic brand) every day. Work up to this amount slowly by starting with only one-eighth to one-fourth of the target amount and then adding another one-eighth to one-fourth every one or two weeks until reaching the target. · Take one multivitamin every day    Targets:  · Limit calorie intake to 1400 calories/day  · Walk 30 minutes daily  · Avoid eating 2 hours within bedtime. Tips:  · Do not eat outside of the dining room or the kitchen  · Do not eat while watching TV, videos, working on the computer or using a smart phone  · Do not eat food out of a multi-serving bag or container.   · Establish 6 hours of food-free periods (times you don't eat) each day. No period can be less than 1 hour long. The periods need to be the same every day for days that are the same (for example, workdays would have one set of food free periods and weekends would have another set of days). These six hours are in addition to the two hours before bedtime and the time spent sleeping.     Return to see me in 4-6 weeks

## 2022-01-06 ENCOUNTER — TELEPHONE (OUTPATIENT)
Dept: BARIATRICS/WEIGHT MGMT | Age: 32
End: 2022-01-06

## 2022-01-21 ENCOUNTER — OFFICE VISIT (OUTPATIENT)
Dept: PRIMARY CARE CLINIC | Age: 32
End: 2022-01-21
Payer: COMMERCIAL

## 2022-01-21 VITALS
BODY MASS INDEX: 31.24 KG/M2 | TEMPERATURE: 97.8 F | OXYGEN SATURATION: 98 % | HEIGHT: 64 IN | DIASTOLIC BLOOD PRESSURE: 70 MMHG | HEART RATE: 86 BPM | WEIGHT: 183 LBS | SYSTOLIC BLOOD PRESSURE: 122 MMHG

## 2022-01-21 DIAGNOSIS — R53.83 FATIGUE, UNSPECIFIED TYPE: ICD-10-CM

## 2022-01-21 DIAGNOSIS — R53.83 FATIGUE, UNSPECIFIED TYPE: Primary | ICD-10-CM

## 2022-01-21 DIAGNOSIS — E03.9 ACQUIRED HYPOTHYROIDISM: ICD-10-CM

## 2022-01-21 DIAGNOSIS — J40 BRONCHITIS: ICD-10-CM

## 2022-01-21 DIAGNOSIS — E55.9 VITAMIN D DEFICIENCY: ICD-10-CM

## 2022-01-21 DIAGNOSIS — M79.10 MYALGIA: ICD-10-CM

## 2022-01-21 LAB
ALBUMIN SERPL-MCNC: 4.6 G/DL (ref 3.5–5.2)
ALP BLD-CCNC: 94 U/L (ref 35–104)
ALT SERPL-CCNC: 11 U/L (ref 0–32)
ANION GAP SERPL CALCULATED.3IONS-SCNC: 16 MMOL/L (ref 7–16)
AST SERPL-CCNC: 20 U/L (ref 0–31)
BILIRUB SERPL-MCNC: 0.3 MG/DL (ref 0–1.2)
BUN BLDV-MCNC: 13 MG/DL (ref 6–20)
CALCIUM SERPL-MCNC: 9.6 MG/DL (ref 8.6–10.2)
CHLORIDE BLD-SCNC: 103 MMOL/L (ref 98–107)
CO2: 23 MMOL/L (ref 22–29)
CREAT SERPL-MCNC: 0.6 MG/DL (ref 0.5–1)
FOLATE: >20 NG/ML (ref 4.8–24.2)
GFR AFRICAN AMERICAN: >60
GFR NON-AFRICAN AMERICAN: >60 ML/MIN/1.73
GLUCOSE BLD-MCNC: 91 MG/DL (ref 74–99)
HCT VFR BLD CALC: 41.1 % (ref 34–48)
HEMOGLOBIN: 13.3 G/DL (ref 11.5–15.5)
MCH RBC QN AUTO: 28.8 PG (ref 26–35)
MCHC RBC AUTO-ENTMCNC: 32.4 % (ref 32–34.5)
MCV RBC AUTO: 89 FL (ref 80–99.9)
PDW BLD-RTO: 11.9 FL (ref 11.5–15)
PLATELET # BLD: 242 E9/L (ref 130–450)
PMV BLD AUTO: 11.1 FL (ref 7–12)
POTASSIUM SERPL-SCNC: 4.7 MMOL/L (ref 3.5–5)
RBC # BLD: 4.62 E12/L (ref 3.5–5.5)
SODIUM BLD-SCNC: 142 MMOL/L (ref 132–146)
T4 FREE: 1.21 NG/DL (ref 0.93–1.7)
TOTAL PROTEIN: 8.4 G/DL (ref 6.4–8.3)
TSH SERPL DL<=0.05 MIU/L-ACNC: 1 UIU/ML (ref 0.27–4.2)
VITAMIN B-12: 974 PG/ML (ref 211–946)
VITAMIN D 25-HYDROXY: 44 NG/ML (ref 30–100)
WBC # BLD: 7.6 E9/L (ref 4.5–11.5)

## 2022-01-21 PROCEDURE — 99214 OFFICE O/P EST MOD 30 MIN: CPT | Performed by: FAMILY MEDICINE

## 2022-01-21 RX ORDER — AZITHROMYCIN 250 MG/1
250 TABLET, FILM COATED ORAL SEE ADMIN INSTRUCTIONS
Qty: 6 TABLET | Refills: 0 | Status: SHIPPED | OUTPATIENT
Start: 2022-01-21 | End: 2022-01-26

## 2022-01-21 RX ORDER — VALACYCLOVIR HYDROCHLORIDE 1 G/1
1000 TABLET, FILM COATED ORAL 3 TIMES DAILY
Qty: 21 TABLET | Refills: 0 | Status: SHIPPED | OUTPATIENT
Start: 2022-01-21 | End: 2022-01-28

## 2022-01-21 RX ORDER — PREDNISONE 10 MG/1
TABLET ORAL
Qty: 18 TABLET | Refills: 0 | Status: SHIPPED
Start: 2022-01-21 | End: 2022-03-22 | Stop reason: SDUPTHER

## 2022-01-21 ASSESSMENT — ENCOUNTER SYMPTOMS
SORE THROAT: 1
VOMITING: 0
BACK PAIN: 0
BLOOD IN STOOL: 0
NAUSEA: 0
APNEA: 0
WHEEZING: 0
COUGH: 1
SINUS PRESSURE: 1
EYE ITCHING: 0
DIARRHEA: 0
COLOR CHANGE: 0
RHINORRHEA: 0
CONSTIPATION: 0
CHEST TIGHTNESS: 0
EYE PAIN: 0
SHORTNESS OF BREATH: 0
ABDOMINAL PAIN: 0
SINUS COMPLAINT: 1
EYE REDNESS: 0

## 2022-01-21 NOTE — PROGRESS NOTES
Chief Complaint:     Chief Complaint   Patient presents with    Sinus Problem    Chest Congestion    Fatigue    Hypothyroidism         Sinus Problem  This is a new problem. The current episode started in the past 7 days. The problem is unchanged. There has been no fever. The pain is mild. Associated symptoms include congestion, coughing, headaches, sinus pressure and a sore throat. Pertinent negatives include no ear pain, neck pain or shortness of breath. Past treatments include nothing. The treatment provided no relief. Fatigue  This is a recurrent problem. The current episode started 1 to 4 weeks ago. The problem occurs daily. The problem has been unchanged. Associated symptoms include congestion, coughing, fatigue, headaches, myalgias and a sore throat. Pertinent negatives include no abdominal pain, arthralgias, chest pain, fever, nausea, neck pain, numbness, rash, vomiting or weakness. Nothing aggravates the symptoms. She has tried nothing for the symptoms. The treatment provided no relief. Other  This is a recurrent problem. The current episode started more than 1 month ago. The problem occurs intermittently. The problem has been waxing and waning. Associated symptoms include congestion, coughing, fatigue, headaches, myalgias and a sore throat. Pertinent negatives include no abdominal pain, arthralgias, chest pain, fever, nausea, neck pain, numbness, rash, vomiting or weakness. Nothing aggravates the symptoms. She has tried nothing for the symptoms. The treatment provided no relief.        Patient Active Problem List   Diagnosis    Fatigue    Acquired hypothyroidism    Vitamin D deficiency    Gastroesophageal reflux disease without esophagitis    S/P tonsillectomy    Myalgia    Anxiety    Breast mass, left    Seasonal allergies    Mixed hyperlipidemia    Class 1 obesity due to excess calories without serious comorbidity with body mass index (BMI) of 30.0 to 30.9 in adult    Hypothyroidism due to Hashimoto's thyroiditis       Past Medical History:   Diagnosis Date    Anxiety     Asthma     excersise induced and allergy induced    Class 1 obesity due to excess calories without serious comorbidity with body mass index (BMI) of 30.0 to 30.9 in adult     Enlarged lymph node     Bx 2018, neg    Environmental allergies     Hypothyroidism due to Hashimoto's thyroiditis     IBS (irritable bowel syndrome)     Mitral valve prolapse     no regurgitation, with anxiety or caffeine developes palpitations,followed with Dr. Hector Quinteros, has appt with Dr. Servando Jesus 2018       Past Surgical History:   Procedure Laterality Date     SECTION      2 children     COLONOSCOPY  16    DENTAL SURGERY      extractions    CA BIOPSY OROPHARYNX Left 2018    LEFT TONGUE  BIOPSY performed by Derick Lind MD at [facility] 61 OF TONSILS,12+ Y/O N/A 2018    TONSILLECTOMY performed by [facility] at [facility] 57 N/A 2018    TONSILLECTOMY POSTOP HEMORRHAGE CONTROL performed by [facility] at [facility] OR       Current Outpatient Medications   Medication Sig Dispense Refill    predniSONE (DELTASONE) 10 MG tablet 30mg daily x3 days, then 20mg daily x3 days, then 10mg daily x3 days 18 tablet 0    azithromycin (ZITHROMAX) 250 MG tablet Take 1 tablet by mouth See Admin Instructions for 5 days 500mg on day 1 followed by 250mg on days 2 - 5 6 tablet 0    valACYclovir (VALTREX) 1 g tablet Take 1 tablet by mouth 3 times daily for 7 days 21 tablet 0    citalopram (CELEXA) 40 MG tablet Take 1 tablet by mouth daily 90 tablet 1    levothyroxine (SYNTHROID) 75 MCG tablet Take 1 tablet by mouth Daily 90 tablet 3    albuterol sulfate  (90 Base) MCG/ACT inhaler Inhale 2 puffs into the lungs 4 times daily as needed for Wheezing 1 Inhaler 0    valACYclovir (VALTREX) 1 g tablet Take 1 tablet by mouth 2 times daily (Patient taking differently: Take 1,000 mg by mouth as needed ) 20 tablet 1    Cholecalciferol 100 MCG (4000 UT) CAPS Take by mouth      Ascorbic Acid (VITAMIN C) 1000 MG tablet Take 1,000 mg by mouth daily      loratadine (CLARITIN) 10 MG tablet Take 10 mg by mouth daily       No current facility-administered medications for this visit. Allergies   Allergen Reactions    Cefdinir Hives     Had reaction as a child  Other reaction(s): AOF    Lomedia 24 Fe [Norethin Ace-Eth Estrad-Fe] Hives    Nutritional Supplements      Other reaction(s): AOF    Sulfa Antibiotics Hives     Joint swell       Social History     Socioeconomic History    Marital status:      Spouse name: Not on file    Number of children: Not on file    Years of education: Not on file    Highest education level: Not on file   Occupational History     Employer: [employer]     Employer: [employer]   Tobacco Use    Smoking status: Never Smoker    Smokeless tobacco: Never Used   Vaping Use    Vaping Use: Never used   Substance and Sexual Activity    Alcohol use: Yes     Comment: wine once in a while    Drug use: No    Sexual activity: Not on file   Other Topics Concern    Not on file   Social History Narrative    Not on file     Social Determinants of Health     Financial Resource Strain:     Difficulty of Paying Living Expenses: Not on file   Food Insecurity:     Worried About Running Out of Food in the Last Year: Not on file    Kendrick of Food in the Last Year: Not on file   Transportation Needs:     Lack of Transportation (Medical): Not on file    Lack of Transportation (Non-Medical):  Not on file   Physical Activity:     Days of Exercise per Week: Not on file    Minutes of Exercise per Session: Not on file   Stress:     Feeling of Stress : Not on file   Social Connections:     Frequency of Communication with Friends and Family: Not on file    Frequency of Social Gatherings with Friends and Family: Not on file    Attends Judaism Services: Not on file    Active Member of Clubs or Organizations: Not on file    Attends Club or Organization Meetings: Not on file    Marital Status: Not on file   Intimate Partner Violence:     Fear of Current or Ex-Partner: Not on file    Emotionally Abused: Not on file    Physically Abused: Not on file    Sexually Abused: Not on file   Housing Stability:     Unable to Pay for Housing in the Last Year: Not on file    Number of [place] in the Last Year: Not on file    Unstable Housing in the Last Year: Not on file       Family History   Problem Relation Age of Onset    Hypertension Mother         pulmonary hypertension     High Blood Pressure Mother     Other Mother     Heart Disease Father     Kidney Disease Father     Hypertension Father         IBS & polyps    Other Sister         gastric ulcer & IBS    Cancer Paternal Aunt 46        breast          Review of Systems   Constitutional: Positive for fatigue. Negative for activity change, appetite change and fever. HENT: Positive for congestion, sinus pressure and sore throat. Negative for ear pain, hearing loss, nosebleeds and rhinorrhea. Eyes: Negative for pain, redness, itching and visual disturbance. Respiratory: Positive for cough. Negative for apnea, chest tightness, shortness of breath and wheezing. Cardiovascular: Negative for chest pain, palpitations and leg swelling. Gastrointestinal: Negative for abdominal pain, blood in stool, constipation, diarrhea, nausea and vomiting. Endocrine: Negative. Genitourinary: Negative for decreased urine volume, difficulty urinating, dysuria, frequency, hematuria and urgency. Musculoskeletal: Positive for myalgias. Negative for arthralgias, back pain, gait problem and neck pain. Skin: Negative for color change and rash. Allergic/Immunologic: Negative for environmental allergies and food allergies. Neurological: Positive for headaches.  Negative for dizziness, weakness, light-headedness and numbness. Hematological: Negative for adenopathy. Does not bruise/bleed easily. Psychiatric/Behavioral: Negative for behavioral problems, dysphoric mood and sleep disturbance. The patient is not nervous/anxious and is not hyperactive. All other systems reviewed and are negative. /70   Pulse 86   Temp 97.8 °F (36.6 °C)   Ht 5' 4\" (1.626 m)   Wt 183 lb (83 kg)   SpO2 98%   BMI 31.41 kg/m²     Physical Exam  Vitals and nursing note reviewed. Constitutional:       General: She is not in acute distress. Appearance: Normal appearance. She is well-developed. HENT:      Head: Normocephalic and atraumatic. Right Ear: Hearing, tympanic membrane and external ear normal. No tenderness. No middle ear effusion. Left Ear: Hearing, tympanic membrane and external ear normal. No tenderness. No middle ear effusion. Nose: Nose normal. No congestion or rhinorrhea. Right Turbinates: Not enlarged. Left Turbinates: Not enlarged. Mouth/Throat:      Mouth: Mucous membranes are moist.      Tongue: No lesions. Pharynx: Oropharynx is clear. No oropharyngeal exudate or posterior oropharyngeal erythema. Eyes:      General: No scleral icterus. Conjunctiva/sclera: Conjunctivae normal.      Pupils: Pupils are equal, round, and reactive to light. Neck:      Thyroid: No thyromegaly. Cardiovascular:      Rate and Rhythm: Normal rate and regular rhythm. Heart sounds: Normal heart sounds. No murmur heard. Pulmonary:      Effort: Pulmonary effort is normal. No respiratory distress. Breath sounds: Normal breath sounds. No wheezing or rales. Abdominal:      General: Bowel sounds are normal. There is no distension. Palpations: Abdomen is soft. Tenderness: There is no abdominal tenderness. Musculoskeletal:         General: No tenderness. Normal range of motion. Cervical back: Normal range of motion and neck supple.  No rigidity. No muscular tenderness. Lymphadenopathy:      Cervical: No cervical adenopathy. Skin:     General: Skin is warm and dry. Findings: No erythema or rash. Neurological:      General: No focal deficit present. Mental Status: She is alert and oriented to person, place, and time. Cranial Nerves: No cranial nerve deficit. Deep Tendon Reflexes: Reflexes are normal and symmetric. Reflexes normal.   Psychiatric:         Mood and Affect: Mood normal.                                 ASSESSMENT/PLAN:    Patient Active Problem List   Diagnosis    Fatigue    Acquired hypothyroidism    Vitamin D deficiency    Gastroesophageal reflux disease without esophagitis    S/P tonsillectomy    Myalgia    Anxiety    Breast mass, left    Seasonal allergies    Mixed hyperlipidemia    Class 1 obesity due to excess calories without serious comorbidity with body mass index (BMI) of 30.0 to 30.9 in adult    Hypothyroidism due to Hashimoto's thyroiditis       Garret was seen today for sinus problem, chest congestion, fatigue and hypothyroidism. Diagnoses and all orders for this visit:    Fatigue, unspecified type  -     CBC; Future  -     Comprehensive Metabolic Panel; Future  -     TSH without Reflex; Future  -     T4, Free; Future  -     Vitamin B12 & Folate; Future    Myalgia    Vitamin D deficiency  -     Vitamin D 25 Hydroxy; Future    Acquired hypothyroidism  -     TSH without Reflex; Future  -     T4, Free; Future    Bronchitis  -     XR CHEST (2 VW); Future  -     azithromycin (ZITHROMAX) 250 MG tablet; Take 1 tablet by mouth See Admin Instructions for 5 days 500mg on day 1 followed by 250mg on days 2 - 5    Other orders  -     predniSONE (DELTASONE) 10 MG tablet; 30mg daily x3 days, then 20mg daily x3 days, then 10mg daily x3 days  -     valACYclovir (VALTREX) 1 g tablet; Take 1 tablet by mouth 3 times daily for 7 days          Return if symptoms worsen or fail to improve.       I spent 30 minutes with this patient. I spent greater than 50% of the time counseling this patient.         Tiara Diamond DO  1/21/2022  11:21 AM

## 2022-01-25 ENCOUNTER — PATIENT MESSAGE (OUTPATIENT)
Dept: PRIMARY CARE CLINIC | Age: 32
End: 2022-01-25

## 2022-03-10 ENCOUNTER — PATIENT MESSAGE (OUTPATIENT)
Dept: PRIMARY CARE CLINIC | Age: 32
End: 2022-03-10

## 2022-03-10 RX ORDER — MONTELUKAST SODIUM 10 MG/1
10 TABLET ORAL DAILY
Qty: 30 TABLET | Refills: 0 | Status: SHIPPED | OUTPATIENT
Start: 2022-03-10

## 2022-03-22 ENCOUNTER — E-VISIT (OUTPATIENT)
Dept: PRIMARY CARE CLINIC | Age: 32
End: 2022-03-22
Payer: COMMERCIAL

## 2022-03-22 DIAGNOSIS — J11.1 INFLUENZA: Primary | ICD-10-CM

## 2022-03-22 PROCEDURE — 99422 OL DIG E/M SVC 11-20 MIN: CPT | Performed by: FAMILY MEDICINE

## 2022-03-22 RX ORDER — PREDNISONE 10 MG/1
TABLET ORAL
Qty: 18 TABLET | Refills: 0 | Status: SHIPPED
Start: 2022-03-22 | End: 2022-07-12

## 2022-03-22 ASSESSMENT — LIFESTYLE VARIABLES: SMOKING_STATUS: NO, I HAVE NEVER SMOKED

## 2022-04-07 ENCOUNTER — PATIENT MESSAGE (OUTPATIENT)
Dept: PRIMARY CARE CLINIC | Age: 32
End: 2022-04-07

## 2022-04-07 ENCOUNTER — TELEPHONE (OUTPATIENT)
Dept: PRIMARY CARE CLINIC | Age: 32
End: 2022-04-07

## 2022-04-07 DIAGNOSIS — N39.0 URINARY TRACT INFECTION WITHOUT HEMATURIA, SITE UNSPECIFIED: ICD-10-CM

## 2022-04-07 DIAGNOSIS — N39.0 URINARY TRACT INFECTION WITHOUT HEMATURIA, SITE UNSPECIFIED: Primary | ICD-10-CM

## 2022-04-07 LAB
AMORPHOUS: ABNORMAL
BACTERIA: ABNORMAL /HPF
BILIRUBIN URINE: NEGATIVE
BLOOD, URINE: NEGATIVE
CLARITY: CLEAR
COLOR: YELLOW
GLUCOSE URINE: NEGATIVE MG/DL
KETONES, URINE: NEGATIVE MG/DL
LEUKOCYTE ESTERASE, URINE: ABNORMAL
NITRITE, URINE: NEGATIVE
PH UA: 7 (ref 5–9)
PROTEIN UA: NEGATIVE MG/DL
RBC UA: ABNORMAL /HPF (ref 0–2)
SPECIFIC GRAVITY UA: 1.01 (ref 1–1.03)
UROBILINOGEN, URINE: 0.2 E.U./DL
WBC UA: ABNORMAL /HPF (ref 0–5)

## 2022-04-07 RX ORDER — FLUCONAZOLE 100 MG/1
100 TABLET ORAL DAILY
Qty: 3 TABLET | Refills: 0 | Status: SHIPPED | OUTPATIENT
Start: 2022-04-07 | End: 2022-04-10

## 2022-04-07 RX ORDER — NITROFURANTOIN 25; 75 MG/1; MG/1
100 CAPSULE ORAL 2 TIMES DAILY
Qty: 20 CAPSULE | Refills: 0 | Status: SHIPPED | OUTPATIENT
Start: 2022-04-07 | End: 2022-04-17

## 2022-04-07 NOTE — TELEPHONE ENCOUNTER
From: Monik Rondon  To: Dr. Hernandez Short: 4/7/2022 8:38 AM EDT  Subject: UTI    Hey Doctor Phyllistine Duverney, so I feel like I have a UTU plus a yeast infection. I took macrobid that expires next month it was 5 days worth 2 twice daily 100 mg and it ended on Monday. I also had two diflucan pills of 150 mg I took those Monday and Tuesday. I never had an actual Culture or Sensitivity or a urinalysis. Can I stop by and get one at the lab and see if I need further treatment?  Thank you

## 2022-04-08 NOTE — TELEPHONE ENCOUNTER
Per pt there is an interaction between diflucan and her celexa.  She is asking if you can send in a diff med other then the diflucan

## 2022-04-10 LAB — URINE CULTURE, ROUTINE: NORMAL

## 2022-04-25 ENCOUNTER — PATIENT MESSAGE (OUTPATIENT)
Dept: PRIMARY CARE CLINIC | Age: 32
End: 2022-04-25

## 2022-04-25 NOTE — LETTER
[address]  Nasrin [place] [address]  Phone: [phone]  Fax: [address], [place]        April 26, 2022     Patient: Omid Arroyo   YOB: 1990   Date of Visit: 4/25/2022       To Whom It May Concern: It is my medical opinion that Flor Guaman has been under my care for several years. She is being treated for anxiety disorder. It is my opinion that her pet serves as a therapy/comfort animal for her as it helps reduce her anxiety issues. As such, I recommend that she be allowed to keep her pet with her in her rental property at this time in order to prevent a sharp, sudden decline in her mental health condition. Thank you for your cooperation in this matter. If you have any questions or concerns, please don't hesitate to call.     Sincerely,        Deonte Moreno, DO

## 2022-04-26 NOTE — TELEPHONE ENCOUNTER
From: Mariah Rondon  To: Dr. Efrem Kelly: 4/25/2022 8:46 PM EDT  Subject: Mental health    Hi Doctor Elisabeth Yip,  This may or may not apply to you but my  and I are saving for a house so right now we are renting. We are not allowed to have pets but with my anxiety and mental health issues I wanted to see if you could write for a therapy pet. My dad is also going through some scary health issues right now I know my family could find some miguel angel in a dog. Anyway thank you so much.

## 2022-07-11 RX ORDER — CITALOPRAM 40 MG/1
TABLET ORAL
Qty: 90 TABLET | Refills: 3 | Status: SHIPPED
Start: 2022-07-11 | End: 2022-07-12 | Stop reason: SDUPTHER

## 2022-07-12 ENCOUNTER — OFFICE VISIT (OUTPATIENT)
Dept: PRIMARY CARE CLINIC | Age: 32
End: 2022-07-12
Payer: COMMERCIAL

## 2022-07-12 VITALS
WEIGHT: 173 LBS | HEART RATE: 64 BPM | BODY MASS INDEX: 29.7 KG/M2 | SYSTOLIC BLOOD PRESSURE: 100 MMHG | DIASTOLIC BLOOD PRESSURE: 62 MMHG | OXYGEN SATURATION: 98 % | TEMPERATURE: 98 F

## 2022-07-12 DIAGNOSIS — R53.83 FATIGUE, UNSPECIFIED TYPE: ICD-10-CM

## 2022-07-12 DIAGNOSIS — E55.9 VITAMIN D DEFICIENCY: ICD-10-CM

## 2022-07-12 DIAGNOSIS — E03.8 HYPOTHYROIDISM DUE TO HASHIMOTO'S THYROIDITIS: ICD-10-CM

## 2022-07-12 DIAGNOSIS — Z00.01 ENCOUNTER FOR GENERAL ADULT MEDICAL EXAMINATION WITH ABNORMAL FINDINGS: ICD-10-CM

## 2022-07-12 DIAGNOSIS — E06.3 HYPOTHYROIDISM DUE TO HASHIMOTO'S THYROIDITIS: ICD-10-CM

## 2022-07-12 DIAGNOSIS — E03.9 ACQUIRED HYPOTHYROIDISM: ICD-10-CM

## 2022-07-12 DIAGNOSIS — R73.03 PREDIABETES: ICD-10-CM

## 2022-07-12 DIAGNOSIS — D51.0 PERNICIOUS ANEMIA: ICD-10-CM

## 2022-07-12 DIAGNOSIS — Z00.01 ENCOUNTER FOR GENERAL ADULT MEDICAL EXAMINATION WITH ABNORMAL FINDINGS: Primary | ICD-10-CM

## 2022-07-12 LAB
ALBUMIN SERPL-MCNC: 4.8 G/DL (ref 3.5–5.2)
ALP BLD-CCNC: 78 U/L (ref 35–104)
ALT SERPL-CCNC: 13 U/L (ref 0–32)
ANION GAP SERPL CALCULATED.3IONS-SCNC: 10 MMOL/L (ref 7–16)
AST SERPL-CCNC: 19 U/L (ref 0–31)
BILIRUB SERPL-MCNC: 0.3 MG/DL (ref 0–1.2)
BUN BLDV-MCNC: 14 MG/DL (ref 6–20)
CALCIUM SERPL-MCNC: 9.3 MG/DL (ref 8.6–10.2)
CHLORIDE BLD-SCNC: 102 MMOL/L (ref 98–107)
CHOLESTEROL, TOTAL: 241 MG/DL (ref 0–199)
CO2: 27 MMOL/L (ref 22–29)
CREAT SERPL-MCNC: 0.7 MG/DL (ref 0.5–1)
GFR AFRICAN AMERICAN: >60
GFR NON-AFRICAN AMERICAN: >60 ML/MIN/1.73
GLUCOSE BLD-MCNC: 90 MG/DL (ref 74–99)
HBA1C MFR BLD: 4.8 % (ref 4–5.6)
HCT VFR BLD CALC: 39.2 % (ref 34–48)
HDLC SERPL-MCNC: 43 MG/DL
HEMOGLOBIN: 12.7 G/DL (ref 11.5–15.5)
LDL CHOLESTEROL CALCULATED: 170 MG/DL (ref 0–99)
MCH RBC QN AUTO: 29.2 PG (ref 26–35)
MCHC RBC AUTO-ENTMCNC: 32.4 % (ref 32–34.5)
MCV RBC AUTO: 90.1 FL (ref 80–99.9)
PDW BLD-RTO: 11.9 FL (ref 11.5–15)
PLATELET # BLD: 227 E9/L (ref 130–450)
PMV BLD AUTO: 11.1 FL (ref 7–12)
POTASSIUM SERPL-SCNC: 4.7 MMOL/L (ref 3.5–5)
RBC # BLD: 4.35 E12/L (ref 3.5–5.5)
SODIUM BLD-SCNC: 139 MMOL/L (ref 132–146)
TOTAL PROTEIN: 7.5 G/DL (ref 6.4–8.3)
TRIGL SERPL-MCNC: 138 MG/DL (ref 0–149)
TSH SERPL DL<=0.05 MIU/L-ACNC: 1.53 UIU/ML (ref 0.27–4.2)
VITAMIN D 25-HYDROXY: 45 NG/ML (ref 30–100)
VLDLC SERPL CALC-MCNC: 28 MG/DL
WBC # BLD: 6 E9/L (ref 4.5–11.5)

## 2022-07-12 PROCEDURE — 99395 PREV VISIT EST AGE 18-39: CPT | Performed by: FAMILY MEDICINE

## 2022-07-12 RX ORDER — CITALOPRAM 40 MG/1
TABLET ORAL
Qty: 90 TABLET | Refills: 3 | Status: SHIPPED | OUTPATIENT
Start: 2022-07-12

## 2022-07-12 SDOH — ECONOMIC STABILITY: FOOD INSECURITY: WITHIN THE PAST 12 MONTHS, YOU WORRIED THAT YOUR FOOD WOULD RUN OUT BEFORE YOU GOT MONEY TO BUY MORE.: PATIENT DECLINED

## 2022-07-12 SDOH — ECONOMIC STABILITY: FOOD INSECURITY: WITHIN THE PAST 12 MONTHS, THE FOOD YOU BOUGHT JUST DIDN'T LAST AND YOU DIDN'T HAVE MONEY TO GET MORE.: PATIENT DECLINED

## 2022-07-12 ASSESSMENT — ENCOUNTER SYMPTOMS
APNEA: 0
DIARRHEA: 0
WHEEZING: 0
EYE PAIN: 0
NAUSEA: 0
CHEST TIGHTNESS: 0
COLOR CHANGE: 0
CONSTIPATION: 0
EYE REDNESS: 0
SORE THROAT: 0
ABDOMINAL PAIN: 0
SINUS PRESSURE: 0
BLOOD IN STOOL: 0
COUGH: 0
RHINORRHEA: 0
EYE ITCHING: 0
SHORTNESS OF BREATH: 0
VOMITING: 0
BACK PAIN: 0

## 2022-07-12 ASSESSMENT — SOCIAL DETERMINANTS OF HEALTH (SDOH): HOW HARD IS IT FOR YOU TO PAY FOR THE VERY BASICS LIKE FOOD, HOUSING, MEDICAL CARE, AND HEATING?: PATIENT DECLINED

## 2022-07-12 ASSESSMENT — PATIENT HEALTH QUESTIONNAIRE - PHQ9
SUM OF ALL RESPONSES TO PHQ QUESTIONS 1-9: 0
2. FEELING DOWN, DEPRESSED OR HOPELESS: 0
SUM OF ALL RESPONSES TO PHQ QUESTIONS 1-9: 0
SUM OF ALL RESPONSES TO PHQ QUESTIONS 1-9: 0
SUM OF ALL RESPONSES TO PHQ9 QUESTIONS 1 & 2: 0
SUM OF ALL RESPONSES TO PHQ QUESTIONS 1-9: 0
1. LITTLE INTEREST OR PLEASURE IN DOING THINGS: 0

## 2022-07-12 NOTE — PROGRESS NOTES
Chief Complaint:     Chief Complaint   Patient presents with    Annual Exam    Follow-up     pt states that she is good but has been feeling more thirsty    Fatigue    Hypothyroidism         Fatigue  This is a recurrent problem. The current episode started more than 1 month ago. The problem occurs intermittently. The problem has been waxing and waning. Associated symptoms include fatigue. Pertinent negatives include no abdominal pain, arthralgias, chest pain, congestion, coughing, fever, headaches, myalgias, nausea, neck pain, numbness, rash, sore throat, vomiting or weakness. Nothing aggravates the symptoms. She has tried nothing for the symptoms. The treatment provided no relief. Other  Associated symptoms include fatigue. Pertinent negatives include no abdominal pain, arthralgias, chest pain, congestion, coughing, fever, headaches, myalgias, nausea, neck pain, numbness, rash, sore throat, vomiting or weakness. Hyperlipidemia  This is a recurrent problem. The current episode started more than 1 month ago. Exacerbating diseases include hypothyroidism. She has no history of diabetes or obesity. There are no known factors aggravating her hyperlipidemia. Pertinent negatives include no chest pain, myalgias or shortness of breath. She is currently on no antihyperlipidemic treatment. The current treatment provides significant improvement of lipids. There are no compliance problems. Risk factors for coronary artery disease include stress.        Patient Active Problem List   Diagnosis    Fatigue    Acquired hypothyroidism    Vitamin D deficiency    Gastroesophageal reflux disease without esophagitis    S/P tonsillectomy    Myalgia    Anxiety    Breast mass, left    Seasonal allergies    Mixed hyperlipidemia    Class 1 obesity due to excess calories without serious comorbidity with body mass index (BMI) of 30.0 to 30.9 in adult    Hypothyroidism due to Hashimoto's thyroiditis       Past Medical History: Diagnosis Date    Anxiety     Asthma     excersise induced and allergy induced    Class 1 obesity due to excess calories without serious comorbidity with body mass index (BMI) of 30.0 to 30.9 in adult     Enlarged lymph node     Bx 2018, neg    Environmental allergies     Hypothyroidism due to Hashimoto's thyroiditis     IBS (irritable bowel syndrome)     Mitral valve prolapse     no regurgitation, with anxiety or caffeine developes palpitations,followed with Dr. Praveen Brenner, has appt with Dr. Jurgen Erazo 2018       Past Surgical History:   Procedure Laterality Date     SECTION      2 children     COLONOSCOPY  16    DENTAL SURGERY      extractions    NH BIOPSY OROPHARYNX Left 2018    LEFT TONGUE  BIOPSY performed by Will Child., MD at [facility] 61 OF TONSILS,12+ Y/O N/A 2018    TONSILLECTOMY performed by Will Child., MD at [facility] 57 N/A 2018    TONSILLECTOMY POSTOP HEMORRHAGE CONTROL performed by Will Child., MD at [facility] OR       Current Outpatient Medications   Medication Sig Dispense Refill    citalopram (CELEXA) 40 MG tablet TAKE 1 TABLET DAILY 90 tablet 3    clonazePAM (KLONOPIN) 0.5 MG tablet take 1 tablet by mouth twice a day if needed for anxiety 60 tablet 0    montelukast (SINGULAIR) 10 MG tablet Take 1 tablet by mouth daily 30 tablet 0    levothyroxine (SYNTHROID) 75 MCG tablet Take 1 tablet by mouth Daily 90 tablet 3    albuterol sulfate  (90 Base) MCG/ACT inhaler Inhale 2 puffs into the lungs 4 times daily as needed for Wheezing 1 Inhaler 0    valACYclovir (VALTREX) 1 g tablet Take 1 tablet by mouth 2 times daily (Patient taking differently: Take 1,000 mg by mouth as needed ) 20 tablet 1    Cholecalciferol 100 MCG (4000 UT) CAPS Take by mouth      Ascorbic Acid (VITAMIN C) 1000 MG tablet Take 1,000 mg by mouth daily      loratadine (CLARITIN) 10 MG tablet Take 10 mg by mouth daily       No current facility-administered medications for this visit. Allergies   Allergen Reactions    Cefdinir Hives     Had reaction as a child  Other reaction(s): AOF    Lomedia 24 Fe [Norethin Ace-Eth Estrad-Fe] Hives    Nutritional Supplements      Other reaction(s): AOF    Sulfa Antibiotics Hives     Joint swell       Social History     Socioeconomic History    Marital status:      Spouse name: None    Number of children: None    Years of education: None    Highest education level: None   Occupational History     Employer: [employer]     Employer: [employer]   Tobacco Use    Smoking status: Never Smoker    Smokeless tobacco: Never Used   Vaping Use    Vaping Use: Never used   Substance and Sexual Activity    Alcohol use: Yes     Comment: wine once in a while    Drug use: No    Sexual activity: None   Other Topics Concern    None   Social History Narrative    None     Social Determinants of Health     Financial Resource Strain: Unknown    Difficulty of Paying Living Expenses: Patient refused   Food Insecurity: Unknown    Worried About Running Out of Food in the Last Year: Patient refused    Ran Out of Food in the Last Year: Patient refused   Transportation Needs:     Lack of Transportation (Medical): Not on file    Lack of Transportation (Non-Medical):  Not on file   Physical Activity:     Days of Exercise per Week: Not on file    Minutes of Exercise per Session: Not on file   Stress:     Feeling of Stress : Not on file   Social Connections:     Frequency of Communication with Friends and Family: Not on file    Frequency of Social Gatherings with Friends and Family: Not on file    Attends Pent[employer]ism Services: Not on file    Active Member of Clubs or Organizations: Not on file    Attends Club or Organization Meetings: Not on file    Marital Status: Not on file   Intimate Partner Violence:     Fear of Current or Ex-Partner: Not on file    Emotionally Abused: Not on file  Physically Abused: Not on file    Sexually Abused: Not on file   Housing Stability:     Unable to Pay for Housing in the Last Year: Not on file    Number of Places Lived in the Last Year: Not on file    Unstable Housing in the Last Year: Not on file       Family History   Problem Relation Age of Onset    Hypertension Mother         pulmonary hypertension     High Blood Pressure Mother     Other Mother     Heart Disease Father     Kidney Disease Father     Hypertension Father         IBS & polyps    Other Sister         gastric ulcer & IBS    Cancer Paternal Aunt 46        breast          Review of Systems   Constitutional: Positive for fatigue. Negative for activity change, appetite change and fever. HENT: Negative for congestion, ear pain, hearing loss, nosebleeds, rhinorrhea, sinus pressure and sore throat. Eyes: Negative for pain, redness, itching and visual disturbance. Respiratory: Negative for apnea, cough, chest tightness, shortness of breath and wheezing. Cardiovascular: Negative for chest pain, palpitations and leg swelling. Gastrointestinal: Negative for abdominal pain, blood in stool, constipation, diarrhea, nausea and vomiting. Endocrine: Negative. Genitourinary: Negative for decreased urine volume, difficulty urinating, dysuria, frequency, hematuria and urgency. Musculoskeletal: Negative for arthralgias, back pain, gait problem, myalgias and neck pain. Skin: Negative for color change and rash. Allergic/Immunologic: Negative for environmental allergies and food allergies. Neurological: Negative for dizziness, weakness, light-headedness, numbness and headaches. Hematological: Negative for adenopathy. Does not bruise/bleed easily. Psychiatric/Behavioral: Negative for behavioral problems, dysphoric mood and sleep disturbance. The patient is not nervous/anxious and is not hyperactive. All other systems reviewed and are negative.         /62   Pulse 64 Temp 98 °F (36.7 °C)   Wt 173 lb (78.5 kg)   SpO2 98%   BMI 29.70 kg/m²     Physical Exam  Vitals and nursing note reviewed. Constitutional:       General: She is not in acute distress. Appearance: Normal appearance. She is well-developed. HENT:      Head: Normocephalic and atraumatic. Right Ear: Hearing, tympanic membrane and external ear normal. No tenderness. No middle ear effusion. Left Ear: Hearing, tympanic membrane and external ear normal. No tenderness. No middle ear effusion. Nose: Nose normal. No congestion or rhinorrhea. Right Turbinates: Not enlarged. Left Turbinates: Not enlarged. Mouth/Throat:      Mouth: Mucous membranes are moist.      Tongue: No lesions. Pharynx: Oropharynx is clear. No oropharyngeal exudate or posterior oropharyngeal erythema. Eyes:      General: No scleral icterus. Conjunctiva/sclera: Conjunctivae normal.      Pupils: Pupils are equal, round, and reactive to light. Neck:      Thyroid: No thyromegaly. Cardiovascular:      Rate and Rhythm: Normal rate and regular rhythm. Heart sounds: Normal heart sounds. No murmur heard. Pulmonary:      Effort: Pulmonary effort is normal. No respiratory distress. Breath sounds: Normal breath sounds. No wheezing or rales. Abdominal:      General: Bowel sounds are normal. There is no distension. Palpations: Abdomen is soft. Tenderness: There is no abdominal tenderness. Musculoskeletal:         General: No tenderness. Normal range of motion. Cervical back: Normal range of motion and neck supple. No rigidity. No muscular tenderness. Lymphadenopathy:      Cervical: No cervical adenopathy. Skin:     General: Skin is warm and dry. Findings: No erythema or rash. Neurological:      General: No focal deficit present. Mental Status: She is alert and oriented to person, place, and time. Cranial Nerves: No cranial nerve deficit.       Deep Tendon Reflexes: Reflexes are normal and symmetric. Reflexes normal.   Psychiatric:         Mood and Affect: Mood normal.                                 ASSESSMENT/PLAN:    Patient Active Problem List   Diagnosis    Fatigue    Acquired hypothyroidism    Vitamin D deficiency    Gastroesophageal reflux disease without esophagitis    S/P tonsillectomy    Myalgia    Anxiety    Breast mass, left    Seasonal allergies    Mixed hyperlipidemia    Class 1 obesity due to excess calories without serious comorbidity with body mass index (BMI) of 30.0 to 30.9 in adult    Hypothyroidism due to Hashimoto's thyroiditis       Garret was seen today for annual exam, follow-up, fatigue and hypothyroidism. Diagnoses and all orders for this visit:    Encounter for general adult medical examination with abnormal findings  -     Comprehensive Metabolic Panel; Future  -     Lipid Panel; Future    Acquired hypothyroidism  -     TSH; Future    Hypothyroidism due to Hashimoto's thyroiditis  -     TSH; Future  -     T4, Free; Future    Fatigue, unspecified type  -     CBC; Future  -     Comprehensive Metabolic Panel; Future  -     TSH; Future  -     Vitamin B12 & Folate; Future  -     T4, Free; Future    Vitamin D deficiency  -     Vitamin D 25 Hydroxy; Future    Pernicious anemia  -     Vitamin B12 & Folate; Future    Prediabetes  -     Hemoglobin A1C; Future    Other orders  -     citalopram (CELEXA) 40 MG tablet; TAKE 1 TABLET DAILY          Return in about 1 year (around 7/12/2023) for Annual Exam.      I spent 30 minutes with this patient. I spent greater than 50% of the time counseling this patient.         Rachelle Calles DO  7/12/2022  10:37 AM

## 2022-07-13 ENCOUNTER — TELEPHONE (OUTPATIENT)
Dept: ENDOCRINOLOGY | Age: 32
End: 2022-07-13

## 2022-07-13 LAB
FOLATE: >20 NG/ML (ref 4.8–24.2)
T4 FREE: 1.17 NG/DL (ref 0.93–1.7)
VITAMIN B-12: 964 PG/ML (ref 211–946)

## 2022-07-23 ENCOUNTER — PATIENT MESSAGE (OUTPATIENT)
Dept: PRIMARY CARE CLINIC | Age: 32
End: 2022-07-23

## 2022-07-23 DIAGNOSIS — J01.90 ACUTE BACTERIAL SINUSITIS: Primary | ICD-10-CM

## 2022-07-23 DIAGNOSIS — B96.89 ACUTE BACTERIAL SINUSITIS: Primary | ICD-10-CM

## 2022-07-25 RX ORDER — AZITHROMYCIN 250 MG/1
250 TABLET, FILM COATED ORAL SEE ADMIN INSTRUCTIONS
Qty: 6 TABLET | Refills: 0 | Status: SHIPPED | OUTPATIENT
Start: 2022-07-25 | End: 2022-07-30

## 2022-07-25 NOTE — TELEPHONE ENCOUNTER
From: Consuelo Rondon  To: Dr. Landin Sulma: 7/23/2022 11:41 AM EDT  Subject: Endoscopy     Hey Doctor,  Thursday I had an endoscopy [facility] [place]. Well yesterday evening I started feeling sick like Im getting a chest cold and feeling fatigue. Im now coughing and have green mucus. Do you think this is related? Should I be put on a z pack just to be safe? Anyway thank you !

## 2022-08-25 LAB — VARICELLA-ZOSTER VIRUS AB, IGG: NORMAL

## 2022-09-13 RX ORDER — VALACYCLOVIR HYDROCHLORIDE 1 G/1
1000 TABLET, FILM COATED ORAL 2 TIMES DAILY
Qty: 20 TABLET | Refills: 1 | Status: SHIPPED | OUTPATIENT
Start: 2022-09-13

## 2022-10-27 ENCOUNTER — PATIENT MESSAGE (OUTPATIENT)
Dept: PRIMARY CARE CLINIC | Age: 32
End: 2022-10-27

## 2022-10-27 RX ORDER — PREDNISONE 10 MG/1
TABLET ORAL
Qty: 18 TABLET | Refills: 0 | Status: SHIPPED
Start: 2022-10-27 | End: 2022-11-25

## 2022-10-27 RX ORDER — TIZANIDINE 4 MG/1
4 TABLET ORAL NIGHTLY PRN
Qty: 10 TABLET | Refills: 0 | Status: SHIPPED | OUTPATIENT
Start: 2022-10-27

## 2022-10-27 NOTE — TELEPHONE ENCOUNTER
From: Linda Rondon  To: Dr. Nguyen Carolina: 10/27/2022 3:32 PM EDT  Subject: Outreach     Hey Doctor Chris Lazo,    In my left lower back I pulled a muscle some years ago and last night I bent down to pick something up and I felt a pop in my left low back and it feels like a lightning bolt jolting me every time I move. Any recommendations? ? Thank you

## 2022-11-17 ENCOUNTER — TELEPHONE (OUTPATIENT)
Dept: BREAST CENTER | Age: 32
End: 2022-11-17

## 2022-11-17 DIAGNOSIS — N63.10 MASS OF RIGHT BREAST, UNSPECIFIED QUADRANT: Primary | ICD-10-CM

## 2022-11-17 NOTE — TELEPHONE ENCOUNTER
LOUIE Lares received a call from patient wanting to schedule an appointment. RN scheduled pt for 12/6/2022 @ 1pm with YARIEL Glover. Patient to have mammogram and ultrasound prior @ 12pm. Patient verbalized appointment date, time and location. RN verified number that was good to do reminder call on was the one listed in chart. RN advised patient where to park and enter in the building for the appointment.         Electronically signed by Kain Whaley RN on 11/17/22 at 9:17 AM EST

## 2022-11-25 ENCOUNTER — OFFICE VISIT (OUTPATIENT)
Dept: FAMILY MEDICINE CLINIC | Age: 32
End: 2022-11-25
Payer: COMMERCIAL

## 2022-11-25 VITALS
TEMPERATURE: 98.9 F | BODY MASS INDEX: 29.71 KG/M2 | HEART RATE: 88 BPM | DIASTOLIC BLOOD PRESSURE: 84 MMHG | HEIGHT: 64 IN | OXYGEN SATURATION: 98 % | RESPIRATION RATE: 18 BRPM | SYSTOLIC BLOOD PRESSURE: 126 MMHG | WEIGHT: 174 LBS

## 2022-11-25 DIAGNOSIS — R05.9 COUGH, UNSPECIFIED TYPE: ICD-10-CM

## 2022-11-25 DIAGNOSIS — J06.9 UPPER RESPIRATORY INFECTION WITH COUGH AND CONGESTION: Primary | ICD-10-CM

## 2022-11-25 LAB
INFLUENZA A ANTIBODY: NORMAL
INFLUENZA B ANTIBODY: NORMAL
Lab: NORMAL
QC PASS/FAIL: NORMAL
SARS-COV-2 RDRP RESP QL NAA+PROBE: NEGATIVE

## 2022-11-25 PROCEDURE — 87804 INFLUENZA ASSAY W/OPTIC: CPT | Performed by: NURSE PRACTITIONER

## 2022-11-25 PROCEDURE — 87635 SARS-COV-2 COVID-19 AMP PRB: CPT | Performed by: NURSE PRACTITIONER

## 2022-11-25 PROCEDURE — 99214 OFFICE O/P EST MOD 30 MIN: CPT | Performed by: NURSE PRACTITIONER

## 2022-11-25 RX ORDER — PREDNISONE 10 MG/1
TABLET ORAL
Qty: 18 TABLET | Refills: 0 | Status: SHIPPED | OUTPATIENT
Start: 2022-11-25 | End: 2022-12-04

## 2022-11-25 RX ORDER — DOXYCYCLINE HYCLATE 100 MG/1
100 CAPSULE ORAL 2 TIMES DAILY
Qty: 20 CAPSULE | Refills: 0 | Status: SHIPPED | OUTPATIENT
Start: 2022-11-25 | End: 2022-12-05

## 2022-11-25 NOTE — PROGRESS NOTES
Chief Complaint:   Cough (For last 2 days. ), Congestion (In chest. ), and Pharyngitis    History of Present Illness   Source of history provided by:  patient. Gelacio Ortiz is a 29 y.o. old female who presents to walk-in for chest congestion, which began several day(s) prior to arrival. The symptoms are associated with productive cough, pharyngitis, chills, body aches and wheezing. There has been NO fever, N/V/D, chest pain or SOB. Reports hx of asthma. Has been taking Theraflu without much relief. Review of Systems   Unless otherwise stated in this report or unable to obtain because of the patient's clinical or mental status as evidenced by the medical record, this patients's positive and negative responses for Review of Systems, constitutional, psych, eyes, ENT, cardiovascular, respiratory, gastrointestinal, neurological, genitourinary, musculoskeletal, integument systems and systems related to the presenting problem are either stated in the preceding or were not pertinent or were negative for the symptoms and/or complaints related to the medical problem. Past Medical History:  has a past medical history of Anxiety, Asthma, Class 1 obesity due to excess calories without serious comorbidity with body mass index (BMI) of 30.0 to 30.9 in adult, Enlarged lymph node, Environmental allergies, Hypothyroidism due to Hashimoto's thyroiditis, IBS (irritable bowel syndrome), and Mitral valve prolapse. Past Surgical History:  has a past surgical history that includes Dental surgery; Colonoscopy (18);  section; pr removal of tonsils,14+ y/o (N/A, 2018); pr biopsy oropharynx (Left, 2018); and Tonsillectomy (N/A, 2018). Social History:  reports that she has never smoked. She has never used smokeless tobacco. She reports current alcohol use. She reports that she does not use drugs. Family History: family history includes Cancer (age of onset: 46) in her paternal aunt;  Heart Disease in her father; High Blood Pressure in her mother; Hypertension in her father and mother; Kidney Disease in her father; Other in her mother and sister. Allergies: Cefdinir, Lomedia 24 fe [norethin ace-eth estrad-fe], Nutritional supplements, and Sulfa antibiotics    Physical Exam   Vital Signs:  /84   Pulse 88   Temp 98.9 °F (37.2 °C)   Resp 18   Ht 5' 4\" (1.626 m)   Wt 174 lb (78.9 kg)   SpO2 98%   BMI 29.87 kg/m²    Oxygen Saturation Interpretation: Normal.    Constitutional:  Alert, development consistent with age. Ears: Bilateral pinna normal. TMs clear without erythema or perforation bilaterally. Canals normal bilaterally without swelling or exudate  Nose:  There is mild congestion of the nasal mucosa. There is mild injection to middle turbinates bilaterally. Throat: There is mild posterior pharyngeal erythema with mild post nasal drip present. No exudate or tonsillar hypertrophy noted. Neck:  Supple. There is no anterior cervical adenopathy. Lungs: CTAB without wheezes, rales, or rhonchi  Heart:  Regular rate and rhythm, normal heart sounds, without pathological murmurs, ectopy, gallops, or rubs. Skin:  Normal turgor. Warm, dry, without visible rash. Neurological:  Alert and oriented. Motor functions intact. Responds to verbal commands. Test Results Section   (All laboratory and radiology results have been personally reviewed by myself)  Labs:  Results for orders placed or performed in visit on 11/25/22   POCT Influenza A/B   Result Value Ref Range    Influenza A Ab neg     Influenza B Ab neg    POCT COVID-19 Rapid, NAAT   Result Value Ref Range    SARS-COV-2, RdRp gene Negative Negative    Lot Number [id number]     QC Pass/Fail pass      Imaging:  XR CHEST (2 VW)    Result Date: 11/25/2022  EXAMINATION: TWO XRAY VIEWS OF THE CHEST 11/25/2022 1:41 pm COMPARISON: None.  HISTORY: ORDERING SYSTEM PROVIDED HISTORY: Cough, unspecified type TECHNOLOGIST PROVIDED HISTORY: Reason for exam:->r/o pneumonia FINDINGS: The lungs are without acute focal process. There is no effusion or pneumothorax. The cardiomediastinal silhouette is without acute process. The osseous structures are without acute process. No acute process. Assessment / Plan   Impression(s):  Garret was seen today for cough, congestion and pharyngitis. Diagnoses and all orders for this visit:    Upper respiratory infection with cough and congestion  -     predniSONE (DELTASONE) 10 MG tablet; Take 3 tablets by mouth daily for 3 days, THEN 2 tablets daily for 3 days, THEN 1 tablet daily for 3 days. -     doxycycline hyclate (VIBRAMYCIN) 100 MG capsule; Take 1 capsule by mouth 2 times daily for 10 days    Cough, unspecified type  -     POCT Influenza A/B  -     POCT COVID-19 Rapid, NAAT  -     XR CHEST (2 VW); Future    Rapid flu and COVID negative, pt advised of results. Chest x-ray in office obtained, I personally reviewed the x-ray and did not see any signs of pneumonia which was confirmed by radiology. Increase fluids and rest. Script written for Doxycycline and prednisone, side effects discussed. Additional symptomatic relief discussed. PCP in 5-7 days if symptoms persist. ED sooner if symptoms worsen or change. Red flag symptoms discussed. Pt is in agreement with this care plan. All questions answered. Return if symptoms worsen or fail to improve. Electronically signed by GATITO Billingsley CNP   DD: 11/25/22    **This report was transcribed using voice recognition software. Every effort was made to ensure accuracy; however, inadvertent computerized transcription errors may be present.

## 2022-11-28 ASSESSMENT — ENCOUNTER SYMPTOMS
VOICE CHANGE: 0
CHEST TIGHTNESS: 0
VOMITING: 0
BLOOD IN STOOL: 0
SORE THROAT: 0
NAUSEA: 0
TROUBLE SWALLOWING: 0
BACK PAIN: 0
EYE ITCHING: 0
SINUS PAIN: 0
CHOKING: 0
RHINORRHEA: 0
EYE DISCHARGE: 0
WHEEZING: 0
DIARRHEA: 0
CONSTIPATION: 0
SINUS PRESSURE: 0
SHORTNESS OF BREATH: 0
ABDOMINAL DISTENTION: 0
COUGH: 0
ABDOMINAL PAIN: 0

## 2022-11-28 NOTE — PROGRESS NOTES
Summary: This note was copied forward from the last encounter. Essential components for this patient record were reviewed and verified on this visit including:  recent hospitalizations, recent imaging, PMH, PSH, FH, SOC HX, Allergies, and Medications were reviewed and updated as appropriate. In addition, the assessment and plan were copied from prior office note and updated accordingly. Patient ID: Jose Whaley is a 29 y.o. female. HPI   PCP: Haresh Cuevas DO. Gynecologist: Dr. Trav Bolanos is a 29 y.o. extremely pleasant young woman who presents for follow up with complaints of bilateral breast discomfort, Right worse than left. Mostly related to her menses, but there is a focal area of tenderness of the right breast at the 11 o'clock position, 3 cm from the nipple. She also reports bilateral milky nipple discharge just immediately prior to her menstrual cycle. It will resolve spontaneously. She has a history of biopsy-proven fibroadenoma of the left breast.    Risks for breast cancer include Ashkenazi Buddhism ancestry (2% per 23 and me testing), paternal Linda Yan aunt in her 52's, Maternal great aunt with breast cancer, and a Maternal first cousin with breast cancer age 45. Breast imaging to date @ [address] [facility] [place][note 2] IBS lifetime risk 16.4%. Type III breast density. 03/14/2018 bilateral breast ultrasound: Oval solid mass left breast measuring 1.3 x 6 x 8 mm,  8:00 position and 3 cm from the nipple most consistent with fibroadenoma. Contains biopsy clip.   Benign, BI-RADS 2.  09/24/2018 right breast limited ultrasound: Negative, BI-RADS 1  09/24/2018 left breast Limited ultrasound: Stable solid mass in the left breast is benign, BI-RADS 2.  01/20/2020 Limited left breast ultrasound: Negative, BI-RADS 1.  10/27/2020 Limited right breast ultrasound: Negative, BI-RADS 1.  04/30/2021 Limited left breast ultrasound negative, BI-RADS 1.  04/30/2021 Limited right breast ultrasound: Negative, BI-RADS 1.  12/06/2022 bilateral diagnostic mammogram Negative, BI-RADS 1.  12/06/2022 right breast ultrasound Negative, BI-RADS, 1.    Breast biopsy  2011 @ [facility] hospital (Benign). Menarche age 8. G2, P2. First live birth age 23. Review of Systems   Constitutional:  Negative for activity change, appetite change, chills, fatigue, fever and unexpected weight change. Here for c/o bilateral breast discomfort, right greater than left. Reports it is mostly cyclic, but the right breast discomfort is pronounced. HENT:  Negative for congestion, postnasal drip, rhinorrhea, sinus pressure, sinus pain, sore throat, trouble swallowing and voice change. Eyes:  Negative for discharge, itching and visual disturbance. Respiratory:  Negative for cough, choking, chest tightness, shortness of breath and wheezing. Cardiovascular:  Negative for chest pain, palpitations and leg swelling. Gastrointestinal:  Negative for abdominal distention, abdominal pain, blood in stool, constipation, diarrhea, nausea and vomiting. Endocrine: Negative for cold intolerance and heat intolerance. Genitourinary:  Negative for difficulty urinating, dysuria, frequency and hematuria. Musculoskeletal:  Negative for arthralgias, back pain, gait problem, joint swelling, myalgias, neck pain and neck stiffness. Allergic/Immunologic: Negative for environmental allergies and food allergies. Neurological:  Negative for dizziness, seizures, syncope, speech difficulty, weakness, light-headedness and headaches. Hematological:  Negative for adenopathy. Does not bruise/bleed easily. Psychiatric/Behavioral:  Negative for agitation, confusion and decreased concentration. The patient is not nervous/anxious. Objective:   Physical Exam  Vitals and nursing note reviewed. Constitutional:       General: She is not in acute distress. Appearance: She is well-developed. She is not diaphoretic. Comments: ECOG remains stable at 0; pleasant and conversant. No apparent distress. HENT:      Head: Normocephalic and atraumatic. Mouth/Throat:      Pharynx: No oropharyngeal exudate. Eyes:      General: No scleral icterus. Right eye: No discharge. Left eye: No discharge. Conjunctiva/sclera: Conjunctivae normal.   Neck:      Thyroid: No thyromegaly. Vascular: No JVD. Trachea: No tracheal deviation. Cardiovascular:      Rate and Rhythm: Normal rate and regular rhythm. Heart sounds: No murmur heard. No friction rub. No gallop. Pulmonary:      Effort: Pulmonary effort is normal. No respiratory distress or retractions. Breath sounds: Normal breath sounds. No stridor. No wheezing or rales. Chest:      Chest wall: No mass, lacerations, deformity, swelling, tenderness or edema. Breasts:     Breasts are symmetrical.      Right: Mass present. No inverted nipple, nipple discharge, skin change or tenderness. Left: No inverted nipple, mass, nipple discharge, skin change or tenderness. Comments: Breasts are with age-appropriate density bilaterally. Clinically, her exam remains overall stable. No evidence of malignancy. No nipple discharge appreciated on this exam.  Abdominal:      General: There is no distension. Palpations: Abdomen is soft. Tenderness: There is no abdominal tenderness. There is no guarding or rebound. Musculoskeletal:         General: No tenderness or deformity. Normal range of motion. Right shoulder: Normal.      Left shoulder: Normal.      Cervical back: Normal range of motion and neck supple. Lymphadenopathy:      Cervical: No cervical adenopathy. Right cervical: No superficial, deep or posterior cervical adenopathy. Left cervical: No superficial, deep or posterior cervical adenopathy. Upper Body:      Right upper body: No pectoral adenopathy. Left upper body: No pectoral adenopathy.    Skin: General: Skin is warm and dry. Coloration: Skin is not pale. Findings: No erythema or rash. Neurological:      Mental Status: She is alert and oriented to person, place, and time. Coordination: Coordination normal.   Psychiatric:         Behavior: Behavior normal.         Thought Content: Thought content normal.         Judgment: Judgment normal.      Assessment:    Davis Dean is an extremely pleasant 29 y.o. female of Ashkenazi Hindu ancestry and with FH of breast cancer in paternal great aunt in her early 52's, a maternal great aunt with breast cancer, and more recently, a maternal first cousin with breast cancer at age 45. Paul Gallardo She has a history of US core biopsy proven left breast fibroadenoma done at [facility] in 2011. Breast imaging to date @ 39 [facility] IBS lifetime risk 16.4%. Type III breast density. 03/14/2018 bilateral breast ultrasound: Oval solid mass left breast measuring 1.3 x 6 x 8 mm,  8:00 position and 3 cm from the nipple most consistent with fibroadenoma. Contains biopsy clip. Benign, BI-RADS 2.  09/24/2018 right breast limited ultrasound: Negative, BI-RADS 1  09/24/2018 left breast Limited ultrasound: Stable solid mass in the left breast is benign, BI-RADS 2.  01/20/2020 Limited left breast ultrasound: Negative, BI-RADS 1.  10/27/2020 Limited right breast ultrasound: Negative, BI-RADS 1.  04/30/2021 Limited left breast ultrasound negative, BI-RADS 1.  04/30/2021 Limited right breast ultrasound: Negative, BI-RADS 1.  12/06/2022 bilateral diagnostic mammogram negative, BI-RADS 1.  12/06/2022 right breast ultrasound Negative, BI-RADS 1. We reviewed her breast imaging today for educational (not interpretive) purposes on this visit. We discussed breast anatomy, breast density as assigned by radiology, the bi-rads result, and reviewed the purpose of any biopsy or surgical clips (did not verify placement) noted on imaging.   In addition, we discussed any changes on imaging as they relate to post procedure/post treatment. It was clearly stated to [facility] that interpretation of imaging and the final result of imaging is at the discretion of the reading radiologist.   Clinical follow up is without evidence of malignancy. Reviewed her family history in detail on this visit. She has age-appropriate density of the bilateral breasts. She has persistent but stable multiple, tender nodules superficially located in the upper outer quadrant of the right breast at 11 o'clock position; clinically stable but progressive discomfort. She has a 70-year-old maternal first cousin who was recently diagnosed with breast cancer. In addition, she has discovered that she has Ashkenazi Quaker ancestry based on results from  and me genetic testing. We discussed it would be helpful to get a copy of her maternal first cousins genetic test results. Plan:    Continue monthly breast/chest wall self examination; detailed instructions reviewed today. Bring any changes to your physician's attention. Continue healthy diet and exercise routinely as tolerated. Avoid alcohol or limit to 3 drinks or less per week. Wear a good support bra at all times. Continue follow up with Primary Care/Gynecology and all specialties as directed. She will continue to follow with Gynecology annually.   RTC age 30 and PRN with bilateral screening mammogram     I spent a total of 19 minutes on the date of the service which included preparing to see the patient, face-to-face patient care, completing clinical documentation, obtaining and/or reviewing separately obtained history, performing a medically appropriate examination, counseling and educating the patient/family/caregiver, ordering medications, tests, or procedures, communicating with other HCPs (not separately reported), independently interpreting results (not separately reported), communicating results to the patient/family/caregiver and care coordination (not separately reported). This document is generated, in part, by voice recognition software and thus syntax and grammatical errors are possible. Shona Beck, RN, MSN, APRN-CNP, [address] [place]  Advanced Oncology Certified Nurse Practitioner  Department of Breast Surgery  [facility]/  Care in collaboration with Dr. Calin Oliver.  Juani/Dr. Judy Almendarez/Dr. Newman Lesch APRN-CNP

## 2022-12-06 ENCOUNTER — HOSPITAL ENCOUNTER (OUTPATIENT)
Dept: GENERAL RADIOLOGY | Age: 32
Discharge: HOME | End: 2022-12-08
Payer: COMMERCIAL

## 2022-12-06 ENCOUNTER — OFFICE VISIT (OUTPATIENT)
Dept: BREAST CENTER | Age: 32
End: 2022-12-06
Payer: COMMERCIAL

## 2022-12-06 VITALS
WEIGHT: 165 LBS | OXYGEN SATURATION: 98 % | HEIGHT: 63 IN | TEMPERATURE: 98.8 F | DIASTOLIC BLOOD PRESSURE: 74 MMHG | BODY MASS INDEX: 29.23 KG/M2 | SYSTOLIC BLOOD PRESSURE: 126 MMHG | HEART RATE: 89 BPM | RESPIRATION RATE: 12 BRPM

## 2022-12-06 DIAGNOSIS — N63.0 BREAST LUMP IN FEMALE: Primary | ICD-10-CM

## 2022-12-06 DIAGNOSIS — N63.10 MASS OF RIGHT BREAST, UNSPECIFIED QUADRANT: ICD-10-CM

## 2022-12-06 PROCEDURE — 76642 ULTRASOUND BREAST LIMITED: CPT

## 2022-12-06 PROCEDURE — 99212 OFFICE O/P EST SF 10 MIN: CPT | Performed by: NURSE PRACTITIONER

## 2022-12-06 PROCEDURE — G0279 TOMOSYNTHESIS, MAMMO: HCPCS

## 2022-12-06 PROCEDURE — 99213 OFFICE O/P EST LOW 20 MIN: CPT | Performed by: NURSE PRACTITIONER

## 2022-12-28 ENCOUNTER — TELEPHONE (OUTPATIENT)
Dept: BARIATRICS/WEIGHT MGMT | Age: 32
End: 2022-12-28

## 2023-01-04 RX ORDER — LEVOTHYROXINE SODIUM 0.07 MG/1
TABLET ORAL
Qty: 90 TABLET | Refills: 3 | Status: SHIPPED | OUTPATIENT
Start: 2023-01-04

## 2023-01-09 RX ORDER — LEVOTHYROXINE SODIUM 0.07 MG/1
75 TABLET ORAL DAILY
Qty: 90 TABLET | Refills: 3 | Status: SHIPPED
Start: 2023-01-09 | End: 2023-01-09 | Stop reason: SDUPTHER

## 2023-02-06 ENCOUNTER — OFFICE VISIT (OUTPATIENT)
Dept: PRIMARY CARE CLINIC | Age: 33
End: 2023-02-06
Payer: COMMERCIAL

## 2023-02-06 VITALS
OXYGEN SATURATION: 98 % | RESPIRATION RATE: 16 BRPM | WEIGHT: 162 LBS | SYSTOLIC BLOOD PRESSURE: 120 MMHG | HEIGHT: 63 IN | HEART RATE: 75 BPM | BODY MASS INDEX: 28.7 KG/M2 | DIASTOLIC BLOOD PRESSURE: 68 MMHG

## 2023-02-06 DIAGNOSIS — D51.0 PERNICIOUS ANEMIA: ICD-10-CM

## 2023-02-06 DIAGNOSIS — E61.1 IRON DEFICIENCY: ICD-10-CM

## 2023-02-06 DIAGNOSIS — R53.83 FATIGUE, UNSPECIFIED TYPE: ICD-10-CM

## 2023-02-06 DIAGNOSIS — E03.8 HYPOTHYROIDISM DUE TO HASHIMOTO'S THYROIDITIS: Primary | ICD-10-CM

## 2023-02-06 DIAGNOSIS — E06.3 HYPOTHYROIDISM DUE TO HASHIMOTO'S THYROIDITIS: Primary | ICD-10-CM

## 2023-02-06 DIAGNOSIS — E55.9 VITAMIN D DEFICIENCY: ICD-10-CM

## 2023-02-06 DIAGNOSIS — N39.0 URINARY TRACT INFECTION WITHOUT HEMATURIA, SITE UNSPECIFIED: ICD-10-CM

## 2023-02-06 DIAGNOSIS — E06.3 HYPOTHYROIDISM DUE TO HASHIMOTO'S THYROIDITIS: ICD-10-CM

## 2023-02-06 DIAGNOSIS — E03.8 HYPOTHYROIDISM DUE TO HASHIMOTO'S THYROIDITIS: ICD-10-CM

## 2023-02-06 LAB
ALBUMIN SERPL-MCNC: 4.5 G/DL (ref 3.5–5.2)
ALP BLD-CCNC: 72 U/L (ref 35–104)
ALT SERPL-CCNC: 13 U/L (ref 0–32)
ANION GAP SERPL CALCULATED.3IONS-SCNC: 10 MMOL/L (ref 7–16)
AST SERPL-CCNC: 22 U/L (ref 0–31)
BILIRUB SERPL-MCNC: 0.3 MG/DL (ref 0–1.2)
BILIRUBIN URINE: NEGATIVE
BLOOD, URINE: NEGATIVE
BUN BLDV-MCNC: 11 MG/DL (ref 6–20)
CALCIUM SERPL-MCNC: 8.8 MG/DL (ref 8.6–10.2)
CHLORIDE BLD-SCNC: 104 MMOL/L (ref 98–107)
CHOLESTEROL, TOTAL: 205 MG/DL (ref 0–199)
CLARITY: CLEAR
CO2: 24 MMOL/L (ref 22–29)
COLOR: YELLOW
CREAT SERPL-MCNC: 0.6 MG/DL (ref 0.5–1)
FERRITIN: 99 NG/ML
FOLATE: 13.4 NG/ML (ref 4.8–24.2)
GFR SERPL CREATININE-BSD FRML MDRD: >60 ML/MIN/1.73
GLUCOSE BLD-MCNC: 87 MG/DL (ref 74–99)
GLUCOSE URINE: NEGATIVE MG/DL
HCT VFR BLD CALC: 38.9 % (ref 34–48)
HDLC SERPL-MCNC: 44 MG/DL
HEMOGLOBIN: 12.7 G/DL (ref 11.5–15.5)
IRON SATURATION: 23 % (ref 15–50)
IRON: 79 MCG/DL (ref 37–145)
KETONES, URINE: NEGATIVE MG/DL
LDL CHOLESTEROL CALCULATED: 149 MG/DL (ref 0–99)
LEUKOCYTE ESTERASE, URINE: NEGATIVE
MCH RBC QN AUTO: 28.5 PG (ref 26–35)
MCHC RBC AUTO-ENTMCNC: 32.6 % (ref 32–34.5)
MCV RBC AUTO: 87.2 FL (ref 80–99.9)
NITRITE, URINE: NEGATIVE
PDW BLD-RTO: 12 FL (ref 11.5–15)
PH UA: 5.5 (ref 5–9)
PLATELET # BLD: 227 E9/L (ref 130–450)
PMV BLD AUTO: 11 FL (ref 7–12)
POTASSIUM SERPL-SCNC: 4.6 MMOL/L (ref 3.5–5)
PROTEIN UA: NEGATIVE MG/DL
RBC # BLD: 4.46 E12/L (ref 3.5–5.5)
SODIUM BLD-SCNC: 138 MMOL/L (ref 132–146)
SPECIFIC GRAVITY UA: 1.02 (ref 1–1.03)
T4 FREE: 1.41 NG/DL (ref 0.93–1.7)
TOTAL IRON BINDING CAPACITY: 346 MCG/DL (ref 250–450)
TOTAL PROTEIN: 7.8 G/DL (ref 6.4–8.3)
TRIGL SERPL-MCNC: 58 MG/DL (ref 0–149)
TSH SERPL DL<=0.05 MIU/L-ACNC: 0.68 UIU/ML (ref 0.27–4.2)
UROBILINOGEN, URINE: 0.2 E.U./DL
VITAMIN B-12: 783 PG/ML (ref 211–946)
VITAMIN D 25-HYDROXY: 43 NG/ML (ref 30–100)
VLDLC SERPL CALC-MCNC: 12 MG/DL
WBC # BLD: 5.7 E9/L (ref 4.5–11.5)

## 2023-02-06 PROCEDURE — 99214 OFFICE O/P EST MOD 30 MIN: CPT | Performed by: FAMILY MEDICINE

## 2023-02-06 ASSESSMENT — PATIENT HEALTH QUESTIONNAIRE - PHQ9
2. FEELING DOWN, DEPRESSED OR HOPELESS: 0
SUM OF ALL RESPONSES TO PHQ QUESTIONS 1-9: 0
SUM OF ALL RESPONSES TO PHQ QUESTIONS 1-9: 0
1. LITTLE INTEREST OR PLEASURE IN DOING THINGS: 0
SUM OF ALL RESPONSES TO PHQ QUESTIONS 1-9: 0
SUM OF ALL RESPONSES TO PHQ QUESTIONS 1-9: 0
SUM OF ALL RESPONSES TO PHQ9 QUESTIONS 1 & 2: 0

## 2023-02-06 ASSESSMENT — ENCOUNTER SYMPTOMS
ABDOMINAL PAIN: 0
VOMITING: 0
BACK PAIN: 0
DIARRHEA: 0
CHEST TIGHTNESS: 0
RHINORRHEA: 0
NAUSEA: 0
COLOR CHANGE: 0
COUGH: 0
VISUAL CHANGE: 0
HAIR LOSS: 0
CONSTIPATION: 0
EYE ITCHING: 0
APNEA: 0
SINUS PRESSURE: 1
EYE REDNESS: 0
SORE THROAT: 0
BLOOD IN STOOL: 0
SINUS COMPLAINT: 1
EYE PAIN: 0
WHEEZING: 0
SHORTNESS OF BREATH: 0

## 2023-02-06 NOTE — PROGRESS NOTES
Chief Complaint:     Chief Complaint   Patient presents with    Hypothyroidism    Fatigue         Fatigue  This is a recurrent problem. The current episode started 1 to 4 weeks ago. The problem occurs daily. The problem has been unchanged. Associated symptoms include fatigue. Pertinent negatives include no abdominal pain, arthralgias, chest pain, congestion, coughing, fever, headaches, myalgias, nausea, neck pain, numbness, rash, sore throat, visual change, vomiting or weakness. Nothing aggravates the symptoms. She has tried nothing for the symptoms. The treatment provided no relief. Sinus Problem  This is a new problem. The current episode started in the past 7 days. The problem is unchanged. There has been no fever. The pain is mild. Associated symptoms include sinus pressure. Pertinent negatives include no congestion, coughing, ear pain, headaches, neck pain, shortness of breath or sore throat. Past treatments include nothing. The treatment provided no relief. Other  This is a recurrent (Vit D def) problem. The current episode started more than 1 month ago. The problem occurs intermittently. The problem has been waxing and waning. Associated symptoms include fatigue. Pertinent negatives include no abdominal pain, arthralgias, chest pain, congestion, coughing, fever, headaches, myalgias, nausea, neck pain, numbness, rash, sore throat, visual change, vomiting or weakness. Nothing aggravates the symptoms. She has tried nothing for the symptoms. The treatment provided no relief. Thyroid Problem  Presents for follow-up visit. Symptoms include fatigue and weight loss. Patient reports no anxiety, constipation, diarrhea, hair loss, heat intolerance, menstrual problem, nail problem, palpitations, tremors, visual change or weight gain. Urinary Tract Infection  This is a new problem. The current episode started in the past 7 days. The problem is unchanged. Associated symptoms include pain.  Pertinent negatives include no hematuria. The pain is present in the suprapubic region and bladder. Patient Active Problem List   Diagnosis    Fatigue    Acquired hypothyroidism    Vitamin D deficiency    Gastroesophageal reflux disease without esophagitis    S/P tonsillectomy    Myalgia    Anxiety    Breast mass, left    Seasonal allergies    Mixed hyperlipidemia    Class 1 obesity due to excess calories without serious comorbidity with body mass index (BMI) of 30.0 to 30.9 in adult    Hypothyroidism due to Hashimoto's thyroiditis       Past Medical History:   Diagnosis Date    Anxiety     Asthma     excersise induced and allergy induced    Class 1 obesity due to excess calories without serious comorbidity with body mass index (BMI) of 30.0 to 30.9 in adult     Enlarged lymph node     Bx 2018, neg    Environmental allergies     Hypothyroidism due to Hashimoto's thyroiditis     IBS (irritable bowel syndrome)     Mitral valve prolapse     no regurgitation, with anxiety or caffeine developes palpitations,followed with Dr. Rock Peter, has appt with Dr. Anjelica Phipps 2018       Past Surgical History:   Procedure Laterality Date     SECTION      2 children     COLONOSCOPY  18    DENTAL SURGERY      extractions    IN BIOPSY OROPHARYNX Left 2018    LEFT TONGUE  BIOPSY performed by Rylie Taylor MD at [address] [place] [address] PRIMARY/SECONDARY AGE 14/> N/A 2018    TONSILLECTOMY performed by Rylie Taylor MD at [place] [address] N/A 2018    TONSILLECTOMY POSTOP HEMORRHAGE CONTROL performed by Rylie Taylor MD at [facility] OR       Current Outpatient Medications   Medication Sig Dispense Refill    phentermine (ADIPEX-P) 37.5 MG tablet Take 1 tablet by mouth every morning (before breakfast) for 30 days.  Max Daily Amount: 37.5 mg 30 tablet 0    levothyroxine (SYNTHROID) 75 MCG tablet Take 1 tablet by mouth Daily 14 tablet 0    valACYclovir (VALTREX) 1 g tablet Take 1 tablet by mouth 2 times daily 20 tablet 1    citalopram (CELEXA) 40 MG tablet TAKE 1 TABLET DAILY 90 tablet 3    montelukast (SINGULAIR) 10 MG tablet Take 1 tablet by mouth daily 30 tablet 0    albuterol sulfate  (90 Base) MCG/ACT inhaler Inhale 2 puffs into the lungs 4 times daily as needed for Wheezing 1 Inhaler 0    Cholecalciferol 100 MCG (4000 UT) CAPS Take by mouth      Ascorbic Acid (VITAMIN C) 1000 MG tablet Take 1,000 mg by mouth daily      loratadine (CLARITIN) 10 MG tablet Take 10 mg by mouth daily      clonazePAM (KLONOPIN) 0.5 MG tablet take 1 tablet by mouth twice a day if needed for anxiety 60 tablet 0     No current facility-administered medications for this visit.        Allergies   Allergen Reactions    Cefdinir Hives     Had reaction as a child  Other reaction(s): AOF    Lomedia 24 Fe [Norethin Ace-Eth Estrad-Fe] Hives    Nutritional Supplements      Other reaction(s): AOF    Sulfa Antibiotics Hives     Joint swell       Social History     Socioeconomic History    Marital status:      Spouse name: None    Number of children: None    Years of education: None    Highest education level: None   Occupational History     Employer: [employer]     Employer: [employer]   Tobacco Use    Smoking status: Never    Smokeless tobacco: Never   Vaping Use    Vaping Use: Never used   Substance and Sexual Activity    Alcohol use: Yes     Comment: wine once in a while    Drug use: No     Social Determinants of Health     Financial Resource Strain: Unknown    Difficulty of Paying Living Expenses: Patient refused   Food Insecurity: Unknown    Worried About Running Out of Food in the Last Year: Patient refused    Ran Out of Food in the Last Year: Patient refused       Family History   Problem Relation Age of Onset    Hypertension Mother         pulmonary hypertension     High Blood Pressure Mother     Other Mother     Heart Disease Father     Kidney Disease Father     Hypertension Father         IBS & polyps Other Sister         gastric ulcer & IBS    Cancer Paternal Aunt 46        breast    Cancer Maternal Cousin 45        breast          Review of Systems   Constitutional:  Positive for fatigue and weight loss. Negative for activity change, appetite change, fever and weight gain. HENT:  Positive for sinus pressure. Negative for congestion, ear pain, hearing loss, nosebleeds, rhinorrhea and sore throat. Eyes:  Negative for pain, redness, itching and visual disturbance. Respiratory:  Negative for apnea, cough, chest tightness, shortness of breath and wheezing. Cardiovascular:  Negative for chest pain, palpitations and leg swelling. Gastrointestinal:  Negative for abdominal pain, blood in stool, constipation, diarrhea, nausea and vomiting. Endocrine: Negative. Negative for heat intolerance. Genitourinary:  Negative for decreased urine volume, difficulty urinating, dysuria, frequency, hematuria, menstrual problem and urgency. Musculoskeletal:  Negative for arthralgias, back pain, gait problem, myalgias and neck pain. Skin:  Negative for color change and rash. Allergic/Immunologic: Negative for environmental allergies and food allergies. Neurological:  Negative for dizziness, tremors, weakness, light-headedness, numbness and headaches. Hematological:  Negative for adenopathy. Does not bruise/bleed easily. Psychiatric/Behavioral:  Negative for behavioral problems, dysphoric mood and sleep disturbance. The patient is not nervous/anxious and is not hyperactive. All other systems reviewed and are negative. /68   Pulse 75   Resp 16   Ht 5' 3\" (1.6 m)   Wt 162 lb (73.5 kg)   SpO2 98%   BMI 28.70 kg/m²     Physical Exam  Vitals and nursing note reviewed. Constitutional:       General: She is not in acute distress. Appearance: Normal appearance. She is well-developed. HENT:      Head: Normocephalic and atraumatic.       Right Ear: Hearing, tympanic membrane and external ear normal. No tenderness. No middle ear effusion. Left Ear: Hearing, tympanic membrane and external ear normal. No tenderness. No middle ear effusion. Nose: Nose normal. No congestion or rhinorrhea. Right Turbinates: Not enlarged. Left Turbinates: Not enlarged. Mouth/Throat:      Mouth: Mucous membranes are moist.      Tongue: No lesions. Pharynx: Oropharynx is clear. No oropharyngeal exudate or posterior oropharyngeal erythema. Eyes:      General: No scleral icterus. Conjunctiva/sclera: Conjunctivae normal.      Pupils: Pupils are equal, round, and reactive to light. Neck:      Thyroid: No thyromegaly. Cardiovascular:      Rate and Rhythm: Normal rate and regular rhythm. Heart sounds: Normal heart sounds. No murmur heard. Pulmonary:      Effort: Pulmonary effort is normal. No respiratory distress. Breath sounds: Normal breath sounds. No wheezing or rales. Abdominal:      General: Bowel sounds are normal. There is no distension. Palpations: Abdomen is soft. Tenderness: There is no abdominal tenderness. Musculoskeletal:         General: No tenderness. Normal range of motion. Cervical back: Normal range of motion and neck supple. No rigidity. No muscular tenderness. Lymphadenopathy:      Cervical: No cervical adenopathy. Skin:     General: Skin is warm and dry. Findings: No erythema or rash. Neurological:      General: No focal deficit present. Mental Status: She is alert and oriented to person, place, and time. Cranial Nerves: No cranial nerve deficit. Deep Tendon Reflexes: Reflexes are normal and symmetric.  Reflexes normal.   Psychiatric:         Mood and Affect: Mood normal.                               ASSESSMENT/PLAN:    Patient Active Problem List   Diagnosis    Fatigue    Acquired hypothyroidism    Vitamin D deficiency    Gastroesophageal reflux disease without esophagitis    S/P tonsillectomy    Myalgia Anxiety    Breast mass, left    Seasonal allergies    Mixed hyperlipidemia    Class 1 obesity due to excess calories without serious comorbidity with body mass index (BMI) of 30.0 to 30.9 in adult    Hypothyroidism due to Hashimoto's thyroiditis       Garret was seen today for hypothyroidism and fatigue. Diagnoses and all orders for this visit:    Hypothyroidism due to Hashimoto's thyroiditis  -     CBC; Future  -     Comprehensive Metabolic Panel; Future  -     TSH; Future  -     Lipid Panel; Future  -     T4, Free; Future    Fatigue, unspecified type  -     CBC; Future  -     Comprehensive Metabolic Panel; Future  -     TSH; Future    Vitamin D deficiency  -     Vitamin D 25 Hydroxy; Future    Pernicious anemia  -     Vitamin B12 & Folate; Future    Iron deficiency  -     Ferritin; Future  -     Iron and TIBC; Future    Urinary tract infection without hematuria, site unspecified  -     Urinalysis; Future  -     Culture, Urine; Future        Return if symptoms worsen or fail to improve. I spent 30 minutes with this patient. I spent greater than 50% of the time counseling this patient.         Quintin Meraz DO  2/6/2023  9:56 AM

## 2023-02-08 ENCOUNTER — TELEPHONE (OUTPATIENT)
Dept: PRIMARY CARE CLINIC | Age: 33
End: 2023-02-08

## 2023-02-08 LAB — URINE CULTURE, ROUTINE: NORMAL

## 2023-02-08 RX ORDER — PHENAZOPYRIDINE HYDROCHLORIDE 200 MG/1
200 TABLET, FILM COATED ORAL 3 TIMES DAILY PRN
Qty: 9 TABLET | Refills: 0 | Status: SHIPPED | OUTPATIENT
Start: 2023-02-08 | End: 2023-02-11

## 2023-02-08 NOTE — TELEPHONE ENCOUNTER
Pt calling for urine culture results. She states she is still having dysuria.  She states she was not given wipes to do a clean catch urine

## 2023-03-30 DIAGNOSIS — E61.1 IRON DEFICIENCY: ICD-10-CM

## 2023-03-30 DIAGNOSIS — D51.0 PERNICIOUS ANEMIA: ICD-10-CM

## 2023-03-30 LAB
BASOPHILS # BLD: 0.05 E9/L (ref 0–0.2)
BASOPHILS NFR BLD: 0.8 % (ref 0–2)
EOSINOPHIL # BLD: 0.22 E9/L (ref 0.05–0.5)
EOSINOPHIL NFR BLD: 3.4 % (ref 0–6)
ERYTHROCYTE [DISTWIDTH] IN BLOOD BY AUTOMATED COUNT: 11.8 FL (ref 11.5–15)
HCT VFR BLD AUTO: 40 % (ref 34–48)
HGB BLD-MCNC: 13 G/DL (ref 11.5–15.5)
IMM GRANULOCYTES # BLD: 0.02 E9/L
IMM GRANULOCYTES NFR BLD: 0.3 % (ref 0–5)
LYMPHOCYTES # BLD: 1.87 E9/L (ref 1.5–4)
LYMPHOCYTES NFR BLD: 29.2 % (ref 20–42)
MCH RBC QN AUTO: 29.5 PG (ref 26–35)
MCHC RBC AUTO-ENTMCNC: 32.5 % (ref 32–34.5)
MCV RBC AUTO: 90.7 FL (ref 80–99.9)
MONOCYTES # BLD: 0.61 E9/L (ref 0.1–0.95)
MONOCYTES NFR BLD: 9.5 % (ref 2–12)
NEUTROPHILS # BLD: 3.64 E9/L (ref 1.8–7.3)
NEUTS SEG NFR BLD: 56.8 % (ref 43–80)
PLATELET # BLD AUTO: 206 E9/L (ref 130–450)
PMV BLD AUTO: 11.5 FL (ref 7–12)
RBC # BLD AUTO: 4.41 E12/L (ref 3.5–5.5)
WBC # BLD: 6.4 E9/L (ref 4.5–11.5)

## 2023-05-26 ENCOUNTER — OFFICE VISIT (OUTPATIENT)
Dept: PRIMARY CARE CLINIC | Age: 33
End: 2023-05-26
Payer: COMMERCIAL

## 2023-05-26 VITALS
HEIGHT: 63 IN | HEART RATE: 76 BPM | RESPIRATION RATE: 18 BRPM | SYSTOLIC BLOOD PRESSURE: 118 MMHG | DIASTOLIC BLOOD PRESSURE: 72 MMHG | TEMPERATURE: 97.8 F | WEIGHT: 168 LBS | OXYGEN SATURATION: 98 % | BODY MASS INDEX: 29.77 KG/M2

## 2023-05-26 DIAGNOSIS — E66.09 CLASS 1 OBESITY DUE TO EXCESS CALORIES WITHOUT SERIOUS COMORBIDITY WITH BODY MASS INDEX (BMI) OF 31.0 TO 31.9 IN ADULT: ICD-10-CM

## 2023-05-26 DIAGNOSIS — E04.1 THYROID NODULE: ICD-10-CM

## 2023-05-26 DIAGNOSIS — E03.9 ACQUIRED HYPOTHYROIDISM: ICD-10-CM

## 2023-05-26 DIAGNOSIS — E66.09 CLASS 1 OBESITY DUE TO EXCESS CALORIES WITHOUT SERIOUS COMORBIDITY WITH BODY MASS INDEX (BMI) OF 32.0 TO 32.9 IN ADULT: ICD-10-CM

## 2023-05-26 DIAGNOSIS — Z00.01 ENCOUNTER FOR WELL ADULT EXAM WITH ABNORMAL FINDINGS: Primary | ICD-10-CM

## 2023-05-26 PROCEDURE — 99395 PREV VISIT EST AGE 18-39: CPT | Performed by: FAMILY MEDICINE

## 2023-05-26 RX ORDER — PHENTERMINE HYDROCHLORIDE 37.5 MG/1
37.5 TABLET ORAL
Qty: 30 TABLET | Refills: 0 | Status: SHIPPED | OUTPATIENT
Start: 2023-05-26 | End: 2023-06-25

## 2023-05-26 RX ORDER — PHENTERMINE HYDROCHLORIDE 37.5 MG/1
37.5 TABLET ORAL
Qty: 30 TABLET | Refills: 0 | Status: CANCELLED | OUTPATIENT
Start: 2023-05-26 | End: 2023-06-25

## 2023-05-26 ASSESSMENT — ENCOUNTER SYMPTOMS
SINUS PRESSURE: 0
CONSTIPATION: 0
EYE ITCHING: 0
RHINORRHEA: 0
EYE REDNESS: 0
COLOR CHANGE: 0
COUGH: 0
WHEEZING: 0
VOMITING: 0
BACK PAIN: 0
APNEA: 0
ABDOMINAL PAIN: 0
SHORTNESS OF BREATH: 0
NAUSEA: 0
EYE PAIN: 0
SORE THROAT: 0
DIARRHEA: 0
BLOOD IN STOOL: 0
CHEST TIGHTNESS: 0

## 2023-05-26 NOTE — PROGRESS NOTES
Well Adult Note  Name: Silvio Vivas Date: 2023   MRN: [id number] Sex: Female   Age: 29 y.o. Ethnicity: Non- / Non    : 1990 Race: White (non-)      Stuart Almeida is here for well adult exam.  History: Adipex refill  Some irregular menstrual cycle. PCOS? Thyroid disease. US thyroid? Review of Systems   Constitutional:  Negative for activity change, appetite change, fatigue and fever. HENT:  Negative for congestion, ear pain, hearing loss, nosebleeds, rhinorrhea, sinus pressure and sore throat. Eyes:  Negative for pain, redness, itching and visual disturbance. Respiratory:  Negative for apnea, cough, chest tightness, shortness of breath and wheezing. Cardiovascular:  Negative for chest pain, palpitations and leg swelling. Gastrointestinal:  Negative for abdominal pain, blood in stool, constipation, diarrhea, nausea and vomiting. Endocrine: Negative. Genitourinary:  Negative for decreased urine volume, difficulty urinating, dysuria, frequency, hematuria and urgency. Musculoskeletal:  Negative for arthralgias, back pain, gait problem, myalgias and neck pain. Skin:  Negative for color change and rash. Allergic/Immunologic: Negative for environmental allergies and food allergies. Neurological:  Negative for dizziness, weakness, light-headedness, numbness and headaches. Hematological:  Negative for adenopathy. Does not bruise/bleed easily. Psychiatric/Behavioral:  Negative for behavioral problems, dysphoric mood and sleep disturbance. The patient is not nervous/anxious and is not hyperactive. All other systems reviewed and are negative.     Allergies   Allergen Reactions    Cefdinir Hives     Had reaction as a child  Other reaction(s): AOF    Lomedia 24 Fe [Norethin Ace-Eth Estrad-Fe] Hives    Nutritional Supplements      Other reaction(s): AOF    Sulfa Antibiotics Hives     Joint swell         Prior to Visit Medications

## 2023-07-18 RX ORDER — CITALOPRAM 40 MG/1
TABLET ORAL
Qty: 90 TABLET | Refills: 3 | Status: SHIPPED | OUTPATIENT
Start: 2023-07-18

## 2023-08-14 RX ORDER — MONTELUKAST SODIUM 10 MG/1
10 TABLET ORAL DAILY
Qty: 30 TABLET | Refills: 0 | Status: SHIPPED | OUTPATIENT
Start: 2023-08-14

## 2023-08-17 ENCOUNTER — OFFICE VISIT (OUTPATIENT)
Dept: PRIMARY CARE CLINIC | Age: 33
End: 2023-08-17
Payer: COMMERCIAL

## 2023-08-17 VITALS
WEIGHT: 162 LBS | SYSTOLIC BLOOD PRESSURE: 116 MMHG | HEART RATE: 79 BPM | OXYGEN SATURATION: 99 % | HEIGHT: 63 IN | BODY MASS INDEX: 28.7 KG/M2 | DIASTOLIC BLOOD PRESSURE: 70 MMHG

## 2023-08-17 DIAGNOSIS — E03.8 HYPOTHYROIDISM DUE TO HASHIMOTO'S THYROIDITIS: ICD-10-CM

## 2023-08-17 DIAGNOSIS — J45.41 MODERATE PERSISTENT ASTHMA WITH ACUTE EXACERBATION: Primary | ICD-10-CM

## 2023-08-17 DIAGNOSIS — D51.0 PERNICIOUS ANEMIA: ICD-10-CM

## 2023-08-17 DIAGNOSIS — E03.9 ACQUIRED HYPOTHYROIDISM: ICD-10-CM

## 2023-08-17 DIAGNOSIS — E06.3 HYPOTHYROIDISM DUE TO HASHIMOTO'S THYROIDITIS: ICD-10-CM

## 2023-08-17 DIAGNOSIS — R53.83 FATIGUE, UNSPECIFIED TYPE: ICD-10-CM

## 2023-08-17 DIAGNOSIS — E55.9 VITAMIN D DEFICIENCY: ICD-10-CM

## 2023-08-17 LAB
FOLATE: 13.7 NG/ML (ref 4.8–24.2)
HCT VFR BLD CALC: 41.6 % (ref 34–48)
HEMOGLOBIN: 13.1 G/DL (ref 11.5–15.5)
MCH RBC QN AUTO: 28.9 PG (ref 26–35)
MCHC RBC AUTO-ENTMCNC: 31.5 G/DL (ref 32–34.5)
MCV RBC AUTO: 91.8 FL (ref 80–99.9)
PDW BLD-RTO: 11.9 % (ref 11.5–15)
PLATELET # BLD: 223 K/UL (ref 130–450)
PMV BLD AUTO: 11.4 FL (ref 7–12)
RBC # BLD: 4.53 M/UL (ref 3.5–5.5)
VITAMIN B-12: 714 PG/ML (ref 211–946)
WBC # BLD: 5.9 K/UL (ref 4.5–11.5)

## 2023-08-17 PROCEDURE — 99214 OFFICE O/P EST MOD 30 MIN: CPT | Performed by: FAMILY MEDICINE

## 2023-08-17 RX ORDER — BUDESONIDE AND FORMOTEROL FUMARATE DIHYDRATE 160; 4.5 UG/1; UG/1
2 AEROSOL RESPIRATORY (INHALATION) 2 TIMES DAILY
Qty: 10.2 G | Refills: 3 | Status: SHIPPED | OUTPATIENT
Start: 2023-08-17

## 2023-08-17 ASSESSMENT — ENCOUNTER SYMPTOMS
VISUAL CHANGE: 0
BACK PAIN: 0
NAUSEA: 0
SHORTNESS OF BREATH: 0
SINUS PRESSURE: 1
CHEST TIGHTNESS: 1
EYE REDNESS: 0
CONSTIPATION: 0
SINUS COMPLAINT: 1
SORE THROAT: 0
HAIR LOSS: 0
EYE ITCHING: 0
EYE PAIN: 0
RHINORRHEA: 1
VOMITING: 0
COUGH: 0
WHEEZING: 1
APNEA: 0
COLOR CHANGE: 0
BLOOD IN STOOL: 0
ABDOMINAL PAIN: 0
CHEST TIGHTNESS: 0
DIARRHEA: 0

## 2023-08-17 ASSESSMENT — PATIENT HEALTH QUESTIONNAIRE - PHQ9
SUM OF ALL RESPONSES TO PHQ QUESTIONS 1-9: 0
SUM OF ALL RESPONSES TO PHQ QUESTIONS 1-9: 0
2. FEELING DOWN, DEPRESSED OR HOPELESS: 0
SUM OF ALL RESPONSES TO PHQ QUESTIONS 1-9: 0
SUM OF ALL RESPONSES TO PHQ9 QUESTIONS 1 & 2: 0
1. LITTLE INTEREST OR PLEASURE IN DOING THINGS: 0
SUM OF ALL RESPONSES TO PHQ QUESTIONS 1-9: 0

## 2023-08-17 NOTE — PROGRESS NOTES
Vitamin D deficiency    Gastroesophageal reflux disease without esophagitis    S/P tonsillectomy    Myalgia    Anxiety    Breast mass, left    Seasonal allergies    Mixed hyperlipidemia    Class 1 obesity due to excess calories without serious comorbidity with body mass index (BMI) of 30.0 to 30.9 in adult    Hypothyroidism due to Hashimoto's thyroiditis       Garret was seen today for asthma. Diagnoses and all orders for this visit:    Moderate persistent asthma with acute exacerbation  -     XR CHEST (2 VW); Future    Acquired hypothyroidism  -     TSH; Future  -     T4, Free; Future  -     Lipid Panel; Future    Hypothyroidism due to Hashimoto's thyroiditis  -     TSH; Future  -     T4, Free; Future    Fatigue, unspecified type  -     CBC; Future  -     Comprehensive Metabolic Panel; Future  -     TSH; Future  -     T4, Free; Future    Pernicious anemia  -     Vitamin B12 & Folate; Future    Vitamin D deficiency  -     Vitamin D 25 Hydroxy; Future    Other orders  -     budesonide-formoterol (SYMBICORT) 160-4.5 MCG/ACT AERO; Inhale 2 puffs into the lungs 2 times daily          Return in about 6 months (around 2/17/2024) for [address], Recheck labs. I spent 30 minutes with this patient. I spent greater than 50% of the time counseling this patient.         Chacho Tillman DO  8/17/2023  1:52 PM

## 2023-08-18 LAB
ALBUMIN SERPL-MCNC: 4.8 G/DL (ref 3.5–5.2)
ALP BLD-CCNC: 73 U/L (ref 35–104)
ALT SERPL-CCNC: 10 U/L (ref 0–32)
ANION GAP SERPL CALCULATED.3IONS-SCNC: 16 MMOL/L (ref 7–16)
AST SERPL-CCNC: 18 U/L (ref 0–31)
BILIRUB SERPL-MCNC: 0.4 MG/DL (ref 0–1.2)
BUN BLDV-MCNC: 8 MG/DL (ref 6–20)
CALCIUM SERPL-MCNC: 9.4 MG/DL (ref 8.6–10.2)
CHLORIDE BLD-SCNC: 101 MMOL/L (ref 98–107)
CHOLESTEROL: 201 MG/DL
CO2: 22 MMOL/L (ref 22–29)
CREAT SERPL-MCNC: 0.7 MG/DL (ref 0.5–1)
GFR SERPL CREATININE-BSD FRML MDRD: >60 ML/MIN/1.73M2
GLUCOSE BLD-MCNC: 84 MG/DL (ref 74–99)
HDLC SERPL-MCNC: 46 MG/DL
LDL CHOLESTEROL: 132 MG/DL
POTASSIUM SERPL-SCNC: 4.2 MMOL/L (ref 3.5–5)
SODIUM BLD-SCNC: 139 MMOL/L (ref 132–146)
T4 FREE: 1.4 NG/DL (ref 0.9–1.7)
TOTAL PROTEIN: 8 G/DL (ref 6.4–8.3)
TRIGL SERPL-MCNC: 113 MG/DL
TSH SERPL DL<=0.05 MIU/L-ACNC: 0.56 UIU/ML (ref 0.27–4.2)
VITAMIN D 25-HYDROXY: 57.1 NG/ML (ref 30–100)
VLDLC SERPL CALC-MCNC: 23 MG/DL

## 2023-08-22 RX ORDER — VALACYCLOVIR HYDROCHLORIDE 1 G/1
TABLET, FILM COATED ORAL
Qty: 4 TABLET | Refills: 1 | Status: SHIPPED | OUTPATIENT
Start: 2023-08-22

## 2023-09-08 RX ORDER — MONTELUKAST SODIUM 10 MG/1
TABLET ORAL
Qty: 30 TABLET | Refills: 0 | Status: SHIPPED | OUTPATIENT
Start: 2023-09-08

## 2023-09-19 ENCOUNTER — OFFICE VISIT (OUTPATIENT)
Dept: PRIMARY CARE CLINIC | Age: 33
End: 2023-09-19
Payer: COMMERCIAL

## 2023-09-19 VITALS
RESPIRATION RATE: 16 BRPM | BODY MASS INDEX: 28.88 KG/M2 | HEART RATE: 84 BPM | WEIGHT: 163 LBS | HEIGHT: 63 IN | DIASTOLIC BLOOD PRESSURE: 66 MMHG | SYSTOLIC BLOOD PRESSURE: 120 MMHG | OXYGEN SATURATION: 98 %

## 2023-09-19 DIAGNOSIS — K21.9 GASTROESOPHAGEAL REFLUX DISEASE WITHOUT ESOPHAGITIS: Primary | ICD-10-CM

## 2023-09-19 DIAGNOSIS — E55.9 VITAMIN D DEFICIENCY: ICD-10-CM

## 2023-09-19 DIAGNOSIS — E03.9 ACQUIRED HYPOTHYROIDISM: ICD-10-CM

## 2023-09-19 DIAGNOSIS — J30.2 SEASONAL ALLERGIES: ICD-10-CM

## 2023-09-19 DIAGNOSIS — F41.9 ANXIETY: ICD-10-CM

## 2023-09-19 DIAGNOSIS — R53.83 FATIGUE, UNSPECIFIED TYPE: ICD-10-CM

## 2023-09-19 DIAGNOSIS — E78.2 MIXED HYPERLIPIDEMIA: ICD-10-CM

## 2023-09-19 PROCEDURE — 99214 OFFICE O/P EST MOD 30 MIN: CPT | Performed by: FAMILY MEDICINE

## 2023-09-19 RX ORDER — BUSPIRONE HYDROCHLORIDE 5 MG/1
5 TABLET ORAL NIGHTLY
Qty: 90 TABLET | Refills: 1 | Status: SHIPPED | OUTPATIENT
Start: 2023-09-19

## 2023-09-19 ASSESSMENT — ENCOUNTER SYMPTOMS
DIARRHEA: 0
VISUAL CHANGE: 0
VOMITING: 0
SINUS PRESSURE: 1
ABDOMINAL PAIN: 0
SHORTNESS OF BREATH: 0
NAUSEA: 0
COLOR CHANGE: 0
RHINORRHEA: 1
BACK PAIN: 0
SINUS COMPLAINT: 1
APNEA: 0
CHEST TIGHTNESS: 0
EYE PAIN: 0
BLOOD IN STOOL: 0
COUGH: 0
CHEST TIGHTNESS: 1
WHEEZING: 1
HAIR LOSS: 0
CONSTIPATION: 0
SORE THROAT: 0
EYE REDNESS: 0
EYE ITCHING: 0

## 2023-09-19 NOTE — PROGRESS NOTES
Chief Complaint:     Chief Complaint   Patient presents with    Anxiety    Asthma    Mental Health Problem    Fatigue    Thyroid Problem         Anxiety  Patient reports no chest pain, dizziness, insomnia, nausea, nervous/anxious behavior, palpitations or shortness of breath. Her past medical history is significant for asthma. Asthma  She complains of chest tightness and wheezing. There is no cough or shortness of breath. This is a chronic problem. The current episode started more than 1 year ago. The problem occurs intermittently. The problem has been waxing and waning. Associated symptoms include nasal congestion, rhinorrhea and weight loss. Pertinent negatives include no appetite change, chest pain, ear pain, fever, headaches, myalgias or sore throat. Her symptoms are aggravated by URI, pollen, exposure to smoke, change in weather and exposure to fumes. Her symptoms are alleviated by beta-agonist. She reports moderate improvement on treatment. Her past medical history is significant for asthma. Mental Health Problem  The primary symptoms do not include dysphoric mood, delusions or hallucinations. The current episode started more than 1 month ago. The onset of the illness is precipitated by a stressful event and emotional stress. The degree of incapacity that she is experiencing as a consequence of her illness is mild. Additional symptoms of the illness include fatigue. Additional symptoms of the illness do not include anhedonia, insomnia, hypersomnia, appetite change, visual change, headaches or abdominal pain. Fatigue  This is a recurrent problem. The current episode started 1 to 4 weeks ago. The problem occurs daily. The problem has been unchanged. Associated symptoms include fatigue. Pertinent negatives include no abdominal pain, arthralgias, chest pain, congestion, coughing, fever, headaches, myalgias, nausea, neck pain, numbness, rash, sore throat, visual change, vomiting or weakness.  Nothing

## 2023-09-22 ASSESSMENT — ENCOUNTER SYMPTOMS
BACK PAIN: 0
CHOKING: 0
CHEST TIGHTNESS: 0
EYE DISCHARGE: 0
EYE ITCHING: 0
WHEEZING: 0
SHORTNESS OF BREATH: 0
COUGH: 0

## 2023-10-02 ENCOUNTER — OFFICE VISIT (OUTPATIENT)
Dept: BREAST CENTER | Age: 33
End: 2023-10-02
Payer: COMMERCIAL

## 2023-10-02 VITALS
TEMPERATURE: 98.3 F | HEIGHT: 63 IN | DIASTOLIC BLOOD PRESSURE: 62 MMHG | OXYGEN SATURATION: 99 % | SYSTOLIC BLOOD PRESSURE: 102 MMHG | WEIGHT: 165 LBS | RESPIRATION RATE: 16 BRPM | HEART RATE: 78 BPM | BODY MASS INDEX: 29.23 KG/M2

## 2023-10-02 DIAGNOSIS — Z80.3 FAMILY HISTORY OF BREAST CANCER: Primary | ICD-10-CM

## 2023-10-02 PROCEDURE — 99214 OFFICE O/P EST MOD 30 MIN: CPT | Performed by: NURSE PRACTITIONER

## 2023-10-02 NOTE — PATIENT INSTRUCTIONS
Breast Pain    When You Have Breast Pain  Breast pain is a common problem in younger women who are still having their periods (menstrual cycle). It is less common in older women. The pain can be in one breast or in both. It may come and go each month, or it may last for several weeks, or even months, and then just go away. What causes breast pain? Pain or tenderness in your breasts can have many causes. Here are some of them:    Hormone changes during your period   Water retention, which may happen during your period   Injury to your breast   Pregnancy   Breast-feeding (nursing)   An infection in the breast   Breast cancer (not a usual cause of breast pain)   How can my doctor find the cause of my breast pain? Your doctor will ask you questions to find the cause of your pain and decide if you need treatment. Your doctor will also check to see if you have lumps in your breasts. If you are younger than 28years of age and don't have a breast lump, your doctor might decide that you don't need any tests. If you are more than 28years of age and don't have a breast lump, your doctor might want you to get a mammogram. A mammogram is a special x-ray of the breasts. If you have a lump (or several lumps) in your breast, your doctor might decide that you need one or more of these tests:    A mammogram   A breast sonogram. This painless test uses sound waves to make a picture of the lump. A breast biopsy. For this test, some tissue is taken out of your breast and looked at under a microscope. How is breast pain treated? There are different treatments for breast pain. You and your doctor can talk about these treatments and choose one or more that might work for you.  Here are some treatments for breast pain that does not seem to have a cause:    Wearing a support bra   Taking an over-the-counter pain medicine   Topical analgesic creams (Aspercreme with lidocaine or Voltaren Gel)  Taking evening primrose oil:

## 2023-10-04 ENCOUNTER — HOSPITAL ENCOUNTER (EMERGENCY)
Dept: HOSPITAL 83 - ED | Age: 33
Discharge: LEFT BEFORE DISCHARGE | End: 2023-10-04
Payer: COMMERCIAL

## 2023-10-04 VITALS — BODY MASS INDEX: 28.88 KG/M2 | HEIGHT: 62.99 IN | WEIGHT: 163 LBS

## 2023-10-04 DIAGNOSIS — M25.512: ICD-10-CM

## 2023-10-04 DIAGNOSIS — Z88.6: ICD-10-CM

## 2023-10-04 DIAGNOSIS — M54.50: ICD-10-CM

## 2023-10-04 DIAGNOSIS — R51.9: ICD-10-CM

## 2023-10-04 DIAGNOSIS — R42: Primary | ICD-10-CM

## 2023-10-04 DIAGNOSIS — Z88.2: ICD-10-CM

## 2023-10-04 DIAGNOSIS — Z88.8: ICD-10-CM

## 2023-10-06 ENCOUNTER — TELEPHONE (OUTPATIENT)
Dept: BREAST CENTER | Age: 33
End: 2023-10-06

## 2023-10-06 DIAGNOSIS — Z80.3: ICD-10-CM

## 2023-10-06 DIAGNOSIS — R92.30 DENSE BREAST TISSUE ON MAMMOGRAM, UNSPECIFIED TYPE: ICD-10-CM

## 2023-10-06 DIAGNOSIS — N64.4 BREAST PAIN, RIGHT: ICD-10-CM

## 2023-10-06 DIAGNOSIS — Z01.818 PREOP TESTING: ICD-10-CM

## 2023-10-06 DIAGNOSIS — Z80.3 FAMILY HISTORY OF BREAST CANCER: Primary | ICD-10-CM

## 2023-10-06 NOTE — TELEPHONE ENCOUNTER
Authorization is pending for breast MRI  20404 -- faxed patient information to [facility] / [address] [facility]  Clinical Review -- Case# [id number] - review may take up to 2 business days.   Request marked urgent -- Spoke with  - Zeke Ramires

## 2023-10-06 NOTE — TELEPHONE ENCOUNTER
Received a phone call from Pancho Raygoza this morning to complain of increasing right breast mastalgia. She reports it is now radiating to the nipple and is causing her significant discomfort. She has no erythema or edema. Her last office visit here was 4 days ago on 10/02/2023 with clinical findings of dense breast tissue but no distinct mass was appreciated. On her visit she did report a new family history of a maternal first cousin with breast cancer diagnosed at age 45. She had a negative diagnostic bilateral mammogram and right breast ultrasound in December 2022. In view of her family history, dense breast tissue, and progressive right-sided mastalgia, recommend MRI of the bilateral breast to be done at this time. Further recommendations will be made once MRI is complete. Kiki Sandoval, RN, MSN, APRN-CNP, [address] [place] [address].  Advanced Oncology Certified Nurse Practitioner  Department of Breast Surgery  [facility]  [facility] in collaboration with Dr. Zaria Handy.  Juani/Dr. Kameron Almendarez/Dr. Florentin Roberson APRN-CNP

## 2023-10-09 DIAGNOSIS — Z01.818 PREOP TESTING: ICD-10-CM

## 2023-10-09 LAB
BUN BLDV-MCNC: 10 MG/DL (ref 6–20)
CREAT SERPL-MCNC: 0.6 MG/DL (ref 0.5–1)
GFR SERPL CREATININE-BSD FRML MDRD: >60 ML/MIN/1.73M2

## 2023-10-11 ENCOUNTER — HOSPITAL ENCOUNTER (OUTPATIENT)
Dept: MRI IMAGING | Age: 33
Discharge: HOME | End: 2023-10-13
Payer: COMMERCIAL

## 2023-10-11 DIAGNOSIS — Z80.3 FAMILY HISTORY OF BREAST CANCER: ICD-10-CM

## 2023-10-11 DIAGNOSIS — R92.30 DENSE BREAST TISSUE ON MAMMOGRAM, UNSPECIFIED TYPE: ICD-10-CM

## 2023-10-11 DIAGNOSIS — N64.4 BREAST PAIN, RIGHT: ICD-10-CM

## 2023-10-11 DIAGNOSIS — Z80.3: ICD-10-CM

## 2023-10-11 PROCEDURE — C8908 MRI W/O FOL W/CONT, BREAST,: HCPCS

## 2023-10-11 PROCEDURE — 6360000004 HC RX CONTRAST MEDICATION: Performed by: RADIOLOGY

## 2023-10-11 PROCEDURE — A9585 GADOBUTROL INJECTION: HCPCS | Performed by: RADIOLOGY

## 2023-10-11 RX ORDER — MONTELUKAST SODIUM 10 MG/1
TABLET ORAL
Qty: 30 TABLET | Refills: 0 | Status: SHIPPED | OUTPATIENT
Start: 2023-10-11

## 2023-10-11 RX ORDER — GADOBUTROL 604.72 MG/ML
7 INJECTION INTRAVENOUS
Status: COMPLETED | OUTPATIENT
Start: 2023-10-11 | End: 2023-10-11

## 2023-10-11 RX ADMIN — GADOBUTROL 7 ML: 604.72 INJECTION INTRAVENOUS at 08:39

## 2023-10-30 ENCOUNTER — TELEPHONE (OUTPATIENT)
Dept: ADMINISTRATIVE | Age: 33
End: 2023-10-30

## 2023-10-30 ENCOUNTER — TRANSCRIBE ORDERS (OUTPATIENT)
Dept: ADMINISTRATIVE | Age: 33
End: 2023-10-30

## 2023-10-30 DIAGNOSIS — J18.9 UNRESOLVED PNEUMONIA: Primary | ICD-10-CM

## 2023-10-30 DIAGNOSIS — R07.9 CHEST PAIN, UNSPECIFIED TYPE: ICD-10-CM

## 2023-10-30 DIAGNOSIS — R05.3 CHRONIC COUGH: ICD-10-CM

## 2023-10-30 NOTE — TELEPHONE ENCOUNTER
Patient Appointment Form:      PCP: kailee  Referring: kailee    Has the Patient:    Seen a Cardiologist? yes    date:2015  Physician:Last saw Dr Herb Ro  location:[place]    Had a heart catheterization? no    Had heart surgery? no    Had a stress test or nuclear stress test? yes   date: 2015   [facility]    Had an echocardiogram? yes   date: 2015   [facility]    Had a vascular ultrasound? no    Had a 24/48 heart monitor or extended cardiac event monitor? no    Had recent blood work in the last 6 months? yes    date: 8/2023    ordering physician: Gretchen Briceno    Had a pacemaker/ICD/ILR implant? no    Seen an Electrophysiologist? no        Will send records via: in [address] [place] [address]      Date & time of appointment:  11/13/2023 8:45 Dr Edilson Mccall

## 2023-11-09 ENCOUNTER — HOSPITAL ENCOUNTER (OUTPATIENT)
Dept: CT IMAGING | Age: 33
Discharge: HOME | End: 2023-11-11
Payer: COMMERCIAL

## 2023-11-09 DIAGNOSIS — R07.9 CHEST PAIN, UNSPECIFIED TYPE: ICD-10-CM

## 2023-11-09 DIAGNOSIS — R05.3 CHRONIC COUGH: ICD-10-CM

## 2023-11-09 DIAGNOSIS — J18.9 UNRESOLVED PNEUMONIA: ICD-10-CM

## 2023-11-09 PROCEDURE — 71260 CT THORAX DX C+: CPT

## 2023-11-09 PROCEDURE — 6360000004 HC RX CONTRAST MEDICATION: Performed by: RADIOLOGY

## 2023-11-09 PROCEDURE — 2580000003 HC RX 258: Performed by: RADIOLOGY

## 2023-11-09 RX ORDER — SODIUM CHLORIDE 0.9 % (FLUSH) 0.9 %
5-40 SYRINGE (ML) INJECTION 2 TIMES DAILY
Status: DISCONTINUED | OUTPATIENT
Start: 2023-11-09 | End: 2023-11-12 | Stop reason: HOSPADM

## 2023-11-09 RX ADMIN — IOPAMIDOL 50 ML: 755 INJECTION, SOLUTION INTRAVENOUS at 11:10

## 2023-11-09 RX ADMIN — SODIUM CHLORIDE, PRESERVATIVE FREE 10 ML: 5 INJECTION INTRAVENOUS at 11:11

## 2024-01-16 RX ORDER — LEVOTHYROXINE SODIUM 0.07 MG/1
75 TABLET ORAL DAILY
Qty: 90 TABLET | Refills: 3 | Status: SHIPPED
Start: 2024-01-16 | End: 2024-01-18 | Stop reason: SDUPTHER

## 2024-01-30 DIAGNOSIS — F41.9 ANXIETY: ICD-10-CM

## 2024-01-30 RX ORDER — CLONAZEPAM 0.5 MG/1
TABLET ORAL
Qty: 60 TABLET | Refills: 0 | Status: SHIPPED | OUTPATIENT
Start: 2024-01-30 | End: 2025-03-28

## 2024-02-14 ENCOUNTER — OFFICE VISIT (OUTPATIENT)
Dept: BARIATRICS/WEIGHT MGMT | Age: 34
End: 2024-02-14
Payer: COMMERCIAL

## 2024-02-14 VITALS
HEART RATE: 68 BPM | DIASTOLIC BLOOD PRESSURE: 79 MMHG | HEIGHT: 63 IN | TEMPERATURE: 97.9 F | BODY MASS INDEX: 29.95 KG/M2 | SYSTOLIC BLOOD PRESSURE: 116 MMHG | WEIGHT: 169 LBS

## 2024-02-14 DIAGNOSIS — E06.3 HYPOTHYROIDISM DUE TO HASHIMOTO'S THYROIDITIS: Primary | ICD-10-CM

## 2024-02-14 DIAGNOSIS — E03.8 HYPOTHYROIDISM DUE TO HASHIMOTO'S THYROIDITIS: Primary | ICD-10-CM

## 2024-02-14 DIAGNOSIS — E66.09 CLASS 1 OBESITY DUE TO EXCESS CALORIES WITHOUT SERIOUS COMORBIDITY WITH BODY MASS INDEX (BMI) OF 30.0 TO 30.9 IN ADULT: ICD-10-CM

## 2024-02-14 PROBLEM — E66.9 OBESITY: Status: ACTIVE | Noted: 2024-02-14

## 2024-02-14 PROBLEM — E66.3 OVERWEIGHT (BMI 25.0-29.9): Status: ACTIVE | Noted: 2024-02-14

## 2024-02-14 PROCEDURE — 99211 OFF/OP EST MAY X REQ PHY/QHP: CPT | Performed by: INTERNAL MEDICINE

## 2024-02-14 RX ORDER — TIRZEPATIDE 5 MG/.5ML
5 INJECTION, SOLUTION SUBCUTANEOUS WEEKLY
Qty: 2 ML | Refills: 5 | Status: SHIPPED | OUTPATIENT
Start: 2024-02-14

## 2024-02-14 RX ORDER — TIRZEPATIDE 2.5 MG/.5ML
2.5 INJECTION, SOLUTION SUBCUTANEOUS WEEKLY
Qty: 2 ML | Refills: 0 | Status: SHIPPED | OUTPATIENT
Start: 2024-02-14

## 2024-02-14 NOTE — PROGRESS NOTES
CC -   Hypothyroidism, Obesity    BACKGROUND -   Last visit:  12/08/21  First visit:  12/08/21    Obesity   Began 2015  Initial BMI 29.9, Wt 169.0 lbs, Ht 5' 3\" as of 2/14/24  HS Grad wt 130 lbs   Lowest   wt  122 lbs  Highest  wt  198 lbs  Pattern of wt gain: rapid gain during preg's, then grad   Wt change past yr: up 9 lbs  Most wt lost: 25 lbs (Calorie counting)  Other diets attempted: Keto, WW, Vegetarian, Amazing 12 + exercise with , Phentermine (LOPEZ, had to stop), Contrave (nausea)    Desire to weight: 10/10  Prob posed by appetite: 8-9/10    Initial Diet as of 2/14/24:    Number of meals per day - 2-3    Number of snacks per day - Grazing    Meal volume - 12\" plate, no seconds    Fast food/convenience store - 1x/week    Restaurants (not fast food) - 1x/week   Sweets - 7d/week (reg size Thurman bar or 10 Kisses)   Chips - 3d/week (1 oz, Sunchips)   Crackers/pretzels - 0d/week   Nuts - 5-6d/week (one palmful)   Peanut Butter - 7d/week (one-two tablespoons)   Popcorn - 5-6d/week (3 handfuls of Boom Chicka Sweet and Salty Kettle Corn 70 olivia/cup)   Dried fruit - 0d/week   Whole fruit - 7d/week (1 servings)   Breakfast cereal - 0d/week    Granola/Protein/Energy bar - 2d/week (300 olivia Power Bar as a snack)   Sugar sweetened beverages - 2 cups of coffee/d, each with 1/4 cup of flavored creamer (50% 30 olivia/Tbsp, 50% 35 olivia/Tbsp), 25 olivia Poppi soda every other day   Protein - 300 olivia Power Bar 2d/wk   Fiber - No supplements     Exercise:    Gym membership - none    Walking - none    Running - none    Resistance - none    Aerobic class - 30 min, 3d/wk (Didi-Dache anita)    ______________________    PFSH -  Past Medical History:   Diagnosis Date    Anxiety     Asthma     excersise induced and allergy induced    Class 1 obesity due to excess calories without serious comorbidity with body mass index (BMI) of 30.0 to 30.9 in adult     Enlarged lymph node     Bx 2018, neg    Environmental allergies     Hypothyroidism

## 2024-02-14 NOTE — PATIENT INSTRUCTIONS
Instructions:  Rules:  Count every calorie every day  Limit sweets to one day per month  Limit chips/crackers/pretzels/nuts/popcorn/peanut butter to 150 olivia/day  Limit restaurants (including fast food and food from a convenience store) to one time every two weeks    Requirements:  Make sure protein intake is at least 65 grams per day (do not count protein every day; instead spot check your intake every 2-3 weeks and make sure what you think you are getting is close to accurate; consider using a protein shake if needed; these are in the pharmacy section of the stores, not the grocery section; Premier, Pure Protein and Fairlife are relatively inexpensive and taste good to most patients; other options are Nectar, Boost Max, Ensure Max, BeneProtein and GNC lean (which is lactose-free);   Nectar fruit, Premier Protein Clear, IsoPure Protein Drink, and Protein 2 O are water-based options; Quest (or Cosco, which is cheaper and is ordered on Amazon) and the Apex Guard protein bars can also be used, but have less protein in them )  Make sure that fiber intake is at least 22 grams per day. Do this by either eating 12 tablespoons of the original, plain Fiber One cereal every day or 4 tablespoons of wheat dextrin powder (Benefiber or a generic brand) every day. Work up to this amount slowly by starting with only one-eighth to one-fourth of the target amount and then adding another one-eighth to one-fourth every one or two weeks until reaching the target.  Take one multivitamin every day    Targets:  Limit calorie intake to 1400 calories/day  Walk 30 minutes daily  Avoid eating 2 hours within bedtime.   Limit non-protein calories in coffee to <40 calories/d total; up to 12 oz of low milk is allowed each day and a protein shake can be used as creamer without restriction     Tips:  Do not eat outside of the dining room or the kitchen  Do not eat while watching TV, videos, working on the computer or using a smart phone  Do not eat

## 2024-03-07 RX ORDER — BUSPIRONE HYDROCHLORIDE 5 MG/1
5 TABLET ORAL NIGHTLY
Qty: 90 TABLET | Refills: 1 | Status: SHIPPED | OUTPATIENT
Start: 2024-03-07

## 2024-03-14 ENCOUNTER — OFFICE VISIT (OUTPATIENT)
Dept: PRIMARY CARE CLINIC | Age: 34
End: 2024-03-14
Payer: COMMERCIAL

## 2024-03-14 VITALS
WEIGHT: 167 LBS | OXYGEN SATURATION: 97 % | HEIGHT: 63 IN | TEMPERATURE: 97.8 F | HEART RATE: 74 BPM | RESPIRATION RATE: 18 BRPM | DIASTOLIC BLOOD PRESSURE: 74 MMHG | BODY MASS INDEX: 29.59 KG/M2 | SYSTOLIC BLOOD PRESSURE: 120 MMHG

## 2024-03-14 DIAGNOSIS — Z00.01 ENCOUNTER FOR WELL ADULT EXAM WITH ABNORMAL FINDINGS: ICD-10-CM

## 2024-03-14 DIAGNOSIS — Z00.01 ENCOUNTER FOR WELL ADULT EXAM WITH ABNORMAL FINDINGS: Primary | ICD-10-CM

## 2024-03-14 DIAGNOSIS — E03.9 ACQUIRED HYPOTHYROIDISM: ICD-10-CM

## 2024-03-14 PROCEDURE — 99395 PREV VISIT EST AGE 18-39: CPT | Performed by: FAMILY MEDICINE

## 2024-03-14 RX ORDER — VALACYCLOVIR HYDROCHLORIDE 1 G/1
TABLET, FILM COATED ORAL
Qty: 4 TABLET | Refills: 1 | Status: SHIPPED | OUTPATIENT
Start: 2024-03-14

## 2024-03-14 RX ORDER — SERTRALINE HYDROCHLORIDE 25 MG/1
25 TABLET, FILM COATED ORAL DAILY
COMMUNITY
Start: 2024-03-13

## 2024-03-14 SDOH — ECONOMIC STABILITY: HOUSING INSECURITY
IN THE LAST 12 MONTHS, WAS THERE A TIME WHEN YOU DID NOT HAVE A STEADY PLACE TO SLEEP OR SLEPT IN A SHELTER (INCLUDING NOW)?: NO

## 2024-03-14 SDOH — ECONOMIC STABILITY: FOOD INSECURITY: WITHIN THE PAST 12 MONTHS, THE FOOD YOU BOUGHT JUST DIDN'T LAST AND YOU DIDN'T HAVE MONEY TO GET MORE.: NEVER TRUE

## 2024-03-14 SDOH — ECONOMIC STABILITY: INCOME INSECURITY: HOW HARD IS IT FOR YOU TO PAY FOR THE VERY BASICS LIKE FOOD, HOUSING, MEDICAL CARE, AND HEATING?: NOT HARD AT ALL

## 2024-03-14 SDOH — ECONOMIC STABILITY: FOOD INSECURITY: WITHIN THE PAST 12 MONTHS, YOU WORRIED THAT YOUR FOOD WOULD RUN OUT BEFORE YOU GOT MONEY TO BUY MORE.: NEVER TRUE

## 2024-03-14 ASSESSMENT — PATIENT HEALTH QUESTIONNAIRE - PHQ9
SUM OF ALL RESPONSES TO PHQ QUESTIONS 1-9: 0
SUM OF ALL RESPONSES TO PHQ QUESTIONS 1-9: 0
2. FEELING DOWN, DEPRESSED OR HOPELESS: 0
1. LITTLE INTEREST OR PLEASURE IN DOING THINGS: 0
SUM OF ALL RESPONSES TO PHQ QUESTIONS 1-9: 0
SUM OF ALL RESPONSES TO PHQ9 QUESTIONS 1 & 2: 0
SUM OF ALL RESPONSES TO PHQ QUESTIONS 1-9: 0

## 2024-03-14 ASSESSMENT — ENCOUNTER SYMPTOMS
DIARRHEA: 0
COLOR CHANGE: 0
CONSTIPATION: 0
EYE REDNESS: 0
SHORTNESS OF BREATH: 0
BLOOD IN STOOL: 0
RHINORRHEA: 0
NAUSEA: 0
VOMITING: 0
SORE THROAT: 0
BACK PAIN: 0
ABDOMINAL PAIN: 0
COUGH: 0
SINUS PRESSURE: 0
WHEEZING: 0
EYE ITCHING: 0
APNEA: 0
CHEST TIGHTNESS: 0
EYE PAIN: 0

## 2024-03-14 NOTE — PROGRESS NOTES
Well Adult Note  Name: Garret Rondon Today’s Date: 3/14/2024   MRN: [id number] Sex: Female   Age: 33 y.o. Ethnicity: Non- / Non    : 1990 Race: White (non-)      Garret Rondon is here for well adult exam.  History:  Feels well  Healthy  Appetite good  No change in bowel or bladder function  Vaccines UTD    Review of Systems   Constitutional:  Negative for activity change, appetite change, fatigue and fever.   HENT:  Negative for congestion, ear pain, hearing loss, nosebleeds, rhinorrhea, sinus pressure and sore throat.    Eyes:  Negative for pain, redness, itching and visual disturbance.   Respiratory:  Negative for apnea, cough, chest tightness, shortness of breath and wheezing.    Cardiovascular:  Negative for chest pain, palpitations and leg swelling.   Gastrointestinal:  Negative for abdominal pain, blood in stool, constipation, diarrhea, nausea and vomiting.   Endocrine: Negative.    Genitourinary:  Negative for decreased urine volume, difficulty urinating, dysuria, frequency, hematuria and urgency.   Musculoskeletal:  Negative for arthralgias, back pain, gait problem, myalgias and neck pain.   Skin:  Negative for color change and rash.   Allergic/Immunologic: Negative for environmental allergies and food allergies.   Neurological:  Negative for dizziness, weakness, light-headedness, numbness and headaches.   Hematological:  Negative for adenopathy. Does not bruise/bleed easily.   Psychiatric/Behavioral:  Negative for behavioral problems, dysphoric mood and sleep disturbance. The patient is not nervous/anxious and is not hyperactive.    All other systems reviewed and are negative.      Allergies   Allergen Reactions    Cefdinir Hives     Had reaction as a child  Other reaction(s): AOF    Lomedia 24 Fe [Norethin Ace-Eth Estrad-Fe] Hives    Sulfa Antibiotics Hives     Joint swell         Prior to Visit Medications    Medication Sig Taking? Authorizing Provider   sertraline

## 2024-03-15 DIAGNOSIS — E78.2 MIXED HYPERLIPIDEMIA: Primary | ICD-10-CM

## 2024-03-15 LAB
ALBUMIN SERPL-MCNC: 4.6 G/DL (ref 3.5–5.2)
ALP BLD-CCNC: 69 U/L (ref 35–104)
ALT SERPL-CCNC: 11 U/L (ref 0–32)
ANION GAP SERPL CALCULATED.3IONS-SCNC: 14 MMOL/L (ref 7–16)
AST SERPL-CCNC: 20 U/L (ref 0–31)
BILIRUB SERPL-MCNC: 0.3 MG/DL (ref 0–1.2)
BUN BLDV-MCNC: 17 MG/DL (ref 6–20)
CALCIUM SERPL-MCNC: 9.4 MG/DL (ref 8.6–10.2)
CHLORIDE BLD-SCNC: 103 MMOL/L (ref 98–107)
CHOLESTEROL: 228 MG/DL
CO2: 21 MMOL/L (ref 22–29)
CREAT SERPL-MCNC: 0.6 MG/DL (ref 0.5–1)
GFR SERPL CREATININE-BSD FRML MDRD: >60 ML/MIN/1.73M2
GLUCOSE BLD-MCNC: 84 MG/DL (ref 74–99)
HDLC SERPL-MCNC: 43 MG/DL
LDL CHOLESTEROL: 168 MG/DL
POTASSIUM SERPL-SCNC: 4.9 MMOL/L (ref 3.5–5)
SODIUM BLD-SCNC: 138 MMOL/L (ref 132–146)
T4 FREE: 1.2 NG/DL (ref 0.9–1.7)
TOTAL PROTEIN: 7.8 G/DL (ref 6.4–8.3)
TRIGL SERPL-MCNC: 86 MG/DL
TSH SERPL DL<=0.05 MIU/L-ACNC: 0.77 UIU/ML (ref 0.27–4.2)
VLDLC SERPL CALC-MCNC: 17 MG/DL

## 2024-03-28 NOTE — PROGRESS NOTES
health care.  We discussed possible results: positive, negative and variants of unknown significance.  Management options without genetic test results compared to recommendations associated with potential test results.  Practical aspects of genetic testing, such as cost and turnaround time.  We discussed legal considerations of genetic testing.  We reviewed legal protections against health insurance and employment discrimination (GABRIEL-Genetic Information Nondiscrimination Act) which generally makes it illegal for health insurance companies, group health plans, and most employers (with > 15 employees) to discriminate based on genetic information.  It does not protect against genetic discrimination for life insurance, disability insurance, or long-term care insurance.  We discussed the availability of a genetic counselor to provide additional information, risk assessment and counseling on the emotional and social implications (privacy concerns) of genetic testing and to further assist with decision-making about testing or discuss follow-up regarding results.   She was offered the option of thinking about this information prior to making a decision and at this time has declined genetic testing.  We discussed her bilateral mastalgia and today she reports that it is improved compared to prior.  We discussed conservative measures and the importance of calling for any new or worsening symptoms.  Reviewed NCCN guidelines for breast cancer screening to begin with annual mammograms beginning at age 40.  She was advised to contact us in the event that she would have any newly identified family members with a diagnosis of cancer.  At this time, recommend 6-month follow-up for clinical exam; sooner for any new or worsening symptoms.  Mammogram annually beginning at age 40.  Was also offered a consultation with Dr. Stacy for a second opinion; at this time she is declining.    11/01/2023 CT abdomen and pelvis for complaints of

## 2024-04-08 ENCOUNTER — OFFICE VISIT (OUTPATIENT)
Dept: BREAST CENTER | Age: 34
End: 2024-04-08
Payer: COMMERCIAL

## 2024-04-08 VITALS
HEIGHT: 63 IN | RESPIRATION RATE: 20 BRPM | HEART RATE: 75 BPM | BODY MASS INDEX: 28.53 KG/M2 | SYSTOLIC BLOOD PRESSURE: 106 MMHG | TEMPERATURE: 98.2 F | DIASTOLIC BLOOD PRESSURE: 70 MMHG | OXYGEN SATURATION: 98 % | WEIGHT: 161 LBS

## 2024-04-08 DIAGNOSIS — Z80.3 FAMILY HISTORY OF BREAST CANCER: Primary | ICD-10-CM

## 2024-04-08 PROCEDURE — 99213 OFFICE O/P EST LOW 20 MIN: CPT | Performed by: NURSE PRACTITIONER

## 2024-04-11 RX ORDER — VALACYCLOVIR HYDROCHLORIDE 1 G/1
TABLET, FILM COATED ORAL
Qty: 4 TABLET | Refills: 1 | Status: SHIPPED | OUTPATIENT
Start: 2024-04-11

## 2024-04-24 DIAGNOSIS — E55.9 VITAMIN D DEFICIENCY: ICD-10-CM

## 2024-04-24 DIAGNOSIS — D64.9 ANEMIA, UNSPECIFIED TYPE: ICD-10-CM

## 2024-04-24 DIAGNOSIS — E03.8 HYPOTHYROIDISM DUE TO HASHIMOTO'S THYROIDITIS: ICD-10-CM

## 2024-04-24 DIAGNOSIS — E06.3 HYPOTHYROIDISM DUE TO HASHIMOTO'S THYROIDITIS: ICD-10-CM

## 2024-04-24 LAB
ALBUMIN: 4.5 G/DL (ref 3.5–5.2)
ALP BLD-CCNC: 78 U/L (ref 35–104)
ALT SERPL-CCNC: 25 U/L (ref 0–32)
ANION GAP SERPL CALCULATED.3IONS-SCNC: 17 MMOL/L (ref 7–16)
AST SERPL-CCNC: 22 U/L (ref 0–31)
BILIRUB SERPL-MCNC: 0.2 MG/DL (ref 0–1.2)
BUN BLDV-MCNC: 18 MG/DL (ref 6–20)
CALCIUM SERPL-MCNC: 9.5 MG/DL (ref 8.6–10.2)
CHLORIDE BLD-SCNC: 103 MMOL/L (ref 98–107)
CO2: 21 MMOL/L (ref 22–29)
CREAT SERPL-MCNC: 0.6 MG/DL (ref 0.5–1)
FERRITIN: 106 NG/ML
GFR SERPL CREATININE-BSD FRML MDRD: >90 ML/MIN/1.73M2
GLUCOSE BLD-MCNC: 90 MG/DL (ref 74–99)
HCT VFR BLD CALC: 40 % (ref 34–48)
HEMOGLOBIN: 12.9 G/DL (ref 11.5–15.5)
IRON % SATURATION: 15 % (ref 15–50)
IRON: 47 UG/DL (ref 37–145)
MCH RBC QN AUTO: 29.3 PG (ref 26–35)
MCHC RBC AUTO-ENTMCNC: 32.3 G/DL (ref 32–34.5)
MCV RBC AUTO: 90.7 FL (ref 80–99.9)
PDW BLD-RTO: 11.8 % (ref 11.5–15)
PLATELET # BLD: 247 K/UL (ref 130–450)
PMV BLD AUTO: 11 FL (ref 7–12)
POTASSIUM SERPL-SCNC: 4.3 MMOL/L (ref 3.5–5)
RBC # BLD: 4.41 M/UL (ref 3.5–5.5)
SODIUM BLD-SCNC: 141 MMOL/L (ref 132–146)
T4 FREE: 1.2 NG/DL (ref 0.9–1.7)
TOTAL IRON BINDING CAPACITY: 322 UG/DL (ref 250–450)
TOTAL PROTEIN: 7.7 G/DL (ref 6.4–8.3)
TSH SERPL DL<=0.05 MIU/L-ACNC: 2.43 UIU/ML (ref 0.27–4.2)
VITAMIN D 25-HYDROXY: 36.3 NG/ML (ref 30–100)
WBC # BLD: 5.9 K/UL (ref 4.5–11.5)

## 2024-05-13 RX ORDER — VALACYCLOVIR HYDROCHLORIDE 1 G/1
TABLET, FILM COATED ORAL
Qty: 4 TABLET | Refills: 1 | Status: SHIPPED | OUTPATIENT
Start: 2024-05-13

## 2024-06-19 DIAGNOSIS — E78.2 MIXED HYPERLIPIDEMIA: ICD-10-CM

## 2024-06-19 DIAGNOSIS — M79.10 MYALGIA: ICD-10-CM

## 2024-06-19 LAB
C-REACTIVE PROTEIN: 3 MG/L (ref 0–5)
CHOLESTEROL, TOTAL: 233 MG/DL
HDLC SERPL-MCNC: 47 MG/DL
LDL CHOLESTEROL: 168 MG/DL
RHEUMATOID FACTOR: <10 IU/ML (ref 0–13)
SED RATE, AUTOMATED: 25 MM/HR (ref 0–20)
TRIGL SERPL-MCNC: 89 MG/DL
VLDLC SERPL CALC-MCNC: 18 MG/DL

## 2024-06-20 LAB — ANTI-NUCLEAR ANTIBODY (ANA): NEGATIVE

## 2024-06-21 ENCOUNTER — OFFICE VISIT (OUTPATIENT)
Dept: FAMILY MEDICINE CLINIC | Age: 34
End: 2024-06-21
Payer: COMMERCIAL

## 2024-06-21 VITALS
WEIGHT: 170 LBS | SYSTOLIC BLOOD PRESSURE: 130 MMHG | DIASTOLIC BLOOD PRESSURE: 80 MMHG | HEART RATE: 84 BPM | BODY MASS INDEX: 30.11 KG/M2 | TEMPERATURE: 99.4 F | OXYGEN SATURATION: 99 %

## 2024-06-21 DIAGNOSIS — J32.9 SINOBRONCHITIS: Primary | ICD-10-CM

## 2024-06-21 DIAGNOSIS — R50.9 FEVER, UNSPECIFIED FEVER CAUSE: ICD-10-CM

## 2024-06-21 DIAGNOSIS — J40 SINOBRONCHITIS: Primary | ICD-10-CM

## 2024-06-21 PROCEDURE — 99213 OFFICE O/P EST LOW 20 MIN: CPT | Performed by: INTERNAL MEDICINE

## 2024-06-21 RX ORDER — PREDNISONE 10 MG/1
TABLET ORAL
Qty: 12 TABLET | Refills: 0 | Status: SHIPPED | OUTPATIENT
Start: 2024-06-21 | End: 2024-06-26

## 2024-06-21 RX ORDER — AZITHROMYCIN 250 MG/1
250 TABLET, FILM COATED ORAL SEE ADMIN INSTRUCTIONS
Qty: 6 TABLET | Refills: 0 | Status: SHIPPED | OUTPATIENT
Start: 2024-06-21 | End: 2024-06-26

## 2024-06-21 ASSESSMENT — ENCOUNTER SYMPTOMS
SORE THROAT: 1
SINUS PRESSURE: 1
EYES NEGATIVE: 1
COUGH: 1
CHEST TIGHTNESS: 0
ABDOMINAL PAIN: 0
WHEEZING: 0
SHORTNESS OF BREATH: 0
NAUSEA: 0

## 2024-06-21 NOTE — PROGRESS NOTES
[facility]     24  Garret Rondon : 1990 Sex: female  Age: 33 y.o.    Chief Complaint   Patient presents with    Cough    Congestion     Patient has been sick for 4 weeks.         HPI  Patient presents to express care today stating that she has been sick for the past week now her children have been ill recently diagnosed with enterovirus rhinovirus and mycoplasma pneumonia.  They did respond to see back and are better.  She was given a course of doxycycline x 1 but states this did not help completely.  She did have initially had symptoms which moved to her chest and did respond to the doxycycline but now again back to head and sinus symptoms.  Complains of a tremendous amount of mucus drainage that has now become colored with greenish mucus.  States she feels like she is drowning from all the secretions.  She admits to bilateral ear muffling and popping.  Off-and-on sore throat.  Teeth hurt.  She has cough which is productive.  No shortness of breath.  Has had no chills but has had low-grade temperature intermittently.  Has used Mucinex D over-the-counter.        Review of Systems   Constitutional:  Positive for fever. Negative for chills.   HENT:  Positive for congestion, postnasal drip, sinus pressure and sore throat. Negative for ear pain.    Eyes: Negative.    Respiratory:  Positive for cough. Negative for chest tightness, shortness of breath and wheezing.    Cardiovascular:  Negative for chest pain.   Gastrointestinal:  Negative for abdominal pain and nausea.   Musculoskeletal:  Negative for myalgias.   Neurological:  Negative for headaches.               REST OF PERTINENT ROS GONE OVER AND WAS NEGATIVE.                   Current Outpatient Medications:     azithromycin (ZITHROMAX) 250 MG tablet, Take 1 tablet by mouth See Admin Instructions for 5 days 500mg on day 1 followed by 250mg on days 2 - 5, Disp: 6 tablet, Rfl: 0    predniSONE (DELTASONE) 10 MG tablet, Take 3 tablets by

## 2024-08-12 RX ORDER — FLUCONAZOLE 100 MG/1
TABLET ORAL
Qty: 3 TABLET | Refills: 0 | Status: SHIPPED | OUTPATIENT
Start: 2024-08-12

## 2024-08-15 ENCOUNTER — PATIENT MESSAGE (OUTPATIENT)
Dept: PRIMARY CARE CLINIC | Age: 34
End: 2024-08-15

## 2024-09-30 NOTE — PROGRESS NOTES
Summary:     This note was copied forward from the last encounter.  Essential components for this patient record were reviewed and verified on this visit including:  recent hospitalizations, recent imaging, PMH, PSH, FH, SOC HX, Allergies, and Medications were reviewed and updated as appropriate.  In addition, the assessment and plan were copied from prior office note and updated accordingly.     Patient ID: Garret Rondon is a 34 y.o. female.     HPI   PCP: Vickey Oneill DO. Gynecologist: Dr. Garcia    Garret is a aleena 34 y.o. young woman who presents for follow up of chronic bilateral mastalgia; right worse than left.  Mostly related to her menses, but there is a focal area of tenderness of the right breast at the 11 o'clock position, 3 cm from the nipple.  She has a history of biopsy-proven fibroadenoma of the left breast.  Menarche age 13.  G2, P2.  First live birth age 25.    Risks for breast cancer include Ashkenazi Oriental orthodox ancestry (2% per 23 and me testing), paternal Great aunt in her 50's, Maternal great aunt with breast cancer, and a Maternal first cousin with breast cancer age 38.   We reviewed her family history on this visit with no pertinent additions.    Breast imaging to date @ [facility] IBS lifetime risk 16.4%.  Type III breast density.  03/14/2018 bilateral breast ultrasound: Oval solid mass left breast measuring 1.3 x 6 x 8 mm,  8:00 position and 3 cm from the nipple most consistent with fibroadenoma.  Contains biopsy clip.  Benign, BI-RADS 2.  09/24/2018 right breast limited ultrasound: Negative, BI-RADS 1  09/24/2018 left breast Limited ultrasound: Stable solid mass in the left breast is benign, BI-RADS 2.  01/20/2020 Limited left breast ultrasound: Negative, BI-RADS 1.  10/27/2020 Limited right breast ultrasound: Negative, BI-RADS 1.  04/30/2021 Limited left breast ultrasound negative, BI-RADS 1.  04/30/2021 Limited right breast ultrasound: Negative, BI-RADS

## 2024-11-11 ENCOUNTER — OFFICE VISIT (OUTPATIENT)
Dept: BREAST CENTER | Age: 34
End: 2024-11-11
Payer: COMMERCIAL

## 2024-11-11 VITALS
RESPIRATION RATE: 16 BRPM | TEMPERATURE: 98.3 F | SYSTOLIC BLOOD PRESSURE: 118 MMHG | HEIGHT: 63 IN | OXYGEN SATURATION: 99 % | HEART RATE: 81 BPM | WEIGHT: 171 LBS | DIASTOLIC BLOOD PRESSURE: 64 MMHG | BODY MASS INDEX: 30.3 KG/M2

## 2024-11-11 DIAGNOSIS — N64.4 BREAST PAIN: Primary | ICD-10-CM

## 2024-11-11 DIAGNOSIS — Z80.3 FAMILY HISTORY OF BREAST CANCER: ICD-10-CM

## 2024-11-11 PROCEDURE — 99213 OFFICE O/P EST LOW 20 MIN: CPT | Performed by: NURSE PRACTITIONER

## 2024-11-11 NOTE — PATIENT INSTRUCTIONS
The [facility] schedulers will call you this week to schedule the diagnostic mammogram and ultrasound.    [facility] Schedulers: [phone]

## 2024-11-12 ENCOUNTER — FOLLOWUP TELEPHONE ENCOUNTER (OUTPATIENT)
Dept: ENDOCRINOLOGY | Age: 34
End: 2024-11-12

## 2024-11-12 ENCOUNTER — OFFICE VISIT (OUTPATIENT)
Dept: ENDOCRINOLOGY | Age: 34
End: 2024-11-12
Payer: COMMERCIAL

## 2024-11-12 VITALS
HEIGHT: 63 IN | SYSTOLIC BLOOD PRESSURE: 105 MMHG | BODY MASS INDEX: 30.65 KG/M2 | OXYGEN SATURATION: 100 % | HEART RATE: 76 BPM | WEIGHT: 173 LBS | DIASTOLIC BLOOD PRESSURE: 72 MMHG

## 2024-11-12 DIAGNOSIS — N64.3 GALACTORRHEA: ICD-10-CM

## 2024-11-12 DIAGNOSIS — E66.811 CLASS 1 OBESITY DUE TO EXCESS CALORIES WITHOUT SERIOUS COMORBIDITY WITH BODY MASS INDEX (BMI) OF 30.0 TO 30.9 IN ADULT: ICD-10-CM

## 2024-11-12 DIAGNOSIS — E03.9 ACQUIRED HYPOTHYROIDISM: Primary | ICD-10-CM

## 2024-11-12 DIAGNOSIS — E28.2 PCOS (POLYCYSTIC OVARIAN SYNDROME): ICD-10-CM

## 2024-11-12 DIAGNOSIS — E66.09 CLASS 1 OBESITY DUE TO EXCESS CALORIES WITHOUT SERIOUS COMORBIDITY WITH BODY MASS INDEX (BMI) OF 30.0 TO 30.9 IN ADULT: ICD-10-CM

## 2024-11-12 DIAGNOSIS — E03.9 ACQUIRED HYPOTHYROIDISM: ICD-10-CM

## 2024-11-12 LAB
PROLACTIN: 7.76 NG/ML
T4 FREE: 1.2 NG/DL (ref 0.9–1.7)
TSH SERPL DL<=0.05 MIU/L-ACNC: 0.95 UIU/ML (ref 0.27–4.2)

## 2024-11-12 PROCEDURE — 99205 OFFICE O/P NEW HI 60 MIN: CPT | Performed by: CLINICAL NURSE SPECIALIST

## 2024-11-12 NOTE — TELEPHONE ENCOUNTER
Met with patient in endo office for PCOS/weight loss education. Discussed high fiber and low saturated fat. Reviewed mediterranean diet guidelines. Recommended 2178-1785 calories depending on activity level. Patient likely undereating calories at this time- reports initial weight loss on 1500 calorie diet but has since plateaued and reports some weight gain. Discussed strategies to promote healthy weight loss without excessively restricting calories. Encouraged regular physical activity. Patient agreeable- f/u as needed

## 2024-11-12 NOTE — PROGRESS NOTES
Wheezing 1 Inhaler 0     No current facility-administered medications for this visit.       Review of Systems  Constitutional: No fever, no chills, no diaphoresis, no generalized weakness.  HEENT: No blurred vision, No sore throat, no ear pain, no hair loss  Neck: denied any neck swelling, difficulty swallowing,   Cardio-pulmonary: No CP, SOB or palpitation, No orthopnea or PND. No cough or wheezing.  GI: No N/V/D, no constipation, No abdominal pain, no melena or hematochezia   : Denied any dysuria, hematuria, flank pain, discharge, or incontinence.   Skin: denied any rash, ulcer, Hirsute, or hyperpigmentation.   MSK: denied any joint deformity, joint pain/swelling, muscle pain, or back pain.  Neuro: no numbness, no tingling, no weakness, _    OBJECTIVE    /72   Pulse 76   Ht 1.6 m (5' 3\")   Wt 78.5 kg (173 lb)   SpO2 100%   BMI 30.65 kg/m²   BP Readings from Last 4 Encounters:   11/12/24 105/72   11/11/24 118/64   06/21/24 130/80   04/08/24 106/70     Wt Readings from Last 6 Encounters:   11/12/24 78.5 kg (173 lb)   11/11/24 77.6 kg (171 lb)   06/21/24 77.1 kg (170 lb)   04/08/24 73 kg (161 lb)   03/14/24 75.8 kg (167 lb)   02/14/24 76.7 kg (169 lb)       Physical examination:  General: awake alert, oriented x3, no abnormal position or movements.  HEENT: normocephalic non-traumatic, no exophthalmos   Neck: supple, no LN enlargement, no thyromegaly, no thyroid tenderness, no JVD.  Pulm: Clear equal air entry no added sounds, no wheezing or rhonchi    CVS: S1 + S2, no murmur, no heave. Dorsalis pedis pulse palpable   Abd: soft lax, no tenderness, no organomegaly, audible bowel sounds.   Skin: warm, no lesions, no rash. No callus, no Ulcers  Musculoskeletal: No back tenderness, no kyphosis/scoliosis    Neuro: CN intact,  muscle power normal  Psych: normal mood, and affect      Review of Laboratory Data:  I personally reviewed the following lab:  Lab Results   Component Value Date/Time    WBC 5.9

## 2024-11-13 LAB — THYROID PEROXIDASE (TPO) AB: 17 IU/ML (ref 0–25)

## 2024-11-15 ENCOUNTER — HOSPITAL ENCOUNTER (OUTPATIENT)
Dept: GENERAL RADIOLOGY | Age: 34
Discharge: HOME | End: 2024-11-17
Payer: COMMERCIAL

## 2024-11-15 ENCOUNTER — TELEPHONE (OUTPATIENT)
Dept: ENDOCRINOLOGY | Age: 34
End: 2024-11-15

## 2024-11-15 DIAGNOSIS — Z80.3 FAMILY HISTORY OF BREAST CANCER: ICD-10-CM

## 2024-11-15 DIAGNOSIS — N64.4 BREAST PAIN: ICD-10-CM

## 2024-11-15 PROCEDURE — G0279 TOMOSYNTHESIS, MAMMO: HCPCS

## 2024-11-15 PROCEDURE — 76642 ULTRASOUND BREAST LIMITED: CPT

## 2024-11-15 NOTE — TELEPHONE ENCOUNTER
----- Message from Eleazar MITCHELL sent at 11/12/2024  2:33 PM EST -----  Please call patient and inform her TSH is normal.  Prolactin is normal.  Other labs are still pending.  Will update patient once received

## 2024-12-23 RX ORDER — LEVOTHYROXINE SODIUM 75 UG/1
75 TABLET ORAL DAILY
Qty: 90 TABLET | Refills: 3 | Status: SHIPPED | OUTPATIENT
Start: 2024-12-23

## 2025-03-18 ENCOUNTER — OFFICE VISIT (OUTPATIENT)
Dept: PRIMARY CARE CLINIC | Age: 35
End: 2025-03-18
Payer: COMMERCIAL

## 2025-03-18 VITALS
BODY MASS INDEX: 30.65 KG/M2 | RESPIRATION RATE: 16 BRPM | HEIGHT: 63 IN | DIASTOLIC BLOOD PRESSURE: 70 MMHG | HEART RATE: 77 BPM | TEMPERATURE: 98.1 F | WEIGHT: 173 LBS | OXYGEN SATURATION: 98 % | SYSTOLIC BLOOD PRESSURE: 124 MMHG

## 2025-03-18 DIAGNOSIS — F41.9 ANXIETY: ICD-10-CM

## 2025-03-18 DIAGNOSIS — R10.11 RUQ PAIN: Primary | ICD-10-CM

## 2025-03-18 DIAGNOSIS — R53.83 FATIGUE, UNSPECIFIED TYPE: ICD-10-CM

## 2025-03-18 DIAGNOSIS — E03.9 ACQUIRED HYPOTHYROIDISM: ICD-10-CM

## 2025-03-18 DIAGNOSIS — R10.11 RUQ PAIN: ICD-10-CM

## 2025-03-18 LAB
HCT VFR BLD CALC: 40.1 % (ref 34–48)
HEMOGLOBIN: 13.1 G/DL (ref 11.5–15.5)
MCH RBC QN AUTO: 29.4 PG (ref 26–35)
MCHC RBC AUTO-ENTMCNC: 32.7 G/DL (ref 32–34.5)
MCV RBC AUTO: 89.9 FL (ref 80–99.9)
PDW BLD-RTO: 12.4 % (ref 11.5–15)
PLATELET # BLD: 225 K/UL (ref 130–450)
PMV BLD AUTO: 11.8 FL (ref 7–12)
RBC # BLD: 4.46 M/UL (ref 3.5–5.5)
WBC # BLD: 6.1 K/UL (ref 4.5–11.5)

## 2025-03-18 PROCEDURE — 99214 OFFICE O/P EST MOD 30 MIN: CPT | Performed by: FAMILY MEDICINE

## 2025-03-18 SDOH — ECONOMIC STABILITY: FOOD INSECURITY: WITHIN THE PAST 12 MONTHS, THE FOOD YOU BOUGHT JUST DIDN'T LAST AND YOU DIDN'T HAVE MONEY TO GET MORE.: NEVER TRUE

## 2025-03-18 SDOH — ECONOMIC STABILITY: FOOD INSECURITY: WITHIN THE PAST 12 MONTHS, YOU WORRIED THAT YOUR FOOD WOULD RUN OUT BEFORE YOU GOT MONEY TO BUY MORE.: NEVER TRUE

## 2025-03-18 ASSESSMENT — ENCOUNTER SYMPTOMS
WHEEZING: 0
SHORTNESS OF BREATH: 0
CHEST TIGHTNESS: 1
CONSTIPATION: 1
FLATUS: 1
CHEST TIGHTNESS: 0
RHINORRHEA: 0
DIARRHEA: 0
BLOOD IN STOOL: 0
COUGH: 0
NAUSEA: 1
APNEA: 0
EYE ITCHING: 0
BACK PAIN: 0
VISUAL CHANGE: 0
EYE PAIN: 0
SINUS PRESSURE: 0
HAIR LOSS: 0
VOMITING: 0
SINUS COMPLAINT: 1
SORE THROAT: 0
ABDOMINAL PAIN: 1
COLOR CHANGE: 0
EYE REDNESS: 0
HEMATOCHEZIA: 0

## 2025-03-18 NOTE — PROGRESS NOTES
Findings: No erythema or rash.   Neurological:      General: No focal deficit present.      Mental Status: She is alert and oriented to person, place, and time.      Cranial Nerves: No cranial nerve deficit.      Deep Tendon Reflexes: Reflexes are normal and symmetric. Reflexes normal.   Psychiatric:         Mood and Affect: Mood normal.                                 ASSESSMENT/PLAN:    Patient Active Problem List   Diagnosis    S/P tonsillectomy    Anxiety    Seasonal allergies    Mixed hyperlipidemia    Hypothyroidism due to Hashimoto's thyroiditis    Overweight (BMI 25.0-29.9)    Obesity       RUQ pain  -     CBC; Future  -     Comprehensive Metabolic Panel; Future  -     Lipase; Future  -     US GALLBLADDER RUQ; Future  Acquired hypothyroidism  Anxiety  Fatigue, unspecified type      Orders Placed This Encounter    US GALLBLADDER RUQ     Standing Status:   Future     Expected Date:   3/18/2025     Expiration Date:   3/18/2026    CBC     Standing Status:   Future     Expected Date:   3/18/2025     Expiration Date:   3/18/2026    Comprehensive Metabolic Panel     Standing Status:   Future     Expected Date:   3/18/2025     Expiration Date:   3/18/2026    Lipase     Standing Status:   Future     Expected Date:   3/18/2025     Expiration Date:   3/18/2026      Counseled regarding above diagnosis, including possible risks and complications,  especially if left uncontrolled.     Counseled regarding the possible side effects, risks, benefits and alternatives to treatment; patient and/or guardian verbalizes understanding, agrees, feels comfortable with and wishes to proceed with above treatment plan.     Advised patient to call with any new medication issues, and read all Rx info from pharmacy to assure aware of all possible risks and side effects of medication before taking.      Return if symptoms worsen or fail to improve.    I spent 30 minutes with this patient providing this service today, including time spent

## 2025-03-19 ENCOUNTER — RESULTS FOLLOW-UP (OUTPATIENT)
Dept: PRIMARY CARE CLINIC | Age: 35
End: 2025-03-19

## 2025-03-19 LAB
ALBUMIN: 4.9 G/DL (ref 3.5–5.2)
ALP BLD-CCNC: 68 U/L (ref 35–104)
ALT SERPL-CCNC: 15 U/L (ref 0–32)
ANION GAP SERPL CALCULATED.3IONS-SCNC: 14 MMOL/L (ref 7–16)
AST SERPL-CCNC: 22 U/L (ref 0–31)
BILIRUB SERPL-MCNC: 0.3 MG/DL (ref 0–1.2)
BUN BLDV-MCNC: 14 MG/DL (ref 6–20)
CALCIUM SERPL-MCNC: 9.3 MG/DL (ref 8.6–10.2)
CHLORIDE BLD-SCNC: 102 MMOL/L (ref 98–107)
CO2: 23 MMOL/L (ref 22–29)
CREAT SERPL-MCNC: 0.6 MG/DL (ref 0.5–1)
GFR, ESTIMATED: >90 ML/MIN/1.73M2
GLUCOSE BLD-MCNC: 83 MG/DL (ref 74–99)
LIPASE: 22 U/L (ref 13–60)
POTASSIUM SERPL-SCNC: 4.6 MMOL/L (ref 3.5–5)
SODIUM BLD-SCNC: 139 MMOL/L (ref 132–146)
TOTAL PROTEIN: 7.8 G/DL (ref 6.4–8.3)

## 2025-06-09 ENCOUNTER — TELEPHONE (OUTPATIENT)
Dept: PRIMARY CARE CLINIC | Age: 35
End: 2025-06-09

## 2025-06-09 ENCOUNTER — TELEPHONE (OUTPATIENT)
Age: 35
End: 2025-06-09

## 2025-06-09 NOTE — TELEPHONE ENCOUNTER
Patient sees you on July 11th for follow up on her weight loss medications.  It was the soonest she could see you due to her work.  FYI she is just letting you know she will need refills prior to that appt

## 2025-06-09 NOTE — TELEPHONE ENCOUNTER
RN Doris Sales received a call from patient wanting to reschedule her upcoming appointment with NP on 6/16/25 in [place] office. Patient works on Mondays, Tuesdays and Thursdays. RN rescheduled pt for 8/6/25 @ 11:30 am with NP in [facility].        Electronically signed by Doris Sales RN on 6/9/25 at 11:47 AM EDT

## 2025-07-01 ENCOUNTER — PATIENT MESSAGE (OUTPATIENT)
Dept: PRIMARY CARE CLINIC | Age: 35
End: 2025-07-01

## 2025-07-01 DIAGNOSIS — E66.811 CLASS 1 OBESITY DUE TO EXCESS CALORIES WITH SERIOUS COMORBIDITY AND BODY MASS INDEX (BMI) OF 30.0 TO 30.9 IN ADULT: ICD-10-CM

## 2025-07-01 DIAGNOSIS — E66.09 CLASS 1 OBESITY DUE TO EXCESS CALORIES WITH SERIOUS COMORBIDITY AND BODY MASS INDEX (BMI) OF 30.0 TO 30.9 IN ADULT: ICD-10-CM

## 2025-07-01 RX ORDER — PHENTERMINE HYDROCHLORIDE 15 MG/1
15 CAPSULE ORAL EVERY MORNING
Qty: 30 CAPSULE | Refills: 0 | Status: SHIPPED | OUTPATIENT
Start: 2025-07-01 | End: 2025-07-31

## 2025-07-11 ENCOUNTER — OFFICE VISIT (OUTPATIENT)
Dept: PRIMARY CARE CLINIC | Age: 35
End: 2025-07-11
Payer: COMMERCIAL

## 2025-07-11 VITALS
HEIGHT: 63 IN | TEMPERATURE: 97.5 F | HEART RATE: 85 BPM | SYSTOLIC BLOOD PRESSURE: 124 MMHG | DIASTOLIC BLOOD PRESSURE: 70 MMHG | OXYGEN SATURATION: 98 % | BODY MASS INDEX: 29.23 KG/M2 | RESPIRATION RATE: 16 BRPM | WEIGHT: 165 LBS

## 2025-07-11 DIAGNOSIS — R53.83 FATIGUE, UNSPECIFIED TYPE: ICD-10-CM

## 2025-07-11 DIAGNOSIS — E06.3 HYPOTHYROIDISM DUE TO HASHIMOTO'S THYROIDITIS: ICD-10-CM

## 2025-07-11 DIAGNOSIS — E66.3 OVERWEIGHT (BMI 25.0-29.9): Primary | ICD-10-CM

## 2025-07-11 PROCEDURE — 99213 OFFICE O/P EST LOW 20 MIN: CPT | Performed by: FAMILY MEDICINE

## 2025-07-11 ASSESSMENT — ENCOUNTER SYMPTOMS
SHORTNESS OF BREATH: 0
BLOOD IN STOOL: 0
CONSTIPATION: 0
BACK PAIN: 0
SORE THROAT: 0
RHINORRHEA: 0
WHEEZING: 0
ABDOMINAL PAIN: 0
EYE PAIN: 0
NAUSEA: 0
VOMITING: 0
EYE REDNESS: 0
VISUAL CHANGE: 0
COUGH: 0
APNEA: 0
SINUS PRESSURE: 0
DIARRHEA: 0
COLOR CHANGE: 0
CHEST TIGHTNESS: 0
CHANGE IN BOWEL HABIT: 0
EYE ITCHING: 0

## 2025-07-11 NOTE — PROGRESS NOTES
Chief Complaint:     Chief Complaint   Patient presents with    Follow-up     Discuss dose of phentermine     Weight Management         Weight Management  This is a recurrent problem. The current episode started more than 1 month ago. The problem occurs daily. The problem has been gradually improving. Pertinent negatives include no abdominal pain, arthralgias, change in bowel habit, chest pain, chills, congestion, coughing, diaphoresis, fatigue, fever, headaches, joint swelling, myalgias, nausea, neck pain, numbness, rash, sore throat, urinary symptoms, vertigo, visual change, vomiting or weakness. Nothing aggravates the symptoms. Treatments tried: phentermine. The treatment provided moderate relief.       Patient Active Problem List   Diagnosis    S/P tonsillectomy    Anxiety    Seasonal allergies    Mixed hyperlipidemia    Hypothyroidism due to Hashimoto's thyroiditis    Overweight (BMI 25.0-29.9)    Obesity       Past Medical History:   Diagnosis Date    Anxiety     Asthma     excersise induced and allergy induced    Enlarged lymph node     Bx 2018, neg    Environmental allergies     Hypothyroidism due to Hashimoto's thyroiditis     IBS (irritable bowel syndrome)     Mitral valve prolapse     no regurgitation, with anxiety or caffeine developes palpitations,followed with Dr. Wilkerson, has appt with Dr. Hayden 2018    Obesity        Past Surgical History:   Procedure Laterality Date    BREAST BIOPSY Left     age 21.  fibroadenoma     SECTION      2 children     COLONOSCOPY  2016    DENTAL SURGERY      extractions    AR BIOPSY OROPHARYNX Left 2018    LEFT TONGUE  BIOPSY performed by Isrrael Fish Jr., MD at [facility] OR    AR TONSILLECTOMY PRIMARY/SECONDARY AGE 14/> N/A 2018    TONSILLECTOMY performed by Isrrael Fish Jr., MD at [facility] OR    TONSILLECTOMY N/A 2018    TONSILLECTOMY POSTOP HEMORRHAGE CONTROL performed by Isrrael Fish Jr., MD at [facility] OR       Current Outpatient

## 2025-08-26 ENCOUNTER — TELEPHONE (OUTPATIENT)
Age: 35
End: 2025-08-26

## 2025-08-26 DIAGNOSIS — N63.11 MASS OF UPPER OUTER QUADRANT OF RIGHT BREAST: Primary | ICD-10-CM

## 2025-09-05 ENCOUNTER — OFFICE VISIT (OUTPATIENT)
Dept: PRIMARY CARE CLINIC | Age: 35
End: 2025-09-05

## 2025-09-05 VITALS
DIASTOLIC BLOOD PRESSURE: 70 MMHG | TEMPERATURE: 97.7 F | OXYGEN SATURATION: 98 % | HEART RATE: 83 BPM | RESPIRATION RATE: 16 BRPM | HEIGHT: 63 IN | WEIGHT: 158 LBS | BODY MASS INDEX: 28 KG/M2 | SYSTOLIC BLOOD PRESSURE: 126 MMHG

## 2025-09-05 DIAGNOSIS — Z00.01 ENCOUNTER FOR WELL ADULT EXAM WITH ABNORMAL FINDINGS: Primary | ICD-10-CM

## 2025-09-05 DIAGNOSIS — E55.9 VITAMIN D DEFICIENCY: ICD-10-CM

## 2025-09-05 DIAGNOSIS — E06.3 HYPOTHYROIDISM DUE TO HASHIMOTO'S THYROIDITIS: ICD-10-CM

## 2025-09-05 DIAGNOSIS — E78.2 MIXED HYPERLIPIDEMIA: ICD-10-CM

## 2025-09-05 PROCEDURE — 99395 PREV VISIT EST AGE 18-39: CPT | Performed by: FAMILY MEDICINE

## 2025-09-05 ASSESSMENT — ENCOUNTER SYMPTOMS
WHEEZING: 0
SINUS PRESSURE: 0
BLOOD IN STOOL: 0
EYE PAIN: 0
CHEST TIGHTNESS: 0
SHORTNESS OF BREATH: 0
CONSTIPATION: 0
EYE ITCHING: 0
COUGH: 0
COLOR CHANGE: 0
VOMITING: 0
EYE REDNESS: 0
NAUSEA: 0
SORE THROAT: 0
APNEA: 0
DIARRHEA: 0
ABDOMINAL PAIN: 0
BACK PAIN: 0

## (undated) DEVICE — DOUBLE BASIN SET: Brand: MEDLINE INDUSTRIES, INC.

## (undated) DEVICE — SOLUTION IV IRRIG WATER 1000ML POUR BRL 2F7114

## (undated) DEVICE — HOOK ENDOSCP L4-9CM TIP FOR SEAL AND DISECT HARM

## (undated) DEVICE — SYRINGE, LUER LOCK, 10ML: Brand: MEDLINE

## (undated) DEVICE — TOWEL,OR,DSP,ST,BLUE,STD,6/PK,12PK/CS: Brand: MEDLINE

## (undated) DEVICE — 4-PORT MANIFOLD: Brand: NEPTUNE 2

## (undated) DEVICE — ELECTROSURGICAL PENCIL BUTTON SWITCH E-Z CLEAN COATED BLADE ELECTRODE 10 FT (3 M) CORD HOLSTER: Brand: MEGADYNE

## (undated) DEVICE — SYRINGE,EAR/ULCER, 2 OZ, STERILE: Brand: MEDLINE

## (undated) DEVICE — SUCTION COAGULATOR, FOOTSWITCHING, 10 FR, 6 INCH (15.24CM): Brand: MEGADYNE

## (undated) DEVICE — TUBING, SUCTION, 3/16" X 12', STRAIGHT: Brand: MEDLINE

## (undated) DEVICE — CLEANER,CAUTERY TIP,2X2",STERILE: Brand: MEDLINE

## (undated) DEVICE — SOLUTION IV IRRIG POUR BRL 0.9% SODIUM CHL 2F7124

## (undated) DEVICE — MARKER,SKIN,WI/RULER AND LABELS: Brand: MEDLINE

## (undated) DEVICE — BASIC SINGLE BASIN 1-LF: Brand: MEDLINE INDUSTRIES, INC.

## (undated) DEVICE — PATIENT RETURN ELECTRODE, SINGLE-USE, CONTACT QUALITY MONITORING, ADULT, WITH 9FT CORD, FOR PATIENTS WEIGING OVER 33LBS. (15KG): Brand: MEGADYNE

## (undated) DEVICE — DUAL LUMEN STOMACH TUBE: Brand: SALEM SUMP

## (undated) DEVICE — GOWN,SIRUS,FABRNF,XL,20/CS: Brand: MEDLINE

## (undated) DEVICE — SPONGE LAP W3/8XL1.5IN COT CYL CNTR STRUNG N RADPQ STRL

## (undated) DEVICE — KIT,ANTI FOG,W/SPONGE & FLUID,SOFT PACK: Brand: MEDLINE

## (undated) DEVICE — PAD,NON-ADHERENT,2X3,STERILE,LF,1/PK: Brand: MEDLINE

## (undated) DEVICE — SPONGE,TONSIL,DBL STRNG,XRAY,MED,1",STRL: Brand: MEDLINE INDUSTRIES, INC.

## (undated) DEVICE — INTENDED FOR TISSUE SEPARATION, AND OTHER PROCEDURES THAT REQUIRE A SHARP SURGICAL BLADE TO PUNCTURE OR CUT.: Brand: BARD-PARKER ® STAINLESS STEEL BLADES

## (undated) DEVICE — PUNCH TONSIL / ADENOID

## (undated) DEVICE — CATHETER ETER SUCT 14FR RED POLYPR STR OPN W VLV

## (undated) DEVICE — SURGICAL PROCEDURE PACK EENT CUST

## (undated) DEVICE — E-Z CLEAN, NON-STICK, PTFE COATED, ELECTROSURGICAL BLADE ELECTRODE, MODIFIED EXTENDED INSULATION, 2.5 INCH (6.35 CM): Brand: MEGADYNE

## (undated) DEVICE — COUNTER NDL 30 COUNT DBL MAG